# Patient Record
Sex: MALE | Race: WHITE | NOT HISPANIC OR LATINO | Employment: OTHER | URBAN - METROPOLITAN AREA
[De-identification: names, ages, dates, MRNs, and addresses within clinical notes are randomized per-mention and may not be internally consistent; named-entity substitution may affect disease eponyms.]

---

## 2017-05-01 ENCOUNTER — HISTORICAL (OUTPATIENT)
Dept: ADMINISTRATIVE | Facility: HOSPITAL | Age: 57
End: 2017-05-01

## 2017-05-11 ENCOUNTER — TELEPHONE (OUTPATIENT)
Dept: FAMILY MEDICINE | Facility: CLINIC | Age: 57
End: 2017-05-11

## 2017-05-11 RX ORDER — PREDNISONE 20 MG/1
20 TABLET ORAL DAILY
Qty: 5 TABLET | Refills: 0 | Status: SHIPPED | OUTPATIENT
Start: 2017-05-11 | End: 2017-05-16

## 2017-05-11 RX ORDER — AMOXICILLIN AND CLAVULANATE POTASSIUM 875; 125 MG/1; MG/1
1 TABLET, FILM COATED ORAL EVERY 12 HOURS
Qty: 14 TABLET | Refills: 0 | Status: SHIPPED | OUTPATIENT
Start: 2017-05-11 | End: 2017-08-30

## 2017-05-11 NOTE — TELEPHONE ENCOUNTER
----- Message from Bassam Lopez MD sent at 5/11/2017  2:58 PM CDT -----  Pt Rx sent for Augmentin and prednisone  ----- Message -----     From: Natalie Hay MA     Sent: 5/11/2017   8:43 AM       To: Bassam Lopez MD    Pt has sinus infection   Going out of town tonight  Wants antibotic and steriods called in

## 2017-05-11 NOTE — TELEPHONE ENCOUNTER
----- Message from Natalie Hay MA sent at 5/11/2017  8:43 AM CDT -----  Pt has sinus infection   Going out of town tonight  Wants antibotic and steriods called in

## 2017-08-30 ENCOUNTER — TELEPHONE (OUTPATIENT)
Dept: FAMILY MEDICINE | Facility: CLINIC | Age: 57
End: 2017-08-30

## 2017-08-30 DIAGNOSIS — J20.9 ACUTE BRONCHITIS, UNSPECIFIED ORGANISM: Primary | ICD-10-CM

## 2017-08-30 RX ORDER — TRIAMTERENE/HYDROCHLOROTHIAZID 37.5-25 MG
1 TABLET ORAL DAILY
COMMUNITY
Start: 2017-01-09 | End: 2017-08-30 | Stop reason: SDUPTHER

## 2017-08-30 RX ORDER — METOPROLOL TARTRATE 100 MG/1
1 TABLET ORAL 2 TIMES DAILY
COMMUNITY
Start: 2017-02-10 | End: 2018-02-21 | Stop reason: SDUPTHER

## 2017-08-30 RX ORDER — MULTIVITAMIN
1 TABLET ORAL DAILY
COMMUNITY
End: 2018-10-30

## 2017-08-30 RX ORDER — CEFUROXIME AXETIL 500 MG/1
500 TABLET ORAL EVERY 12 HOURS
Qty: 14 TABLET | Refills: 0 | Status: SHIPPED | OUTPATIENT
Start: 2017-08-30 | End: 2017-09-06

## 2017-08-30 RX ORDER — LANOLIN ALCOHOL/MO/W.PET/CERES
1 CREAM (GRAM) TOPICAL DAILY
COMMUNITY
Start: 2016-10-27 | End: 2019-12-21 | Stop reason: CLARIF

## 2017-08-30 RX ORDER — PREDNISONE 20 MG/1
20 TABLET ORAL DAILY
Qty: 5 TABLET | Refills: 0 | Status: SHIPPED | OUTPATIENT
Start: 2017-08-30 | End: 2017-09-04

## 2017-08-30 RX ORDER — AMLODIPINE BESYLATE AND ATORVASTATIN CALCIUM 5; 20 MG/1; MG/1
1 TABLET, FILM COATED ORAL DAILY
COMMUNITY
Start: 2017-02-10 | End: 2018-02-21 | Stop reason: SDUPTHER

## 2017-08-30 RX ORDER — OMEPRAZOLE 20 MG/1
1 TABLET, DELAYED RELEASE ORAL DAILY
COMMUNITY

## 2017-08-30 RX ORDER — POTASSIUM CHLORIDE 750 MG/1
1 TABLET, EXTENDED RELEASE ORAL 2 TIMES DAILY
COMMUNITY
Start: 2016-10-27 | End: 2017-10-02 | Stop reason: SDUPTHER
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
## 2017-09-29 ENCOUNTER — PATIENT MESSAGE (OUTPATIENT)
Dept: FAMILY MEDICINE | Facility: CLINIC | Age: 57
End: 2017-09-29

## 2017-10-02 RX ORDER — POTASSIUM CHLORIDE 750 MG/1
10 TABLET, EXTENDED RELEASE ORAL 2 TIMES DAILY
Qty: 180 TABLET | Refills: 2 | Status: SHIPPED | OUTPATIENT
Start: 2017-10-02 | End: 2019-07-15 | Stop reason: SDUPTHER

## 2017-11-03 LAB
ALBUMIN SERPL-MCNC: 4.3 G/DL (ref 3.1–4.7)
ALP SERPL-CCNC: 55 IU/L (ref 40–104)
ALT (SGPT): 32 IU/L (ref 3–33)
AST SERPL-CCNC: 24 IU/L (ref 10–40)
BILIRUB SERPL-MCNC: 0.8 MG/DL (ref 0.3–1)
BUN SERPL-MCNC: 15 MG/DL (ref 8–20)
CALCIUM SERPL-MCNC: 9.2 MG/DL (ref 7.7–10.4)
CHLORIDE: 105 MMOL/L (ref 98–110)
CO2 SERPL-SCNC: 31.5 MMOL/L (ref 22.8–31.6)
COMPLEXED PSA SERPL-MCNC: 1.4 NG/ML (ref 0–3)
CREATININE RANDOM URINE: 268 MG/DL
CREATININE: 0.93 MG/DL (ref 0.6–1.4)
GLUCOSE: 119 MG/DL (ref 70–99)
MICROALBUM.,U,RANDOM: 10.6 MCG/ML (ref 0–19.9)
MICROALBUMIN/CREATININE RATIO: 4 (ref 0–30)
POTASSIUM SERPL-SCNC: 3.7 MMOL/L (ref 3.5–5)
PROT SERPL-MCNC: 6.5 G/DL (ref 6–8.2)
SODIUM: 143 MMOL/L (ref 134–144)

## 2017-11-08 ENCOUNTER — OFFICE VISIT (OUTPATIENT)
Dept: FAMILY MEDICINE | Facility: CLINIC | Age: 57
End: 2017-11-08
Payer: COMMERCIAL

## 2017-11-08 VITALS
DIASTOLIC BLOOD PRESSURE: 84 MMHG | HEIGHT: 72 IN | BODY MASS INDEX: 32.64 KG/M2 | WEIGHT: 241 LBS | HEART RATE: 72 BPM | SYSTOLIC BLOOD PRESSURE: 127 MMHG

## 2017-11-08 DIAGNOSIS — K21.9 GERD WITHOUT ESOPHAGITIS: ICD-10-CM

## 2017-11-08 DIAGNOSIS — Z11.59 NEED FOR HEPATITIS C SCREENING TEST: ICD-10-CM

## 2017-11-08 DIAGNOSIS — I10 BENIGN ESSENTIAL HYPERTENSION: Primary | ICD-10-CM

## 2017-11-08 DIAGNOSIS — E78.2 MIXED DYSLIPIDEMIA: ICD-10-CM

## 2017-11-08 PROBLEM — E78.49 FAMILIAL COMBINED HYPERLIPIDEMIA: Status: ACTIVE | Noted: 2017-11-08

## 2017-11-08 PROBLEM — E87.6 HYPOKALEMIA: Status: ACTIVE | Noted: 2017-11-08

## 2017-11-08 PROBLEM — E66.9 OBESITY WITH BODY MASS INDEX 30 OR GREATER: Status: ACTIVE | Noted: 2017-11-08

## 2017-11-08 PROBLEM — Z86.79 HISTORY OF HYPERTENSION: Status: ACTIVE | Noted: 2017-11-08

## 2017-11-08 PROBLEM — J30.1 HAY FEVER: Status: ACTIVE | Noted: 2017-11-08

## 2017-11-08 PROBLEM — N20.0 CALCULUS OF KIDNEY: Status: ACTIVE | Noted: 2017-11-08

## 2017-11-08 PROBLEM — F17.200 CURRENT SMOKER: Status: ACTIVE | Noted: 2017-11-08

## 2017-11-08 PROCEDURE — 99213 OFFICE O/P EST LOW 20 MIN: CPT | Mod: ,,, | Performed by: INTERNAL MEDICINE

## 2017-11-08 NOTE — PATIENT INSTRUCTIONS
Taking Your Blood Pressure  Blood pressure is the force of blood against the artery wall as it moves from the heart through the blood vessels. You can take your own blood pressure reading using a digital monitor. Take your readings the same each time, using the same arm. Take readings as often as your healthcare provider instructs.  About blood pressure monitors  Blood pressure monitors are designed for certain ages and cases. You can find monitors for older adults, for pregnant women, and for children. Make sure the one you choose is the right one for your age and situation.  The American Heart Association recommends an automatic cuff monitor that fits on your upper arm (bicep). The cuff should fit your arm size. A cuff thats too large or too small will not give an accurate reading. Measure around your upper arm to find your size.  Monitors that attach to your finger or wrist are not as accurate as monitors for your upper arm.  Ask your healthcare provider for help in choosing a monitor. Bring your monitor to your next provider visit if you need help in using it the correct way.  The steps below are general instructions for using an automatic digital monitor.  Step 1. Relax    · Take your blood pressure at the same time every day, such as in the morning or evening, or at the time your healthcare provider recommends.  · Wait at least a half-hour after smoking, eating, or exercising. Don't drink coffee, tea, soda, or other caffeinated beverages before checking your blood pressure.  · Sit comfortably at a table with both feet on the floor. Do not cross your legs or feet. Place the monitor near you.  · Rest for a few minutes before you begin.  Step 2. Wrap the cuff    · Place your arm on the table, palm up. Your arm should be at the level of your heart. Wrap the cuff around your upper arm, just above your elbow. Its best done on bare skin, not over clothing. Most cuffs will indicate where the brachial artery (the  blood vessel in the middle of the arm at the inner side of the elbow) should line up with the cuff. Look in your monitor's instruction booklet for an illustration. You can also bring your cuff to your healthcare provider and have them show you how to correctly place the cuff.  Step 3. Inflate the cuff    · Push the button that starts the pump.  · The cuff will tighten, then loosen.  · The numbers will change. When they stop changing, your blood pressure reading will appear.  · Take 2 or 3 readings one minute apart.  Step 4. Write down the results of each reading    · Write down your blood pressure numbers for each reading. Note the date and time. Keep your results in one place, such as a notebook. Even if your monitor has a built-in memory, keep a hard copy of the readings.  · Remove the cuff from your arm. Turn off the machine.  · Bring your blood pressure records with your healthcare providers at each visit.  · If you start a new blood pressure medicine, note the day you started the new medicine. Also note the day if you change the dose of your medicine. This information goes on your blood pressure recording sheet. This will help your healthcare provider monitor how well the medicine changes are working.  · Ask your healthcare provider what numbers should prompt you to call him or her. Also ask what numbers should prompt you to get help right away.  Date Last Reviewed: 11/1/2016  © 4334-2360 [employer]. [address] [place] [address], [place] [address]. All rights reserved. This information is not intended as a substitute for professional medical care. Always follow your healthcare professional's instructions.

## 2017-11-08 NOTE — PROGRESS NOTES
Subjective:       Patient ID: Howard Segura is a 57 y.o. male.    Chief Complaint: Hypertension (lab review); Hyperlipidemia; and Gastroesophageal Reflux    Hypertension   This is a chronic problem. The current episode started more than 1 year ago. The problem is controlled. Associated symptoms include peripheral edema. Pertinent negatives include no anxiety, chest pain, malaise/fatigue, palpitations or shortness of breath. Risk factors for coronary artery disease include sedentary lifestyle, male gender, obesity and dyslipidemia. Past treatments include calcium channel blockers, beta blockers and diuretics (Also potassium supplements for low potassium). Compliance problems include exercise.  There is no history of CVA, heart failure or left ventricular hypertrophy.   Hyperlipidemia   This is a chronic (Recent triglycerides are elevated. LDL is good.) problem. The current episode started more than 1 year ago. The problem is controlled. Recent lipid tests were reviewed and are variable. Exacerbating diseases include obesity. He has no history of hypothyroidism. Factors aggravating his hyperlipidemia include thiazides. Pertinent negatives include no chest pain, myalgias or shortness of breath. Current antihyperlipidemic treatment includes statins. Compliance problems include adherence to exercise.  Risk factors for coronary artery disease include hypertension, male sex, dyslipidemia and obesity.   Gastroesophageal Reflux   He complains of heartburn. He reports no abdominal pain, no chest pain or no coughing. This is a chronic problem. The current episode started more than 1 year ago. The problem occurs frequently. The problem has been waxing and waning. Pertinent negatives include no fatigue.       Past Medical History:   Diagnosis Date    GERD (gastroesophageal reflux disease)     Hyperlipidemia     Hypertension     Kidney stone      Social History     Social History    Marital status:      Spouse name:  N/A    Number of children: N/A    Years of education: N/A     Occupational History    Not on file.     Social History Main Topics    Smoking status: Never Smoker    Smokeless tobacco: Never Used    Alcohol use No    Drug use: No    Sexual activity: Yes     Partners: Female     Other Topics Concern    Not on file     Social History Narrative    No narrative on file     Past Surgical History:   Procedure Laterality Date    KNEE SURGERY      ROTATOR CUFF REPAIR      SINUS SURGERY       Family History   Problem Relation Age of Onset    Hepatitis Mother     Heart disease Father        Review of Systems   Constitutional: Negative for activity change, appetite change, fatigue, malaise/fatigue and unexpected weight change.   HENT: Negative for congestion, sneezing and trouble swallowing.    Eyes: Negative for pain and visual disturbance.   Respiratory: Negative for cough, chest tightness and shortness of breath.    Cardiovascular: Negative for chest pain, palpitations and leg swelling.        Hypertension and dyslipidemia   Gastrointestinal: Positive for heartburn. Negative for abdominal distention, abdominal pain, blood in stool, constipation and diarrhea.   Endocrine: Negative for cold intolerance, heat intolerance, polydipsia, polyphagia and polyuria.        Dyslipidemia with triglyceride elevation.   Genitourinary: Negative for dysuria and hematuria.   Musculoskeletal: Negative for arthralgias, back pain, gait problem and myalgias.   Skin: Negative for pallor, rash and wound.   Allergic/Immunologic: Negative for environmental allergies, food allergies and immunocompromised state.   Neurological: Negative for dizziness, seizures, speech difficulty, light-headedness and numbness.   Hematological: Negative for adenopathy. Does not bruise/bleed easily.   Psychiatric/Behavioral: Negative for agitation, behavioral problems and confusion. The patient is not nervous/anxious.        Objective:       Vitals:     11/08/17 1121   BP: 127/84   Pulse: 72   Weight: 109.3 kg (241 lb)   Height: 6' (1.829 m)     Physical Exam   Constitutional: He is oriented to person, place, and time. He appears well-developed.   HENT:   Head: Normocephalic and atraumatic.   Nose: Nose normal.   Mouth/Throat: Oropharynx is clear and moist. No oropharyngeal exudate.   Eyes: Conjunctivae and EOM are normal.   Neck: Normal range of motion. Neck supple. No JVD present. No tracheal deviation present. No thyromegaly present.   Cardiovascular: Normal rate, regular rhythm and normal heart sounds.  Exam reveals no gallop and no friction rub.    No murmur heard.  Pulmonary/Chest: Effort normal and breath sounds normal. No respiratory distress. He has no wheezes. He has no rales.   Abdominal: Soft. Bowel sounds are normal. He exhibits no distension. There is no tenderness.   Musculoskeletal: Normal range of motion. He exhibits edema (trace).   Neurological: He is alert and oriented to person, place, and time. He has normal reflexes.   Skin: Skin is warm and dry.   Psychiatric: He has a normal mood and affect.   Nursing note and vitals reviewed.    panel   Order: [id number] - Reflex for Order [id number]   Status:  Final result   Visible to patient:  Yes (Patient Portal) Next appt:  None   Narrative                                  VALUE             REFERENCE RANGE  UNITS                                         ----------------------------------------------------------------------------------  Cholesterol                   140                         mg/dL       Triglycerides                 287 H                       mg/dL       HDL Cholesterol                28                        23-75  mg/dL       LDL Cholesterol                55                        0-100  mg/dL       VLDL Cholesterol               57 H                      12-27  mg/dL       Cholesterol Ratio            5.00                                            CHOLESTEROL RATIO  INTERPRETATION                           MEN-- WOMEN                                                                                                                        AVERAGE RISK--5.00--4.40      2X AVERAGE RISK-  9.51--7.00      3X AVERAGE RISK-  23.00-    11.00         Specimen Collected: 11/03/17 06:37 Last Resulted: 11/03/17 08:53 Order Details View Encounter Lab and Collection Details Routing               Comprehensive metabolic panel   Order: [id number]   Status:  Final result   Visible to patient:  Yes (Patient Portal) Next appt:  None    Ref Range & Units 5d ago   Glucose 70 - 99 mg/dL 119     BUN, Bld 8 - 20 mg/dL 15    Creatinine 0.60 - 1.40 mg/dL 0.93    Calcium 7.7 - 10.4 mg/dL 9.2    Sodium 134 - 144 mmol/L 143    Potassium 3.5 - 5.0 mmol/L 3.7    Chloride 98 - 110 mmol/L 105    CO2 22.8 - 31.6 mmol/L 31.5    Albumin 3.1 - 4.7 g/dL 4.3    Total Bilirubin 0.3 - 1.0 mg/dL 0.8    Alkaline Phosphatase 40 - 104 IU/L 55    Total Protein 6.0 - 8.2 g/dL 6.5    ALT (SGPT) 3 - 33 IU/L 32    AST 10 - 40 IU/L 24    [facility]  [facility]             Assessment:       1. Benign essential hypertension    2. Mixed dyslipidemia    3. Need for hepatitis C screening test    4. GERD without esophagitis         Plan:           Benign essential hypertension  -     Basic metabolic panel; Future; Expected date: 11/08/2017    Mixed dyslipidemia    Need for hepatitis C screening test  -     Hepatitis C antibody; Future; Expected date: 11/08/2017    GERD without esophagitis    Patient has been advised to watch diet and exercise. Avoid fried and fatty food. Compliance to medications and follow up urged.      Discussion about reflux and prevention including dietary measures have been discussed.    Patient is doing okay on medications.    Follow-up in 6 months for annual physical.    Current Outpatient Prescriptions on File Prior to Visit   Medication Sig Dispense Refill    amlodipine-atorvastatin (CADUET) 5-20 mg per  tablet Take 1 tablet by mouth once daily.      magnesium oxide (MAG-OX) 400 mg tablet Take 1 tablet by mouth once daily.      metoprolol tartrate (LOPRESSOR) 100 MG tablet Take 1 tablet by mouth 2 (two) times daily.      multivitamin (THERAGRAN) per tablet Take 1 tablet by mouth once daily.      omeprazole (PRILOSEC OTC) 20 MG tablet Take 1 tablet by mouth once daily.      potassium chloride SA (KLOR-CON M10) 10 MEQ tablet Take 1 tablet (10 mEq total) by mouth 2 (two) times daily. 180 tablet 2    triamterene-hydrochlorothiazide 37.5-25 mg (MAXZIDE-25) 37.5-25 mg per tablet        No current facility-administered medications on file prior to visit.

## 2018-01-19 ENCOUNTER — PATIENT MESSAGE (OUTPATIENT)
Dept: FAMILY MEDICINE | Facility: CLINIC | Age: 58
End: 2018-01-19

## 2018-01-19 DIAGNOSIS — J01.00 ACUTE NON-RECURRENT MAXILLARY SINUSITIS: Primary | ICD-10-CM

## 2018-01-19 RX ORDER — PREDNISONE 20 MG/1
20 TABLET ORAL DAILY
Qty: 5 TABLET | Refills: 0 | Status: SHIPPED | OUTPATIENT
Start: 2018-01-19 | End: 2018-01-24

## 2018-02-19 RX ORDER — TRIAMTERENE/HYDROCHLOROTHIAZID 37.5-25 MG
1 TABLET ORAL DAILY
Qty: 90 TABLET | Refills: 1 | Status: SHIPPED | OUTPATIENT
Start: 2018-02-19 | End: 2018-08-08 | Stop reason: SDUPTHER

## 2018-02-21 RX ORDER — AMLODIPINE BESYLATE AND ATORVASTATIN CALCIUM 5; 20 MG/1; MG/1
1 TABLET, FILM COATED ORAL DAILY
Qty: 90 TABLET | Refills: 3 | Status: SHIPPED | OUTPATIENT
Start: 2018-02-21 | End: 2019-03-07 | Stop reason: SDUPTHER

## 2018-02-21 RX ORDER — METOPROLOL TARTRATE 100 MG/1
100 TABLET ORAL 2 TIMES DAILY
Qty: 180 TABLET | Refills: 3 | Status: SHIPPED | OUTPATIENT
Start: 2018-02-21 | End: 2019-03-07 | Stop reason: SDUPTHER

## 2018-05-02 LAB
BUN SERPL-MCNC: 14 MG/DL (ref 8–20)
CALCIUM SERPL-MCNC: 9.1 MG/DL (ref 7.7–10.4)
CHLORIDE: 102 MMOL/L (ref 98–110)
CO2 SERPL-SCNC: 30.6 MMOL/L (ref 22.8–31.6)
CREATININE: 0.94 MG/DL (ref 0.6–1.4)
GLUCOSE: 123 MG/DL (ref 70–99)
POTASSIUM SERPL-SCNC: 3.3 MMOL/L (ref 3.5–5)
SODIUM: 142 MMOL/L (ref 134–144)

## 2018-05-03 LAB — HCV AB SERPL QL IA: <0.1 S/CO RATIO (ref 0–0.9)

## 2018-05-08 ENCOUNTER — OFFICE VISIT (OUTPATIENT)
Dept: FAMILY MEDICINE | Facility: CLINIC | Age: 58
End: 2018-05-08
Payer: COMMERCIAL

## 2018-05-08 VITALS
HEART RATE: 86 BPM | WEIGHT: 237 LBS | SYSTOLIC BLOOD PRESSURE: 136 MMHG | BODY MASS INDEX: 32.1 KG/M2 | HEIGHT: 72 IN | DIASTOLIC BLOOD PRESSURE: 86 MMHG

## 2018-05-08 DIAGNOSIS — M54.2 NECK PAIN, BILATERAL: Primary | ICD-10-CM

## 2018-05-08 DIAGNOSIS — N52.9 ERECTILE DYSFUNCTION, UNSPECIFIED ERECTILE DYSFUNCTION TYPE: ICD-10-CM

## 2018-05-08 DIAGNOSIS — E78.2 MIXED DYSLIPIDEMIA: ICD-10-CM

## 2018-05-08 DIAGNOSIS — F17.200 TOBACCO DEPENDENCE: ICD-10-CM

## 2018-05-08 DIAGNOSIS — D17.0 LIPOMA OF NECK: ICD-10-CM

## 2018-05-08 DIAGNOSIS — I10 BENIGN ESSENTIAL HYPERTENSION: ICD-10-CM

## 2018-05-08 PROCEDURE — 99214 OFFICE O/P EST MOD 30 MIN: CPT | Mod: ,,, | Performed by: INTERNAL MEDICINE

## 2018-05-08 PROCEDURE — 3008F BODY MASS INDEX DOCD: CPT | Mod: ,,, | Performed by: INTERNAL MEDICINE

## 2018-05-08 PROCEDURE — 3075F SYST BP GE 130 - 139MM HG: CPT | Mod: ,,, | Performed by: INTERNAL MEDICINE

## 2018-05-08 PROCEDURE — 3079F DIAST BP 80-89 MM HG: CPT | Mod: ,,, | Performed by: INTERNAL MEDICINE

## 2018-05-08 RX ORDER — IBUPROFEN 200 MG
1 TABLET ORAL DAILY
Qty: 30 PATCH | Refills: 2 | Status: SHIPPED | OUTPATIENT
Start: 2018-05-08 | End: 2018-10-30

## 2018-05-08 NOTE — PATIENT INSTRUCTIONS
General Neck and Back Pain    Both neck and back pain are usually caused by injury to the muscles or ligaments of the spine. Sometimes the disks that separate each bone of the spine may cause pain by pressing on a nearby nerve. Back and neck pain may appear after a sudden twisting or bending force (such as in a car accident), or sometimes after a simple awkward movement. In either case, muscle spasm is often present and adds to the pain.  Acute neck and back pain usually gets better in 1 to 2 weeks. Pain related to disk disease, arthritis in the spinal joints or spinal stenosis (narrowing of the spinal canal) can become chronic and last for months or years.  Back and neck pain are common problems. Most people feel better in 1 or 2 weeks, and most of the rest in 1 to 2 months. Most people can remain active.  People experience and describe pain differently.  · Pain can be sharp, stabbing, shooting, aching, cramping, or burning  · Movement, standing, bending, lifting, sitting, or walking may worsen the pain  · Pain can be localized to one spot or area, or it can be more generalized  · Pain can spread or radiate upwards, downwards, to the front, or go down your arms  · Muscle spasm may occur.  Most of the time mechanical problems with the muscles or spine cause the pain. it is usually caused by an injury, whether known or not, to the muscles or ligaments. While illnesses can cause back pain, it is usually not caused by a serious illness. Pain is usually related to physical activity, whether sports, exercise, work, or normal activity. Sometimes it can occur without an identifiable cause. This can happen simply by stretching or moving wrong, without noting pain at the time. Other causes include:  · Overexertion, lifting, pushing, pulling incorrectly or too aggressively.  · Sudden twisting, bending or stretching from an accident (car or fall), or accidental movement.  · Poor posture  · Poor conditioning, lack of regular  exercise  · Spinal disc disease or arthritis  · Stress  · Pregnancy, or illness like appendicitis, bladder or kidney infection, pelvic infections   Home care  · For neck pain: Use a comfortable pillow that supports the head and keeps the spine in a neutral position. The position of the head should not be tilted forward or backward.  · When in bed, try to find a position of comfort. A firm mattress is best. Try lying flat on your back with pillows under your knees. You can also try lying on your side with your knees bent up towards your chest and a pillow between your knees.  · At first, do not try to stretch out the sore spots. If there is a strain, it is not like the good soreness you get after exercising without an injury. In this case, stretching may make it worse.  · Avoid prolonged sitting, long car rides or travel. This puts more stress on the lower back than standing or walking.  · During the first 24 to 72 hours after an injury, apply an ice pack to the painful area for 20 minutes and then remove it for 20 minutes over a period of 60 to 90 minutes or several times a day.   · You can alternate ice and heat therapies. Talk with your healthcare provider about the best treatment for your back or neck pain. As a safety precaution, do not use a heating pad at bedtime. Sleeping with a heating pad can lead to skin burns or tissue damage.  · Therapeutic massage can help relax the back and neck muscles without stretching them.  · Be aware of safe lifting methods and do not lift anything over 15 pounds until all the pain is gone.  Medications  Talk to your healthcare provider before using medicine, especially if you have other medical problems or are taking other medicines.  · You may use over-the-counter medicine to control pain, unless another pain medicine was prescribed. If you have chronic conditions like diabetes, liver or kidney disease, stomach ulcers,  gastrointestinal bleeding, or are taking blood thinner  medicines.  · Be careful if you are given pain medicines, narcotics, or medicine for muscle spasm. They can cause drowsiness, and can affect your coordination, reflexes, and judgment. Do not drive or operate heavy machinery.  Follow-up care  Follow up with your healthcare provider, or as advised. Physical therapy or further tests may be needed.  If X-rays were taken, you will be notified of any new findings that may affect your care.  Call 911  Seek emergency medical care if any of the following occur:  · Trouble breathing  · Confusion  · Very drowsy or trouble awakening  · Fainting or loss of consciousness  · Rapid or very slow heart rate  · Loss of bowel or bladder control  When to seek medical advice  Call your healthcare provider right away if any of these occur:  · Pain becomes worse or spreads into your arms or legs  · Weakness, numbness or pain in one or both arms or legs  · Numbness in the groin area  · Difficulty walking  · Fever of 100.4ºF (38ºC) or higher, or as directed by your healthcare provider  Date Last Reviewed: 7/1/2016 © 2000-2017 [employer]. [address] [place], [place] [id number]. All rights reserved. This information is not intended as a substitute for professional medical care. Always follow your healthcare professional's instructions.        Understanding Erectile Dysfunction    Erectile dysfunction (ED) is a problem getting an erection firm enough or keeping it long enough for intercourse. The problem can happen to any man at any age. But health problems that can lead to ED become more common as a man ages. Up to half of men over age 40 experience ED at some point.  Causes of ED  ED can have many causes. Most are physical. Some are emotional issues. Often, a combination of causes is involved. Causes of ED may include:  · Medical conditions such as diabetes or depression  · Smoking tobacco or marijuana  · Drinking too much alcohol  · Side effects of medications  · Injury to  nerves or blood vessels  · Emotional issues such as stress or relationship problems  ED can be treated  Prescription medications for ED are available. They help many men who try them. Depending upon the cause of the ED, though, medications may not be enough. In these cases, other treatment options are available. These include erectile aids and surgery. Your health care provider can tell you more about the treatment that is right for you. And new treatments for ED are being studied. No matter what the treatment you decide on, stay in touch with your doctor. If your symptoms persist, he or she may be able to adjust your current treatment or try something new.  Date Last Reviewed: 1/1/2017 © 2000-2017 [employer]. [address] [place] [id number]. All rights reserved. This information is not intended as a substitute for professional medical care. Always follow your healthcare professional's instructions.        Lipoma, No Treatment  A lipoma is a local overgrowth of fatty tissue. It appears as a soft raised area, usually less than 2 inches across. It is a benign condition (not cancer).   Home care  General information regarding lipoma includes:  1. No special care is needed for a lipoma.  2. You can consider removal for cosmetic reasons.  3. Sometimes lipomas are uncomfortable because they put pressure on surrounding tissues. This is also a reason to have a lipoma removed.  Follow-up care  Follow up with your healthcare provider, or as advised if you want to have the lipoma removed at a later time.  When to seek medical advice  Call your healthcare provider right away if any of the following occur:  · Redness, pain, tenderness, or drainage from the lipoma  · Lipoma begins to enlarge or change shape  · Changes in the color of the skin over the lipoma  Date Last Reviewed: 6/1/2016 © 2000-2017 [employer]. [address] [place] [id number]. All rights reserved. This  information is not intended as a substitute for professional medical care. Always follow your healthcare professional's instructions.

## 2018-05-08 NOTE — PROGRESS NOTES
Subjective:       Patient ID: Howard Segura is a 57 y.o. male.    Chief Complaint: Neck Pain (1 month ); Mass (on back ); Erectile Dysfunction (months ); Nicotine Dependence; Hypertension; and Hyperlipidemia    Mr. 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 He states that his job is not that stressful.    He has underlying hypertension, dyslipidemia, nicotine dependence. He wants to quit smoking.    He is also been noticing symptoms of erectile dysfunction and lack of desire.    She also feels a mass and lump at the back of his neck. He states that he has history of lipomas.      Neck Pain    This is a new problem. The current episode started 1 to 4 weeks ago. The problem occurs intermittently. The problem has been waxing and waning. The pain is associated with an unknown factor. The quality of the pain is described as stabbing. The pain is at a severity of 3/10. The pain is mild. The symptoms are aggravated by twisting. Associated symptoms include tingling. Pertinent negatives include no chest pain, fever, headaches, leg pain, numbness, paresis, photophobia, syncope, trouble swallowing, visual change, weakness or weight loss. He has tried nothing for the symptoms.   Mass   This is a new problem. The current episode started 1 to 4 weeks ago. The problem has been unchanged. Associated symptoms include neck pain. Pertinent negatives include no abdominal pain, arthralgias, chest pain, congestion, coughing, fatigue, fever, headaches, myalgias, numbness, rash, swollen glands, urinary symptoms, visual change or weakness. Nothing aggravates the symptoms. He has tried nothing for the symptoms.   Erectile Dysfunction   This is a new problem. The current episode started more than 1 month ago. The problem is unchanged. The nature of his difficulty is achieving erection, maintaining erection and penetration. Non-physiologic factors contributing to erectile dysfunction are  a decreased libido. He reports no anxiety or performance anxiety. Irritative symptoms do not include urgency. Obstructive symptoms do not include a weak stream. Pertinent negatives include no dysuria, genital pain or hematuria. Past treatments include nothing. Risk factors include tobacco use and hypertension (Simple obesity).   Nicotine Dependence   Presents for initial visit. Symptoms are negative for fatigue. Preferred tobacco types include cigarettes. Preferred cigarette types include filtered. Preferred strength is regular. His urge triggers include company of smokers and stress. Past treatments include nothing. Howard is thinking about quitting. There is no history of alcohol abuse and drug use.   Hypertension   This is a chronic problem. The current episode started more than 1 year ago. The problem is controlled. Associated symptoms include anxiety and neck pain. Pertinent negatives include no chest pain, headaches, palpitations or shortness of breath. Risk factors for coronary artery disease include sedentary lifestyle, male gender, obesity and dyslipidemia. Past treatments include beta blockers and calcium channel blockers. The current treatment provides moderate improvement. Compliance problems include psychosocial issues.  There is no history of pheochromocytoma or renovascular disease.   Hyperlipidemia   This is a chronic problem. The current episode started more than 1 year ago. The problem is controlled. Exacerbating diseases include obesity. Pertinent negatives include no chest pain, leg pain, myalgias or shortness of breath. Current antihyperlipidemic treatment includes statins. Risk factors for coronary artery disease include a sedentary lifestyle, male sex, hypertension and dyslipidemia.       Past Medical History:   Diagnosis Date    GERD (gastroesophageal reflux disease)     Hyperlipidemia     Hypertension     Kidney stone      Social History     Social History    Marital status:       Spouse name: N/A    Number of children: 0    Years of education: N/A     Occupational History    [employer]      Social History Main Topics    Smoking status: Current Every Day Smoker     Packs/day: 0.50     Types: Cigarettes     Start date: 1/1/1980    Smokeless tobacco: Never Used      Comment: Counselled    Alcohol use No    Drug use: No    Sexual activity: Yes     Partners: Female     Other Topics Concern    Not on file     Social History Narrative    No narrative on file     Past Surgical History:   Procedure Laterality Date    KNEE SURGERY      ROTATOR CUFF REPAIR      SINUS SURGERY       Family History   Problem Relation Age of Onset    Hepatitis Mother     Heart disease Father        Review of Systems   Constitutional: Negative for activity change, appetite change, fatigue, fever, unexpected weight change (lost 4-6 lbs) and weight loss.   HENT: Negative for congestion, sneezing and trouble swallowing.    Eyes: Negative for photophobia, pain and visual disturbance.   Respiratory: Negative for cough, chest tightness and shortness of breath.    Cardiovascular: Negative for chest pain, palpitations, leg swelling and syncope.        Hypertension and dyslipidemia   Gastrointestinal: Negative for abdominal distention, abdominal pain, blood in stool, constipation and diarrhea.   Endocrine: Negative for cold intolerance, heat intolerance, polydipsia, polyphagia and polyuria.        Dyslipidemia with triglyceride elevation.   Genitourinary: Positive for decreased libido. Negative for dysuria, hematuria and urgency.        Erectile dysfunction   Musculoskeletal: Positive for back pain and neck pain. Negative for arthralgias, gait problem and myalgias.        Neck pain radiates to both arms   Skin: Negative for pallor, rash and wound.   Allergic/Immunologic: Negative for environmental allergies, food allergies and immunocompromised state.   Neurological: Positive for tingling.  Negative for dizziness, seizures, speech difficulty, weakness, light-headedness, numbness and headaches.   Hematological: Negative for adenopathy. Does not bruise/bleed easily.   Psychiatric/Behavioral: Negative for agitation, behavioral problems and confusion. The patient is not nervous/anxious.        Objective:       Vitals:    05/08/18 1114 05/08/18 1216   BP: (!) 141/98 136/86   Pulse: 86    Weight: 107.5 kg (237 lb)    Height: 6' (1.829 m)      Physical Exam   Constitutional: He is oriented to person, place, and time. He appears well-developed.   HENT:   Head: Normocephalic and atraumatic.   Nose: Nose normal.   Mouth/Throat: Oropharynx is clear and moist. No oropharyngeal exudate.   Eyes: Conjunctivae and EOM are normal.   Neck: Normal range of motion. Neck supple. No JVD present. No tracheal deviation present. No thyromegaly present.   Cardiovascular: Normal rate, regular rhythm and normal heart sounds.  Exam reveals no gallop and no friction rub.    No murmur heard.  Pulmonary/Chest: Effort normal and breath sounds normal. No respiratory distress. He has no wheezes. He has no rales.   Abdominal: Soft. Bowel sounds are normal. He exhibits no distension. There is no tenderness.   Musculoskeletal: Normal range of motion. He exhibits edema (trace).        Back:    Area of concern seems to be a small swelling which is not tender and slips under the hand. No venous prominence is. Very irregular margin.   Neurological: He is alert and oriented to person, place, and time. He displays no atrophy and no tremor. No sensory deficit. He exhibits normal muscle tone.   Reflex Scores:       Tricep reflexes are 2+ on the right side and 2+ on the left side.       Bicep reflexes are 2+ on the right side and 2+ on the left side.       Brachioradialis reflexes are 2+ on the right side and 2+ on the left side.       Patellar reflexes are 3+ on the right side and 3+ on the left side.       Achilles reflexes are 2+ on the right  side and 2+ on the left side.  Skin: Skin is warm and dry.   Psychiatric: He has a normal mood and affect.   Nursing note and vitals reviewed.      Assessment:       1. Neck pain, bilateral    2. Erectile dysfunction, unspecified erectile dysfunction type    3. Lipoma of neck    4. Tobacco dependence    5. Benign essential hypertension    6. Mixed dyslipidemia         Plan:           Neck pain, bilateral  -     Ambulatory Referral to Physical/Occupational Therapy    Erectile dysfunction, unspecified erectile dysfunction type    Lipoma of neck    Tobacco dependence  -     nicotine (NICODERM CQ) 21 mg/24 hr; Place 1 patch onto the skin once daily.  Dispense: 30 patch; Refill: 2    Benign essential hypertension    Mixed dyslipidemia    Patient's neck pain may be degenerative arthritis and I'll make a referral for physical therapy. His reflexes appear equal. No motor or significant sensory deficits.    Pressure issues as stretch exercises also have been explained.    I've complemented his efforts to quit smoking and offered him using Nicoderm or Chantix. He would like to try nicoderm. I've advised him to check with his insurance about coverage issues. At any rate this might be a good investment for a good health in future.    His blood pressures are slightly elevated in office but seem to be better at home. He cites Dr. Anxiety.    Labs are good.    I've reassured him about the swelling in the back which might be a lipoma. We'll keep this under observation.      I've advised him that quitting smoking, some weight management and probably beta blockers might contribute to his erectile dysfunction and lack of libido.    At this point I'll defer any definitive treatment for this issue.till he has successfully quitting smoking and has lost some weight. Of course he has to go through some physical therapy for his neck pain at this point.    I'll see him back in 3 months to continue with the issues of tobacco dependency, neck  pain and erectile dysfunction. He'll continue to monitor his blood pressures at home.      Follow-up in 3 months.

## 2018-08-08 RX ORDER — TRIAMTERENE/HYDROCHLOROTHIAZID 37.5-25 MG
1 TABLET ORAL DAILY
Qty: 90 TABLET | Refills: 1 | Status: SHIPPED | OUTPATIENT
Start: 2018-08-08 | End: 2019-03-04 | Stop reason: SDUPTHER

## 2018-10-08 ENCOUNTER — PATIENT MESSAGE (OUTPATIENT)
Dept: FAMILY MEDICINE | Facility: CLINIC | Age: 58
End: 2018-10-08

## 2018-10-08 DIAGNOSIS — I10 BENIGN ESSENTIAL HYPERTENSION: Primary | ICD-10-CM

## 2018-10-08 DIAGNOSIS — E78.49 FAMILIAL COMBINED HYPERLIPIDEMIA: ICD-10-CM

## 2018-10-08 DIAGNOSIS — Z12.5 SCREENING FOR PROSTATE CANCER: ICD-10-CM

## 2018-10-23 ENCOUNTER — PATIENT MESSAGE (OUTPATIENT)
Dept: FAMILY MEDICINE | Facility: CLINIC | Age: 58
End: 2018-10-23

## 2018-10-23 LAB
ALBUMIN SERPL-MCNC: 4.2 G/DL (ref 3.1–4.7)
ALP SERPL-CCNC: 56 IU/L (ref 40–104)
ALT (SGPT): 46 IU/L (ref 3–33)
AST SERPL-CCNC: 29 IU/L (ref 10–40)
BILIRUB SERPL-MCNC: 0.7 MG/DL (ref 0.3–1)
BUN SERPL-MCNC: 18 MG/DL (ref 8–20)
CALCIUM SERPL-MCNC: 8.9 MG/DL (ref 7.7–10.4)
CHLORIDE: 102 MMOL/L (ref 98–110)
CO2 SERPL-SCNC: 31.3 MMOL/L (ref 22.8–31.6)
COMPLEXED PSA SERPL-MCNC: 1.6 NG/ML (ref 0–3)
CREATININE RANDOM URINE: 359 MG/DL
CREATININE: 1.04 MG/DL (ref 0.6–1.4)
GLUCOSE: 136 MG/DL (ref 70–99)
MICROALBUM.,U,RANDOM: 24.4 MCG/ML (ref 0–19.9)
MICROALBUMIN/CREATININE RATIO: 7 (ref 0–30)
POTASSIUM SERPL-SCNC: 3.2 MMOL/L (ref 3.5–5)
PROT SERPL-MCNC: 6.4 G/DL (ref 6–8.2)
SODIUM: 141 MMOL/L (ref 134–144)

## 2018-10-30 ENCOUNTER — OFFICE VISIT (OUTPATIENT)
Dept: FAMILY MEDICINE | Facility: CLINIC | Age: 58
End: 2018-10-30
Payer: COMMERCIAL

## 2018-10-30 VITALS
HEIGHT: 72 IN | BODY MASS INDEX: 33.18 KG/M2 | WEIGHT: 245 LBS | DIASTOLIC BLOOD PRESSURE: 84 MMHG | HEART RATE: 77 BPM | SYSTOLIC BLOOD PRESSURE: 139 MMHG

## 2018-10-30 DIAGNOSIS — K21.9 GERD WITHOUT ESOPHAGITIS: ICD-10-CM

## 2018-10-30 DIAGNOSIS — I10 BENIGN ESSENTIAL HYPERTENSION: Primary | ICD-10-CM

## 2018-10-30 DIAGNOSIS — E78.49 FAMILIAL COMBINED HYPERLIPIDEMIA: ICD-10-CM

## 2018-10-30 PROCEDURE — 3075F SYST BP GE 130 - 139MM HG: CPT | Mod: ,,, | Performed by: INTERNAL MEDICINE

## 2018-10-30 PROCEDURE — 3008F BODY MASS INDEX DOCD: CPT | Mod: ,,, | Performed by: INTERNAL MEDICINE

## 2018-10-30 PROCEDURE — 99214 OFFICE O/P EST MOD 30 MIN: CPT | Mod: ,,, | Performed by: INTERNAL MEDICINE

## 2018-10-30 PROCEDURE — 3079F DIAST BP 80-89 MM HG: CPT | Mod: ,,, | Performed by: INTERNAL MEDICINE

## 2018-10-30 NOTE — PATIENT INSTRUCTIONS
"  Facts About Dietary Fat     Olive oil is a good source of unsaturated fat.     Eating less saturated and trans fat is one of the best things you can do for your heart. Start by finding out which fats are better to use. Then always try to use as little "bad" fat as you can.  Why eat less fat?  · Cutting down on the fat you eat can lower your blood cholesterol levels. This may help prevent clogged arteries from buildup of plaque.  · A low-fat diet can help you lose excess weight. Doing so can lower your blood pressure and reduce your chances of getting diabetes.  · A low-fat diet reduces your risk for stroke and for some cancers.  Unsaturated fat is most healthy  · When you must add fat, use unsaturated fat.  · Unsaturated fats come from plants. They include olive, canola, peanut, corn, avocado, safflower, and sunflower oils.  · Liquid (squeezable) margarine is also mostly unsaturated fat.  · In moderate amounts, unsaturated fat can even be good for your heart.  Saturated fat is less healthy  · Avoid eating saturated fat because it raises your blood cholesterol levels.  · Most saturated fat comes from animals. Foods such as butter, lard, cheese, cream, whole milk, and fatty cuts of meat are high in saturated fat.  · Some oils, such as palm and coconut oils, are also saturated fats.  Trans fat is least healthy  · Also avoid trans fat whenever possible. Even if it's not listed on the food label, look for it in the ingredients in the form of hydrogenated or partially hydrogenated oils.  · This is found in snack foods, shortening, french fries, and stick margarines.  Add flavor without fat  · Sprinkle herbs on fish, chicken, and meat, and in soups.  · Try herbs, lemon juice, or flavored vinegar on vegetables.  · Add chopped onions, garlic, and peppers to flavor beans and rice.   Date Last Reviewed: 5/11/2015  © 6856-7167 [employer]. [address] [place] [address], [place] [address]. All rights reserved. This " information is not intended as a substitute for professional medical care. Always follow your healthcare professional's instructions.

## 2018-10-30 NOTE — PROGRESS NOTES
Subjective:       Patient ID: Howard Segura is a 58 y.o. male.    Chief Complaint: Hyperlipidemia (lab review ); Hypertension; and Gastroesophageal Reflux    Mr. Howard Morrison is a pleasant 58-year-old  gentleman who comes for follow-up. He has underlying hypertension, dyslipidemia and gastroesophageal reflux.    The best thing that has happened this visit is that he has finally successfully quit smoking. He just decided 1 night as to what he is doing with smoking and the next day he just simply and plainly quit smoking.    Thus far he has been successful with no relapse.    However on the downside, he has started enjoying his bourbon with cream at night and has gained some weight and also his triglycerides have shot up to about 600s.    On the positive side, his reflux symptoms have dissipated completely. He does have some sinus issues.    Overall, he feels better and more positive. He looks forward to jail in approximately 2 years once he qualifies for all the benefits.          Hyperlipidemia   This is a recurrent problem. The current episode started more than 1 year ago. The problem is uncontrolled. Recent lipid tests were reviewed and are high. Exacerbating diseases include obesity. Pertinent negatives include no chest pain. Current antihyperlipidemic treatment includes fibric acid derivatives. Risk factors for coronary artery disease include a sedentary lifestyle, hypertension, male sex and dyslipidemia.   Hypertension   This is a chronic problem. The current episode started more than 1 year ago. Pertinent negatives include no chest pain, headaches, neck pain or palpitations. Risk factors for coronary artery disease include male gender, obesity and dyslipidemia. Past treatments include beta blockers, diuretics and calcium channel blockers.   Gastroesophageal Reflux   He complains of heartburn. He reports no abdominal pain, no chest pain, no choking, no coughing or no wheezing. This is a recurrent  problem. The problem has been rapidly improving (after qutting smoking). Pertinent negatives include no fatigue or melena. Risk factors include obesity and caffeine use (Cut down on coffee and quit smoking). He has tried a PPI for the symptoms. The treatment provided significant (after quitting smoking) relief.       Past Medical History:   Diagnosis Date    GERD (gastroesophageal reflux disease)     Hyperlipidemia     Hypertension     Kidney stone      Social History     Socioeconomic History    Marital status:      Spouse name: Not on file    Number of children: 0    Years of education: Not on file    Highest education level: Not on file   Social Needs    Financial resource strain: Not on file    Food insecurity - worry: Not on file    Food insecurity - inability: Not on file    Transportation needs - medical: Not on file    Transportation needs - non-medical: Not on file   Occupational History    Occupation: [employer]   Tobacco Use    Smoking status: Current Every Day Smoker     Packs/day: 0.50     Types: Cigarettes     Start date: 1/1/1980    Smokeless tobacco: Never Used    Tobacco comment: Counselled   Substance and Sexual Activity    Alcohol use: No    Drug use: No    Sexual activity: Yes     Partners: Female   Other Topics Concern    Not on file   Social History Narrative    Not on file     Past Surgical History:   Procedure Laterality Date    KNEE SURGERY      ROTATOR CUFF REPAIR      SINUS SURGERY       Family History   Problem Relation Age of Onset    Hepatitis Mother     Heart disease Father        Review of Systems   Constitutional: Negative for activity change, chills, diaphoresis, fatigue and unexpected weight change (wt gain after quitting smoking).   HENT: Negative for hearing loss, rhinorrhea and trouble swallowing.    Eyes: Negative for discharge and visual disturbance.   Respiratory: Negative for cough, choking, chest tightness and  wheezing.    Cardiovascular: Negative for chest pain and palpitations.        HTN/dyslipidemia   Gastrointestinal: Positive for heartburn. Negative for abdominal distention, abdominal pain, blood in stool, constipation, diarrhea, melena and vomiting.        GERD better   Endocrine: Negative for polydipsia and polyuria.   Genitourinary: Negative for difficulty urinating, hematuria and urgency.   Musculoskeletal: Negative for arthralgias, joint swelling and neck pain.   Neurological: Negative for weakness and headaches.   Psychiatric/Behavioral: Negative for confusion and dysphoric mood.         Objective:      Blood pressure 139/84, pulse 77, height 6' (1.829 m), weight 111.1 kg (245 lb). Body mass index is 33.23 kg/m².  Physical Exam   Constitutional: He is oriented to person, place, and time. He appears well-developed.   HENT:   Head: Normocephalic and atraumatic.   Nose: Nose normal.   Mouth/Throat: Oropharynx is clear and moist. No oropharyngeal exudate.   Eyes: Conjunctivae and EOM are normal.   Neck: Normal range of motion. Neck supple. No JVD present. No tracheal deviation present. No thyromegaly present.   Cardiovascular: Normal rate, regular rhythm and normal heart sounds. Exam reveals no gallop and no friction rub.   No murmur heard.  Pulmonary/Chest: Effort normal and breath sounds normal. No respiratory distress. He has no wheezes. He has no rales.   Abdominal: Soft. Bowel sounds are normal. He exhibits no distension. There is no tenderness.   Musculoskeletal: Normal range of motion. He exhibits edema (trace).   Neurological: He is alert and oriented to person, place, and time.   Skin: Skin is warm and dry.   Psychiatric: He has a normal mood and affect.   Nursing note and vitals reviewed.        Assessment:       1. Benign essential hypertension    2. Familial combined hyperlipidemia    3. GERD without esophagitis           Orders Only on 10/23/2018   Component Date Value Ref Range Status     Microalbum.,U,Random 10/23/2018 24.4* 0.0 - 19.9 mcg/ml Final    Creatinine, Random Ur 10/23/2018 359.00  mg/dl Final    Microalb Creat Ratio 10/23/2018 7  0 - 30 Final   Orders Only on 10/23/2018   Component Date Value Ref Range Status    Glucose 10/23/2018 136* 70 - 99 mg/dL Final    BUN, Bld 10/23/2018 18  8 - 20 mg/dL Final    Creatinine 10/23/2018 1.04  0.60 - 1.40 mg/dL Final    Calcium 10/23/2018 8.9  7.7 - 10.4 mg/dL Final    Sodium 10/23/2018 141  134 - 144 mmol/L Final    Potassium 10/23/2018 3.2* 3.5 - 5.0 mmol/L Final    Chloride 10/23/2018 102  98 - 110 mmol/L Final    CO2 10/23/2018 31.3  22.8 - 31.6 mmol/L Final    Albumin 10/23/2018 4.2  3.1 - 4.7 g/dL Final    Total Bilirubin 10/23/2018 0.7  0.3 - 1.0 mg/dL Final    Alkaline Phosphatase 10/23/2018 56  40 - 104 IU/L Final    Total Protein 10/23/2018 6.4  6.0 - 8.2 g/dL Final    ALT (SGPT) 10/23/2018 46* 3 - 33 IU/L Final    AST 10/23/2018 29  10 - 40 IU/L Final    PSA, SCREEN 10/23/2018 1.6  0.0 - 3.0 ng/mL Final         Plan:           Benign essential hypertension    Familial combined hyperlipidemia    GERD without esophagitis    Patient has been advised to watch diet and exercise. Avoid fried and fatty food. Compliance to medications and follow up urged.    Advised Mr. Segura for Anti reflux measures like small feequent meals, avoid spicy and greasy food. Head end up at night.    He has been complemented on quitting smoking. I've encouraged him continued abstinence.  Patient's reflux symptoms have overall improved after quitting smoking. His breathing also has improved significantly. His triglycerides are elevated and at this point I will recommend him to lose weight by 15-20 pounds over the next 4-6 months. This would definitely bring down his triglycerides naturally.    He can also try over-the-counter omega-3 fatty acids or fish oil capsules 1000 mg twice a day.        Current Outpatient Medications:      amlodipine-atorvastatin (CADUET) 5-20 mg per tablet, Take 1 tablet by mouth once daily., Disp: 90 tablet, Rfl: 3    magnesium oxide (MAG-OX) 400 mg tablet, Take 1 tablet by mouth once daily., Disp: , Rfl:     metoprolol tartrate (LOPRESSOR) 100 MG tablet, Take 1 tablet (100 mg total) by mouth 2 (two) times daily., Disp: 180 tablet, Rfl: 3    omeprazole (PRILOSEC OTC) 20 MG tablet, Take 1 tablet by mouth once daily., Disp: , Rfl:     potassium chloride SA (KLOR-CON M10) 10 MEQ tablet, Take 1 tablet (10 mEq total) by mouth 2 (two) times daily., Disp: 180 tablet, Rfl: 2    triamterene-hydrochlorothiazide 37.5-25 mg (MAXZIDE-25) 37.5-25 mg per tablet, Take 1 tablet by mouth once daily., Disp: 90 tablet, Rfl: 1

## 2018-12-19 ENCOUNTER — PATIENT MESSAGE (OUTPATIENT)
Dept: FAMILY MEDICINE | Facility: CLINIC | Age: 58
End: 2018-12-19

## 2018-12-19 DIAGNOSIS — R21 RASH: Primary | ICD-10-CM

## 2018-12-19 RX ORDER — CLOTRIMAZOLE AND BETAMETHASONE DIPROPIONATE 10; .64 MG/G; MG/G
CREAM TOPICAL 2 TIMES DAILY
Qty: 45 G | Refills: 0 | Status: SHIPPED | OUTPATIENT
Start: 2018-12-19 | End: 2019-06-11 | Stop reason: SDUPTHER

## 2019-02-02 ENCOUNTER — PATIENT MESSAGE (OUTPATIENT)
Dept: FAMILY MEDICINE | Facility: CLINIC | Age: 59
End: 2019-02-02

## 2019-02-04 ENCOUNTER — OFFICE VISIT (OUTPATIENT)
Dept: FAMILY MEDICINE | Facility: CLINIC | Age: 59
End: 2019-02-04
Payer: COMMERCIAL

## 2019-02-04 VITALS
HEIGHT: 72 IN | WEIGHT: 244 LBS | SYSTOLIC BLOOD PRESSURE: 138 MMHG | TEMPERATURE: 98 F | BODY MASS INDEX: 33.05 KG/M2 | DIASTOLIC BLOOD PRESSURE: 88 MMHG | HEART RATE: 89 BPM

## 2019-02-04 DIAGNOSIS — J01.00 ACUTE NON-RECURRENT MAXILLARY SINUSITIS: Primary | ICD-10-CM

## 2019-02-04 PROCEDURE — 3008F PR BODY MASS INDEX (BMI) DOCUMENTED: ICD-10-PCS | Mod: ,,, | Performed by: INTERNAL MEDICINE

## 2019-02-04 PROCEDURE — 99212 PR OFFICE/OUTPT VISIT, EST, LEVL II, 10-19 MIN: ICD-10-PCS | Mod: ,,, | Performed by: INTERNAL MEDICINE

## 2019-02-04 PROCEDURE — 3079F DIAST BP 80-89 MM HG: CPT | Mod: ,,, | Performed by: INTERNAL MEDICINE

## 2019-02-04 PROCEDURE — 99212 OFFICE O/P EST SF 10 MIN: CPT | Mod: ,,, | Performed by: INTERNAL MEDICINE

## 2019-02-04 PROCEDURE — 3008F BODY MASS INDEX DOCD: CPT | Mod: ,,, | Performed by: INTERNAL MEDICINE

## 2019-02-04 PROCEDURE — 3079F PR MOST RECENT DIASTOLIC BLOOD PRESSURE 80-89 MM HG: ICD-10-PCS | Mod: ,,, | Performed by: INTERNAL MEDICINE

## 2019-02-04 PROCEDURE — 3075F PR MOST RECENT SYSTOLIC BLOOD PRESS GE 130-139MM HG: ICD-10-PCS | Mod: ,,, | Performed by: INTERNAL MEDICINE

## 2019-02-04 PROCEDURE — 3075F SYST BP GE 130 - 139MM HG: CPT | Mod: ,,, | Performed by: INTERNAL MEDICINE

## 2019-02-04 RX ORDER — DOXYCYCLINE 100 MG/1
100 CAPSULE ORAL 2 TIMES DAILY
Qty: 14 CAPSULE | Refills: 0 | Status: SHIPPED | OUTPATIENT
Start: 2019-02-04 | End: 2019-03-20

## 2019-02-04 RX ORDER — PROMETHAZINE HYDROCHLORIDE AND CODEINE PHOSPHATE 6.25; 1 MG/5ML; MG/5ML
5 SOLUTION ORAL EVERY 4 HOURS PRN
Qty: 120 ML | Refills: 0 | Status: SHIPPED | OUTPATIENT
Start: 2019-02-04 | End: 2019-02-14

## 2019-02-04 RX ORDER — PREDNISONE 20 MG/1
20 TABLET ORAL DAILY
Qty: 5 TABLET | Refills: 0 | Status: SHIPPED | OUTPATIENT
Start: 2019-02-04 | End: 2019-02-09

## 2019-02-04 NOTE — PATIENT INSTRUCTIONS
Sinusitis (Antibiotic Treatment)    The sinuses are air-filled spaces within the bones of the face. They connect to the inside of the nose. Sinusitis is an inflammation of the tissue lining the sinus cavity. Sinus inflammation can occur during a cold. It can also be due to allergies to pollens and other particles in the air. Sinusitis can cause symptoms of sinus congestion and fullness. A sinus infection causes fever, headache and facial pain. There is often green or yellow drainage from the nose or into the back of the throat (post-nasal drip). You have been given antibiotics to treat this condition.  Home care:  · Take the full course of antibiotics as instructed. Do not stop taking them, even if you feel better.  · Drink plenty of water, hot tea, and other liquids. This may help thin mucus. It also may promote sinus drainage.  · Heat may help soothe painful areas of the face. Use a towel soaked in hot water. Or,  the shower and direct the hot spray onto your face. Using a vaporizer along with a menthol rub at night may also help.   · An expectorant containing guaifenesin may help thin the mucus and promote drainage from the sinuses.  · Over-the-counter decongestants may be used unless a similar medicine was prescribed. Nasal sprays work the fastest. Use one that contains phenylephrine or oxymetazoline. First blow the nose gently. Then use the spray. Do not use these medicines more often than directed on the label or symptoms may get worse. You may also use tablets containing pseudoephedrine. Avoid products that combine ingredients, because side effects may be increased. Read labels. You can also ask the pharmacist for help. (NOTE: Persons with high blood pressure should not use decongestants. They can raise blood pressure.)  · Over-the-counter antihistamines may help if allergies contributed to your sinusitis.    · Do not use nasal rinses or irrigation during an acute sinus infection, unless told to by  your health care provider. Rinsing may spread the infection to other sinuses.  · Use acetaminophen or ibuprofen to control pain, unless another pain medicine was prescribed. (If you have chronic liver or kidney disease or ever had a stomach ulcer, talk with your doctor before using these medicines. Aspirin should never be used in anyone under 18 years of age who is ill with a fever. It may cause severe liver damage.)  · Don't smoke. This can worsen symptoms.  Follow-up care  Follow up with your healthcare provider or our staff if you are not improving within the next week.  When to seek medical advice  Call your healthcare provider if any of these occur:  · Facial pain or headache becoming more severe  · Stiff neck  · Unusual drowsiness or confusion  · Swelling of the forehead or eyelids  · Vision problems, including blurred or double vision  · Fever of 100.4ºF (38ºC) or higher, or as directed by your healthcare provider  · Seizure  · Breathing problems  · Symptoms not resolving within 10 days  Date Last Reviewed: 4/13/2015  © 6082-9377 [employer]. [address] [place] [address], [place] [address]. All rights reserved. This information is not intended as a substitute for professional medical care. Always follow your healthcare professional's instructions.

## 2019-02-04 NOTE — PROGRESS NOTES
Subjective:       Patient ID: Howard Segura is a 58 y.o. male.    Chief Complaint: URI (upper resp )    URI    This is a new problem. The current episode started in the past 7 days. The problem has been waxing and waning. There has been no fever. Associated symptoms include congestion. Pertinent negatives include no abdominal pain, chest pain, headaches, joint pain, joint swelling or sinus pain. The treatment provided mild relief.       Past Medical History:   Diagnosis Date    GERD (gastroesophageal reflux disease)     Hyperlipidemia     Hypertension     Kidney stone      Social History     Socioeconomic History    Marital status:      Spouse name: Not on file    Number of children: 0    Years of education: Not on file    Highest education level: Not on file   Social Needs    Financial resource strain: Not on file    Food insecurity - worry: Not on file    Food insecurity - inability: Not on file    Transportation needs - medical: Not on file    Transportation needs - non-medical: Not on file   Occupational History    Occupation: [employer]   Tobacco Use    Smoking status: Former Smoker     Packs/day: 0.50     Types: Cigarettes     Start date: 1980     Last attempt to quit: 2018     Years since quittin.5    Smokeless tobacco: Never Used    Tobacco comment: Counselled   Substance and Sexual Activity    Alcohol use: No    Drug use: No    Sexual activity: Yes     Partners: Female   Other Topics Concern    Not on file   Social History Narrative    Not on file     Past Surgical History:   Procedure Laterality Date    KNEE SURGERY      ROTATOR CUFF REPAIR      SINUS SURGERY       Family History   Problem Relation Age of Onset    Hepatitis Mother     Heart disease Father        Review of Systems   Constitutional: Positive for activity change. Negative for appetite change, chills, diaphoresis, fatigue and fever.   HENT: Positive for congestion. Negative  for dental problem, hearing loss, mouth sores and sinus pain.    Eyes: Negative for discharge and itching.   Respiratory: Negative for apnea and chest tightness.    Cardiovascular: Negative for chest pain.   Gastrointestinal: Negative for abdominal distention, abdominal pain and anal bleeding.   Endocrine: Negative for cold intolerance.   Genitourinary: Negative for difficulty urinating.   Musculoskeletal: Negative for joint pain.   Neurological: Negative for headaches.         Objective:      Blood pressure 138/88, pulse 89, temperature 98.4 °F (36.9 °C), height 6' (1.829 m), weight 110.7 kg (244 lb). Body mass index is 33.09 kg/m².  Physical Exam   Constitutional: He appears well-developed.   HENT:   Head: Normocephalic and atraumatic.   Nose: Mucosal edema present.   Mouth/Throat: Oropharynx is clear and moist. No oropharyngeal exudate, posterior oropharyngeal edema or posterior oropharyngeal erythema.   Eyes: Conjunctivae and EOM are normal.   Neck: Normal range of motion. Neck supple. No JVD present. No tracheal deviation present. No thyromegaly present.   Cardiovascular: Normal rate, regular rhythm and normal heart sounds. Exam reveals no gallop and no friction rub.   No murmur heard.  Pulmonary/Chest: Effort normal and breath sounds normal. No respiratory distress. He has no wheezes. He has no rales.   Abdominal: Soft. Bowel sounds are normal. He exhibits no distension. There is no tenderness.   Musculoskeletal: Normal range of motion. He exhibits edema (trace).   Psychiatric: He has a normal mood and affect.   Nursing note and vitals reviewed.        Assessment:       1. Acute non-recurrent maxillary sinusitis           No visits with results within 3 Month(s) from this visit.   Latest known visit with results is:   Orders Only on 10/23/2018   Component Date Value Ref Range Status    Microalbum.,U,Random 10/23/2018 24.4* 0.0 - 19.9 mcg/ml Final    Creatinine, Random Ur 10/23/2018 359.00  mg/dl Final     Microalb Creat Ratio 10/23/2018 7  0 - 30 Final         Plan:           Acute non-recurrent maxillary sinusitis  -     promethazine-codeine 6.25-10 mg/5 ml (PHENERGAN WITH CODEINE) 6.25-10 mg/5 mL syrup; Take 5 mLs by mouth every 4 (four) hours as needed for Cough. Do not drive if drowsy  Dispense: 120 mL; Refill: 0  -     doxycycline (MONODOX) 100 MG capsule; Take 1 capsule (100 mg total) by mouth 2 (two) times daily.  Dispense: 14 capsule; Refill: 0  -     predniSONE (DELTASONE) 20 MG tablet; Take 1 tablet (20 mg total) by mouth once daily. for 5 days  Dispense: 5 tablet; Refill: 0        Increase your water intake to 64-80 oz daily    Nasal Saline spray (Over the counter Laurel spray or Ayr)  2 sprays each nostril 2-3 times a day for nasal congestion    Tylenol 500 mg 2 tablets or Ibuprofen 200 mg 2 tablets every 4-6 hours as needed for fever, headaches, sore throat, ear pain, bodyaches, and/or nasal/sinus inflammation    Warm salt water gargles with 1/2 cup water and 1 tablespoon salt every 4 hours    Warm tea with honey and lemon    Follow up with PCP in 1 week if symptoms do not resolve            Current Outpatient Medications:     amlodipine-atorvastatin (CADUET) 5-20 mg per tablet, Take 1 tablet by mouth once daily., Disp: 90 tablet, Rfl: 3    clotrimazole-betamethasone 1-0.05% (LOTRISONE) cream, Apply topically 2 (two) times daily., Disp: 45 g, Rfl: 0    magnesium oxide (MAG-OX) 400 mg tablet, Take 1 tablet by mouth once daily., Disp: , Rfl:     metoprolol tartrate (LOPRESSOR) 100 MG tablet, Take 1 tablet (100 mg total) by mouth 2 (two) times daily., Disp: 180 tablet, Rfl: 3    omeprazole (PRILOSEC OTC) 20 MG tablet, Take 1 tablet by mouth once daily., Disp: , Rfl:     potassium chloride SA (KLOR-CON M10) 10 MEQ tablet, Take 1 tablet (10 mEq total) by mouth 2 (two) times daily., Disp: 180 tablet, Rfl: 2    triamterene-hydrochlorothiazide 37.5-25 mg (MAXZIDE-25) 37.5-25 mg per tablet, Take 1 tablet by  mouth once daily., Disp: 90 tablet, Rfl: 1    doxycycline (MONODOX) 100 MG capsule, Take 1 capsule (100 mg total) by mouth 2 (two) times daily., Disp: 14 capsule, Rfl: 0    predniSONE (DELTASONE) 20 MG tablet, Take 1 tablet (20 mg total) by mouth once daily. for 5 days, Disp: 5 tablet, Rfl: 0    promethazine-codeine 6.25-10 mg/5 ml (PHENERGAN WITH CODEINE) 6.25-10 mg/5 mL syrup, Take 5 mLs by mouth every 4 (four) hours as needed for Cough. Do not drive if drowsy, Disp: 120 mL, Rfl: 0

## 2019-02-11 ENCOUNTER — TELEPHONE (OUTPATIENT)
Dept: UROLOGY | Facility: CLINIC | Age: 59
End: 2019-02-11

## 2019-02-11 NOTE — TELEPHONE ENCOUNTER
I spoke with the pt and he states he has a 2 week hx of nauseating feeling while sitting. He thinks he may have a prostate infection. I scheduled him  For tomorrow with the understanding that he will have a wait. He verbalized understanding.

## 2019-02-11 NOTE — TELEPHONE ENCOUNTER
----- Message from Pina Amos sent at 2/11/2019  9:16 AM CST -----  Type:  Sooner Apoointment Request    Caller is requesting a sooner appointment.  Caller declined first available appointment listed below.  Caller will not accept being placed on the waitlist and is requesting a message be sent to doctor.    Name of Caller: self When is the first available appointment?  03/2019  Symptoms:  Prostate issues Best Call Back Number:  [phone]  Additional Information:

## 2019-02-12 ENCOUNTER — OFFICE VISIT (OUTPATIENT)
Dept: UROLOGY | Facility: CLINIC | Age: 59
End: 2019-02-12
Payer: COMMERCIAL

## 2019-02-12 VITALS
TEMPERATURE: 98 F | SYSTOLIC BLOOD PRESSURE: 137 MMHG | DIASTOLIC BLOOD PRESSURE: 89 MMHG | BODY MASS INDEX: 33.14 KG/M2 | HEIGHT: 72 IN | RESPIRATION RATE: 18 BRPM | HEART RATE: 87 BPM | WEIGHT: 244.69 LBS

## 2019-02-12 DIAGNOSIS — N40.0 BENIGN PROSTATIC HYPERPLASIA WITHOUT LOWER URINARY TRACT SYMPTOMS: Primary | ICD-10-CM

## 2019-02-12 DIAGNOSIS — R39.9 LOWER URINARY TRACT SYMPTOMS (LUTS): ICD-10-CM

## 2019-02-12 DIAGNOSIS — N20.0 RENAL CALCULI: ICD-10-CM

## 2019-02-12 PROCEDURE — 3079F DIAST BP 80-89 MM HG: CPT | Mod: CPTII,S$GLB,, | Performed by: UROLOGY

## 2019-02-12 PROCEDURE — 3079F PR MOST RECENT DIASTOLIC BLOOD PRESSURE 80-89 MM HG: ICD-10-PCS | Mod: CPTII,S$GLB,, | Performed by: UROLOGY

## 2019-02-12 PROCEDURE — 3008F PR BODY MASS INDEX (BMI) DOCUMENTED: ICD-10-PCS | Mod: CPTII,S$GLB,, | Performed by: UROLOGY

## 2019-02-12 PROCEDURE — 3075F PR MOST RECENT SYSTOLIC BLOOD PRESS GE 130-139MM HG: ICD-10-PCS | Mod: CPTII,S$GLB,, | Performed by: UROLOGY

## 2019-02-12 PROCEDURE — 99204 OFFICE O/P NEW MOD 45 MIN: CPT | Mod: 25,S$GLB,, | Performed by: UROLOGY

## 2019-02-12 PROCEDURE — 81000 URINALYSIS NONAUTO W/SCOPE: CPT | Mod: S$GLB,,, | Performed by: UROLOGY

## 2019-02-12 PROCEDURE — 99999 PR PBB SHADOW E&M-EST. PATIENT-LVL IV: ICD-10-PCS | Mod: PBBFAC,,, | Performed by: UROLOGY

## 2019-02-12 PROCEDURE — 81000 CHG URINALYSIS, NONAUTO, W/SCOPE: ICD-10-PCS | Mod: S$GLB,,, | Performed by: UROLOGY

## 2019-02-12 PROCEDURE — 99999 PR PBB SHADOW E&M-EST. PATIENT-LVL IV: CPT | Mod: PBBFAC,,, | Performed by: UROLOGY

## 2019-02-12 PROCEDURE — 3008F BODY MASS INDEX DOCD: CPT | Mod: CPTII,S$GLB,, | Performed by: UROLOGY

## 2019-02-12 PROCEDURE — 99204 PR OFFICE/OUTPT VISIT, NEW, LEVL IV, 45-59 MIN: ICD-10-PCS | Mod: 25,S$GLB,, | Performed by: UROLOGY

## 2019-02-12 PROCEDURE — 3075F SYST BP GE 130 - 139MM HG: CPT | Mod: CPTII,S$GLB,, | Performed by: UROLOGY

## 2019-02-12 RX ORDER — SULFAMETHOXAZOLE AND TRIMETHOPRIM 800; 160 MG/1; MG/1
1 TABLET ORAL 2 TIMES DAILY
Qty: 30 TABLET | Refills: 0 | Status: SHIPPED | OUTPATIENT
Start: 2019-02-12 | End: 2019-02-22

## 2019-02-13 NOTE — PROGRESS NOTES
OFFICE NOTE, HISTORY AND PHYSICAL, NEW PATIENT     AGE:  58    DRUG ALLERGIES:  NKDA.    CHIEF COMPLAINT:  Lower urinary tract symptoms and the patient is suspecting he   may have prostatitis.    HISTORY OF PRESENT ILLNESS:  This 58-year-old male returns for routine recheck.    He was treated for sinus problem several weeks ago and was also given Nyquil   and he subsequently developed lower urinary tract symptoms for which he was   placed on doxycycline, which did result in some improvement in his symptoms, but   they still have not completely resolved.  Other  history includes a history   of erectile dysfunction for which he has not received any treatment, but he states   he does wake up in the morning, with essentially normal erections.  Other    history significance of the history of urinary calculi, for which we had last   seen him in .  He has had no further stone episodes since then, but he also   has not undergone any further evaluation or imaging studies to determine the   status of the stone.    PAST MEDICAL HISTORY:  Hypertension, hyperlipidemia, hypokalemia, GERD, and   PACS.    PAST SURGICAL HISTORY:  Knee surgery, rotator cuff surgery, and sinus surgery.    PRESENT MEDICATIONS:  Include Lotrisone, Maxzide, Prilosec, Klor-Con, Lopressor,   and Caduet.    FAMILY HISTORY:  Father  from [place].  Mother living and well.  No siblings.    SOCIAL HISTORY:  He works as a , , no children.  He quit   smoking since his last visit in .  Alcohol none.    REVIEW OF SYMPTOMS:  GENERAL:  No weight change.  Good appetite.  HEAD AND NECK:  No headaches.  Wears reading glasses.  CARDIORESPIRATORY:  No chest pain, shortness of breath.  No cough.  GASTROINTESTINAL:  No constipation or diarrhea.  MUSCULOSKELETAL:  No joint or back problems.    PHYSICAL EXAMINATION:  VITAL SIGNS:  Reveals /89, pulse 87, temperature 98.4, respirations 18,   weight 111 kilos.  GENERAL:  Well-developed,  well-nourished 58-year-old male, alert, oriented,   cooperative, in no acute distress.  HEAD AND NECK:  Throat clear.  No adenopathies.  CHEST:  Clear.  HEART:  Reveals an irregular rhythm consistent with his history of PACS.  ABDOMEN:  Soft, benign and nontender.  No masses.  No organomegaly.  EXTERNAL GENITALIA:  Normal phallus with adequate meatus.  Testes descended and   feel normal.  No scrotal masses.  RECTAL:  Reveals somewhat 25-30 g smooth, although somewhat boggy prostate   suggestive of some possible prostatitis.    UA negative with pH 6.0.    FINAL IMPRESSION:  Lower tract symptoms, possible prostatitis, and history of   urinary calculi.  Possible erectile dysfunction.    RECOMMENDATIONS:  Trial of Bactrim DS p.o. b.i.d.  We will also obtain a KUB and   renal ultrasound to observe the status of any calculi and also of his kidneys   and we will contact him with the findings.  We could subsequently further address his erectile problems.      VENKAT  dd: 02/12/2019 18:07:39 (CST)  td: 02/13/2019 04:09:50 (CST)  Doc ID   #[id number]  Job ID #[id number]    CC:

## 2019-02-19 ENCOUNTER — TELEPHONE (OUTPATIENT)
Dept: UROLOGY | Facility: CLINIC | Age: 59
End: 2019-02-19

## 2019-02-19 NOTE — TELEPHONE ENCOUNTER
Spoke with patient, US and KUB at [facility] results given with recommendation, patient will call back when ready to schedule f/u, patient verbally understood.

## 2019-02-19 NOTE — TELEPHONE ENCOUNTER
----- Message from Miriam Woodward MD sent at 2/19/2019  9:34 AM CST -----  Renal ultrasound and KUB on 02/18/2019 revealed multiple tiny left renal calculi otherwise no other significant findings and no ureteral calculi.    Rec; routine recheck in 4-6 weeks

## 2019-03-04 RX ORDER — TRIAMTERENE/HYDROCHLOROTHIAZID 37.5-25 MG
TABLET ORAL
Qty: 90 TABLET | Refills: 3 | Status: SHIPPED | OUTPATIENT
Start: 2019-03-04 | End: 2019-03-07 | Stop reason: SDUPTHER

## 2019-03-07 ENCOUNTER — PATIENT MESSAGE (OUTPATIENT)
Dept: FAMILY MEDICINE | Facility: CLINIC | Age: 59
End: 2019-03-07

## 2019-03-07 DIAGNOSIS — J30.1 NON-SEASONAL ALLERGIC RHINITIS DUE TO POLLEN: Primary | ICD-10-CM

## 2019-03-07 RX ORDER — AMLODIPINE BESYLATE AND ATORVASTATIN CALCIUM 5; 20 MG/1; MG/1
1 TABLET, FILM COATED ORAL DAILY
Qty: 90 TABLET | Refills: 3 | Status: SHIPPED | OUTPATIENT
Start: 2019-03-07 | End: 2020-04-20

## 2019-03-07 RX ORDER — FLUTICASONE PROPIONATE 50 MCG
2 SPRAY, SUSPENSION (ML) NASAL DAILY
Qty: 3 BOTTLE | Refills: 2 | Status: SHIPPED | OUTPATIENT
Start: 2019-03-07 | End: 2020-06-03

## 2019-03-07 RX ORDER — TRIAMTERENE/HYDROCHLOROTHIAZID 37.5-25 MG
1 TABLET ORAL DAILY
Qty: 90 TABLET | Refills: 3 | Status: SHIPPED | OUTPATIENT
Start: 2019-03-07 | End: 2020-03-10

## 2019-03-07 RX ORDER — METOPROLOL TARTRATE 100 MG/1
100 TABLET ORAL 2 TIMES DAILY
Qty: 180 TABLET | Refills: 3 | Status: SHIPPED | OUTPATIENT
Start: 2019-03-07 | End: 2020-04-22

## 2019-03-18 ENCOUNTER — PATIENT MESSAGE (OUTPATIENT)
Dept: FAMILY MEDICINE | Facility: CLINIC | Age: 59
End: 2019-03-18

## 2019-03-20 ENCOUNTER — OFFICE VISIT (OUTPATIENT)
Dept: FAMILY MEDICINE | Facility: CLINIC | Age: 59
End: 2019-03-20
Payer: COMMERCIAL

## 2019-03-20 VITALS
WEIGHT: 244 LBS | HEART RATE: 77 BPM | TEMPERATURE: 98 F | HEIGHT: 72 IN | BODY MASS INDEX: 33.05 KG/M2 | DIASTOLIC BLOOD PRESSURE: 88 MMHG | SYSTOLIC BLOOD PRESSURE: 130 MMHG

## 2019-03-20 DIAGNOSIS — J34.2 DEVIATED NASAL SEPTUM: ICD-10-CM

## 2019-03-20 DIAGNOSIS — J32.0 CHRONIC MAXILLARY SINUSITIS: ICD-10-CM

## 2019-03-20 DIAGNOSIS — J31.0 RHINITIS MEDICAMENTOSA: ICD-10-CM

## 2019-03-20 DIAGNOSIS — T48.5X5A RHINITIS MEDICAMENTOSA: ICD-10-CM

## 2019-03-20 DIAGNOSIS — J30.0 ACUTE VASOMOTOR RHINITIS: Primary | ICD-10-CM

## 2019-03-20 PROCEDURE — 99213 OFFICE O/P EST LOW 20 MIN: CPT | Mod: 25,,, | Performed by: INTERNAL MEDICINE

## 2019-03-20 PROCEDURE — 3008F PR BODY MASS INDEX (BMI) DOCUMENTED: ICD-10-PCS | Mod: ,,, | Performed by: INTERNAL MEDICINE

## 2019-03-20 PROCEDURE — 99213 PR OFFICE/OUTPT VISIT, EST, LEVL III, 20-29 MIN: ICD-10-PCS | Mod: 25,,, | Performed by: INTERNAL MEDICINE

## 2019-03-20 PROCEDURE — 96372 THER/PROPH/DIAG INJ SC/IM: CPT | Mod: ,,, | Performed by: INTERNAL MEDICINE

## 2019-03-20 PROCEDURE — 3075F PR MOST RECENT SYSTOLIC BLOOD PRESS GE 130-139MM HG: ICD-10-PCS | Mod: ,,, | Performed by: INTERNAL MEDICINE

## 2019-03-20 PROCEDURE — 3008F BODY MASS INDEX DOCD: CPT | Mod: ,,, | Performed by: INTERNAL MEDICINE

## 2019-03-20 PROCEDURE — 3079F DIAST BP 80-89 MM HG: CPT | Mod: ,,, | Performed by: INTERNAL MEDICINE

## 2019-03-20 PROCEDURE — 96372 PR INJECTION,THERAP/PROPH/DIAG2ST, IM OR SUBCUT: ICD-10-PCS | Mod: ,,, | Performed by: INTERNAL MEDICINE

## 2019-03-20 PROCEDURE — 3075F SYST BP GE 130 - 139MM HG: CPT | Mod: ,,, | Performed by: INTERNAL MEDICINE

## 2019-03-20 PROCEDURE — 3079F PR MOST RECENT DIASTOLIC BLOOD PRESSURE 80-89 MM HG: ICD-10-PCS | Mod: ,,, | Performed by: INTERNAL MEDICINE

## 2019-03-20 RX ORDER — BETAMETHASONE SODIUM PHOSPHATE AND BETAMETHASONE ACETATE 3; 3 MG/ML; MG/ML
6 INJECTION, SUSPENSION INTRA-ARTICULAR; INTRALESIONAL; INTRAMUSCULAR; SOFT TISSUE
Status: DISCONTINUED | OUTPATIENT
Start: 2019-03-20 | End: 2019-03-20

## 2019-03-20 RX ORDER — AZELASTINE 1 MG/ML
1 SPRAY, METERED NASAL 2 TIMES DAILY
Qty: 30 ML | Refills: 2 | Status: SHIPPED | OUTPATIENT
Start: 2019-03-20 | End: 2019-12-21 | Stop reason: CLARIF

## 2019-03-20 RX ORDER — TRIAMCINOLONE ACETONIDE 40 MG/ML
40 INJECTION, SUSPENSION INTRA-ARTICULAR; INTRAMUSCULAR
Status: COMPLETED | OUTPATIENT
Start: 2019-03-20 | End: 2019-03-20

## 2019-03-20 RX ADMIN — TRIAMCINOLONE ACETONIDE 40 MG: 40 INJECTION, SUSPENSION INTRA-ARTICULAR; INTRAMUSCULAR at 04:03

## 2019-03-20 NOTE — PROGRESS NOTES
Subjective:       Patient ID: Howard Segura is a 58 y.o. male.    Chief Complaint: Sinus Problem    Mr. Howard Segura is a 58-year-old  male who has been experiencing sinus pain and discomfort for several months to years. In fact he had couple sinus surgery several years ago by Dr. Johnson. This is possibly a combination of allergic rhinitis versus structural sinus problem.    He has been trying Afrin nasal spray for several days and symptoms seem to be getting worse.      Sinus Problem   This is a chronic problem. The current episode started more than 1 month ago. The problem has been waxing and waning since onset. There has been no fever. His pain is at a severity of 3/10. Associated symptoms include congestion. Pertinent negatives include no chills, diaphoresis or headaches. Past treatments include saline sprays and spray decongestants (flonase/). The treatment provided no relief.       Past Medical History:   Diagnosis Date    GERD (gastroesophageal reflux disease)     Hyperlipidemia     Hypertension     Kidney stone      Social History     Socioeconomic History    Marital status:      Spouse name: Not on file    Number of children: 0    Years of education: Not on file    Highest education level: Not on file   Social Needs    Financial resource strain: Not on file    Food insecurity - worry: Not on file    Food insecurity - inability: Not on file    Transportation needs - medical: Not on file    Transportation needs - non-medical: Not on file   Occupational History    Occupation: [employer]   Tobacco Use    Smoking status: Former Smoker     Packs/day: 0.50     Types: Cigarettes     Start date: 1980     Last attempt to quit: 2018     Years since quittin.6    Smokeless tobacco: Never Used    Tobacco comment: Counselled   Substance and Sexual Activity    Alcohol use: No    Drug use: No    Sexual activity: Yes     Partners: Female   Other Topics  Concern    Not on file   Social History Narrative    Not on file     Past Surgical History:   Procedure Laterality Date    KNEE SURGERY      ROTATOR CUFF REPAIR      SINUS SURGERY       Family History   Problem Relation Age of Onset    Hepatitis Mother     Heart disease Father        Review of Systems   Constitutional: Positive for activity change. Negative for appetite change, chills, diaphoresis, fatigue and fever.   HENT: Positive for congestion. Negative for dental problem, hearing loss, mouth sores and sinus pain.    Eyes: Negative for discharge and itching.   Respiratory: Negative for apnea and chest tightness.    Cardiovascular: Negative for chest pain.   Gastrointestinal: Negative for abdominal distention, abdominal pain and anal bleeding.   Endocrine: Negative for cold intolerance.   Genitourinary: Negative for difficulty urinating.   Neurological: Negative for headaches.         Objective:      Blood pressure 130/88, pulse 77, temperature 98 °F (36.7 °C), height 6' (1.829 m), weight 110.7 kg (244 lb). Body mass index is 33.09 kg/m².  Physical Exam   Constitutional: He appears well-developed.   HENT:   Head: Normocephalic and atraumatic.   Nose: Mucosal edema and septal deviation (towards right) present. Right sinus exhibits maxillary sinus tenderness. Left sinus exhibits maxillary sinus tenderness.   Mouth/Throat: Oropharynx is clear and moist. No oropharyngeal exudate, posterior oropharyngeal edema or posterior oropharyngeal erythema.   Eyes: Conjunctivae and EOM are normal.   Neck: Normal range of motion. Neck supple. No JVD present. No tracheal deviation present. No thyromegaly present.   Cardiovascular: Normal rate, regular rhythm and normal heart sounds. Exam reveals no gallop and no friction rub.   No murmur heard.  Pulmonary/Chest: Effort normal and breath sounds normal. No respiratory distress. He has no wheezes. He has no rales.   Abdominal: Soft. He exhibits no distension.    Musculoskeletal: Normal range of motion. He exhibits edema (trace).   Psychiatric: He has a normal mood and affect.   Nursing note and vitals reviewed.        Assessment:       1. Acute vasomotor rhinitis    2. Chronic maxillary sinusitis    3. Deviated nasal septum    4. Rhinitis medicamentosa           Pt seems to have persistent symptoms of sinus congestion worsened by recent pollen and continued use of Afrin nasal spray. He probably has some component of rhinitis medicamentosa.      Plan:           Acute vasomotor rhinitis  -     Discontinue: betamethasone acetate-betamethasone sodium phosphate injection 6 mg  -     azelastine (ASTELIN) 137 mcg (0.1 %) nasal spray; 1 spray (137 mcg total) by Nasal route 2 (two) times daily.  Dispense: 30 mL; Refill: 2  -     triamcinolone acetonide injection 40 mg    Chronic maxillary sinusitis    Deviated nasal septum    Rhinitis medicamentosa      I've advised patient to completely stop Afrin nasal spray at this point. Add Astelin nasal spray besides Flonase. Doing nasal irrigation. Injection triamcinolone given. If persistent symptoms we'll consider imaging either with x-ray or CT scan and ENT referral. Keep regular appointment.      Current Outpatient Medications:     amlodipine-atorvastatin (CADUET) 5-20 mg per tablet, Take 1 tablet by mouth once daily., Disp: 90 tablet, Rfl: 3    clotrimazole-betamethasone 1-0.05% (LOTRISONE) cream, Apply topically 2 (two) times daily., Disp: 45 g, Rfl: 0    fluticasone (FLONASE) 50 mcg/actuation nasal spray, 2 sprays (100 mcg total) by Each Nare route once daily., Disp: 3 Bottle, Rfl: 2    magnesium oxide (MAG-OX) 400 mg tablet, Take 1 tablet by mouth once daily., Disp: , Rfl:     metoprolol tartrate (LOPRESSOR) 100 MG tablet, Take 1 tablet (100 mg total) by mouth 2 (two) times daily., Disp: 180 tablet, Rfl: 3    omeprazole (PRILOSEC OTC) 20 MG tablet, Take 1 tablet by mouth once daily., Disp: , Rfl:     potassium chloride SA  (KLOR-CON M10) 10 MEQ tablet, Take 1 tablet (10 mEq total) by mouth 2 (two) times daily., Disp: 180 tablet, Rfl: 2    triamterene-hydrochlorothiazide 37.5-25 mg (MAXZIDE-25) 37.5-25 mg per tablet, Take 1 tablet by mouth once daily., Disp: 90 tablet, Rfl: 3    azelastine (ASTELIN) 137 mcg (0.1 %) nasal spray, 1 spray (137 mcg total) by Nasal route 2 (two) times daily., Disp: 30 mL, Rfl: 2  No current facility-administered medications for this visit.

## 2019-03-20 NOTE — PATIENT INSTRUCTIONS
"  Understanding Nasal Anatomy: Inside View  A lot happens under the surface of the nose. The bone and cartilage under the skin give the nose most of its size and shape. Other structures inside and behind the nose help you breathe. Learning the anatomy of the nose can help you better understand how the nose works.           Bone supports the upper third (bridge) of the nose. The upper cartilage supports the side of the nose. The lower cartilage adds support, width, and height. It helps shape the nostrils and the tip of the nose. Skin also helps shape the nose.     The nasal cavity is a hollow space behind the nose that air flows through. The septum is a thin "wall" made of cartilage and bone. It divides the inside of the nose into two chambers. The mucous membrane is thin tissue that lines the nose, sinuses, and throat. It warms and moistens the air you breathe in. It also makes the sticky mucus that helps clean the air of dust and other small particles. The turbinates on each side of the nose are curved, bony ridges lined with mucous membrane. They warm and moisten the air you breathe in. The sinuses are hollow, air-filled chambers in the bone around your nose. Mucus from the sinuses drains into the nasal cavity.  Date Last Reviewed: 11/1/2016  © 0981-2164 [employer]. [address] [place] [address], [place] [address]. All rights reserved. This information is not intended as a substitute for professional medical care. Always follow your healthcare professional's instructions.        "

## 2019-06-11 DIAGNOSIS — R21 RASH: ICD-10-CM

## 2019-06-11 RX ORDER — CLOTRIMAZOLE AND BETAMETHASONE DIPROPIONATE 10; .64 MG/G; MG/G
CREAM TOPICAL
Qty: 45 G | Refills: 0 | Status: SHIPPED | OUTPATIENT
Start: 2019-06-11 | End: 2020-04-13

## 2019-07-15 RX ORDER — POTASSIUM CHLORIDE 750 MG/1
TABLET, EXTENDED RELEASE ORAL
Qty: 180 TABLET | Refills: 1 | Status: SHIPPED | OUTPATIENT
Start: 2019-07-15 | End: 2020-05-15

## 2019-10-16 ENCOUNTER — PATIENT MESSAGE (OUTPATIENT)
Dept: FAMILY MEDICINE | Facility: CLINIC | Age: 59
End: 2019-10-16

## 2019-10-16 DIAGNOSIS — Z12.5 SCREENING FOR PROSTATE CANCER: ICD-10-CM

## 2019-10-16 DIAGNOSIS — E78.49 FAMILIAL COMBINED HYPERLIPIDEMIA: ICD-10-CM

## 2019-10-16 DIAGNOSIS — I10 BENIGN ESSENTIAL HYPERTENSION: Primary | ICD-10-CM

## 2019-10-24 ENCOUNTER — PATIENT MESSAGE (OUTPATIENT)
Dept: FAMILY MEDICINE | Facility: CLINIC | Age: 59
End: 2019-10-24

## 2019-10-24 NOTE — TELEPHONE ENCOUNTER
Lab requests sent to the hospital for CMP, lipid panel, urine microalbumin and PSA screening.  Patient notified.

## 2019-10-30 ENCOUNTER — PATIENT MESSAGE (OUTPATIENT)
Dept: FAMILY MEDICINE | Facility: CLINIC | Age: 59
End: 2019-10-30

## 2019-10-30 ENCOUNTER — LAB VISIT (OUTPATIENT)
Dept: LAB | Facility: HOSPITAL | Age: 59
End: 2019-10-30
Attending: INTERNAL MEDICINE
Payer: COMMERCIAL

## 2019-10-30 DIAGNOSIS — I10 BENIGN ESSENTIAL HYPERTENSION: ICD-10-CM

## 2019-10-30 DIAGNOSIS — Z12.5 SCREENING FOR PROSTATE CANCER: ICD-10-CM

## 2019-10-30 DIAGNOSIS — E78.49 FAMILIAL COMBINED HYPERLIPIDEMIA: ICD-10-CM

## 2019-10-30 LAB
ALBUMIN SERPL BCP-MCNC: 4.7 G/DL (ref 3.5–5.2)
ALBUMIN/CREAT UR: 15.3 UG/MG (ref 0–30)
ALP SERPL-CCNC: 55 U/L (ref 55–135)
ALT SERPL W/O P-5'-P-CCNC: 39 U/L (ref 10–44)
ANION GAP SERPL CALC-SCNC: 9 MMOL/L (ref 8–16)
AST SERPL-CCNC: 29 U/L (ref 10–40)
BILIRUB SERPL-MCNC: 1.2 MG/DL (ref 0.1–1)
BUN SERPL-MCNC: 14 MG/DL (ref 6–20)
CALCIUM SERPL-MCNC: 9 MG/DL (ref 8.7–10.5)
CHLORIDE SERPL-SCNC: 101 MMOL/L (ref 95–110)
CHOLEST SERPL-MCNC: 134 MG/DL (ref 120–199)
CHOLEST/HDLC SERPL: 4.2 {RATIO} (ref 2–5)
CO2 SERPL-SCNC: 30 MMOL/L (ref 23–29)
COMPLEXED PSA SERPL-MCNC: 1.6 NG/ML (ref 0–4)
CREAT SERPL-MCNC: 0.9 MG/DL (ref 0.5–1.4)
CREAT UR-MCNC: 70 MG/DL (ref 23–375)
EST. GFR  (AFRICAN AMERICAN): >60 ML/MIN/1.73 M^2
EST. GFR  (NON AFRICAN AMERICAN): >60 ML/MIN/1.73 M^2
GLUCOSE SERPL-MCNC: 117 MG/DL (ref 70–110)
HDLC SERPL-MCNC: 32 MG/DL (ref 40–75)
HDLC SERPL: 23.9 % (ref 20–50)
LDLC SERPL CALC-MCNC: 57 MG/DL (ref 63–159)
MICROALBUMIN UR DL<=1MG/L-MCNC: 10.7 UG/ML
NONHDLC SERPL-MCNC: 102 MG/DL
POTASSIUM SERPL-SCNC: 3.7 MMOL/L (ref 3.5–5.1)
PROT SERPL-MCNC: 6.9 G/DL (ref 6–8.4)
SODIUM SERPL-SCNC: 140 MMOL/L (ref 136–145)
TRIGL SERPL-MCNC: 225 MG/DL (ref 30–150)

## 2019-10-30 PROCEDURE — 80061 LIPID PANEL: CPT

## 2019-10-30 PROCEDURE — 80053 COMPREHEN METABOLIC PANEL: CPT

## 2019-10-30 PROCEDURE — 84153 ASSAY OF PSA TOTAL: CPT

## 2019-10-30 PROCEDURE — 82043 UR ALBUMIN QUANTITATIVE: CPT

## 2019-10-30 PROCEDURE — 36415 COLL VENOUS BLD VENIPUNCTURE: CPT

## 2019-10-30 NOTE — PROGRESS NOTES
Please review your labs including lipid panel prior to follow-up.  Triglycerides are little elevated at 225.  Total cholesterol is good.  LDL is 57 which is good.  Dr. Jessica BARBER

## 2019-12-21 ENCOUNTER — HOSPITAL ENCOUNTER (EMERGENCY)
Facility: HOSPITAL | Age: 59
Discharge: ANOTHER HOSPITAL | End: 2019-12-22
Attending: EMERGENCY MEDICINE
Payer: COMMERCIAL
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
## 2019-12-21 DIAGNOSIS — Z01.818 PREOP EXAMINATION: ICD-10-CM

## 2019-12-21 DIAGNOSIS — I49.9 IRREGULAR HEARTBEAT: ICD-10-CM

## 2019-12-21 DIAGNOSIS — I48.91 NEW ONSET A-FIB: ICD-10-CM

## 2019-12-21 DIAGNOSIS — S72.001A CLOSED RIGHT HIP FRACTURE, INITIAL ENCOUNTER: Primary | ICD-10-CM

## 2019-12-21 PROCEDURE — 63600175 PHARM REV CODE 636 W HCPCS: Performed by: EMERGENCY MEDICINE

## 2019-12-21 PROCEDURE — 71045 X-RAY EXAM CHEST 1 VIEW: CPT | Mod: TC,FY

## 2019-12-21 PROCEDURE — 73552 XR FEMUR 2 VIEW RIGHT: ICD-10-PCS | Mod: 26,RT,, | Performed by: RADIOLOGY

## 2019-12-21 PROCEDURE — 73552 X-RAY EXAM OF FEMUR 2/>: CPT | Mod: 26,RT,, | Performed by: RADIOLOGY

## 2019-12-21 PROCEDURE — 96375 TX/PRO/DX INJ NEW DRUG ADDON: CPT

## 2019-12-21 PROCEDURE — 96361 HYDRATE IV INFUSION ADD-ON: CPT

## 2019-12-21 PROCEDURE — 71045 X-RAY EXAM CHEST 1 VIEW: CPT | Mod: 26,,, | Performed by: RADIOLOGY

## 2019-12-21 PROCEDURE — 96376 TX/PRO/DX INJ SAME DRUG ADON: CPT

## 2019-12-21 PROCEDURE — 93005 ELECTROCARDIOGRAM TRACING: CPT

## 2019-12-21 PROCEDURE — 99285 EMERGENCY DEPT VISIT HI MDM: CPT | Mod: 25

## 2019-12-21 PROCEDURE — 96374 THER/PROPH/DIAG INJ IV PUSH: CPT

## 2019-12-21 PROCEDURE — 71045 XR CHEST 1 VIEW: ICD-10-PCS | Mod: 26,,, | Performed by: RADIOLOGY

## 2019-12-21 PROCEDURE — 73552 X-RAY EXAM OF FEMUR 2/>: CPT | Mod: TC,FY,RT

## 2019-12-21 RX ORDER — ONDANSETRON 2 MG/ML
4 INJECTION INTRAMUSCULAR; INTRAVENOUS
Status: COMPLETED | OUTPATIENT
Start: 2019-12-21 | End: 2019-12-21

## 2019-12-21 RX ORDER — HYDROMORPHONE HYDROCHLORIDE 2 MG/ML
1 INJECTION, SOLUTION INTRAMUSCULAR; INTRAVENOUS; SUBCUTANEOUS
Status: COMPLETED | OUTPATIENT
Start: 2019-12-21 | End: 2019-12-21

## 2019-12-21 RX ORDER — SODIUM CHLORIDE 9 MG/ML
1000 INJECTION, SOLUTION INTRAVENOUS
Status: COMPLETED | OUTPATIENT
Start: 2019-12-21 | End: 2019-12-22

## 2019-12-21 RX ADMIN — ONDANSETRON 4 MG: 2 INJECTION INTRAMUSCULAR; INTRAVENOUS at 10:12

## 2019-12-21 RX ADMIN — HYDROMORPHONE HYDROCHLORIDE 1 MG: 2 INJECTION, SOLUTION INTRAMUSCULAR; INTRAVENOUS; SUBCUTANEOUS at 10:12

## 2019-12-22 ENCOUNTER — ANESTHESIA EVENT (OUTPATIENT)
Dept: SURGERY | Facility: HOSPITAL | Age: 59
DRG: 482 | End: 2019-12-22
Payer: COMMERCIAL

## 2019-12-22 ENCOUNTER — HOSPITAL ENCOUNTER (INPATIENT)
Facility: HOSPITAL | Age: 59
LOS: 3 days | Discharge: HOME | DRG: 482 | End: 2019-12-25
Attending: INTERNAL MEDICINE | Admitting: INTERNAL MEDICINE
Payer: COMMERCIAL

## 2019-12-22 ENCOUNTER — ANESTHESIA (OUTPATIENT)
Dept: SURGERY | Facility: HOSPITAL | Age: 59
DRG: 482 | End: 2019-12-22
Payer: COMMERCIAL

## 2019-12-22 VITALS
HEART RATE: 102 BPM | OXYGEN SATURATION: 95 % | WEIGHT: 240 LBS | SYSTOLIC BLOOD PRESSURE: 132 MMHG | DIASTOLIC BLOOD PRESSURE: 97 MMHG | BODY MASS INDEX: 32.51 KG/M2 | HEIGHT: 72 IN | TEMPERATURE: 98 F | RESPIRATION RATE: 14 BRPM

## 2019-12-22 DIAGNOSIS — M25.561 PAIN AND SWELLING OF KNEE, RIGHT: ICD-10-CM

## 2019-12-22 DIAGNOSIS — S72.001A FRACTURE OF FEMORAL NECK, RIGHT: ICD-10-CM

## 2019-12-22 DIAGNOSIS — I10 BENIGN ESSENTIAL HYPERTENSION: ICD-10-CM

## 2019-12-22 DIAGNOSIS — M25.461 PAIN AND SWELLING OF KNEE, RIGHT: ICD-10-CM

## 2019-12-22 DIAGNOSIS — S72.001A CLOSED FRACTURE OF NECK OF RIGHT FEMUR, INITIAL ENCOUNTER: Primary | ICD-10-CM

## 2019-12-22 DIAGNOSIS — E78.2 MIXED DYSLIPIDEMIA: ICD-10-CM

## 2019-12-22 DIAGNOSIS — I48.91 ATRIAL FIBRILLATION: ICD-10-CM

## 2019-12-22 DIAGNOSIS — R07.9 CHEST PAIN: ICD-10-CM

## 2019-12-22 DIAGNOSIS — K21.9 GERD WITHOUT ESOPHAGITIS: ICD-10-CM

## 2019-12-22 DIAGNOSIS — E66.9 OBESITY WITH BODY MASS INDEX 30 OR GREATER: ICD-10-CM

## 2019-12-22 DIAGNOSIS — S72.001A CLOSED FRACTURE OF NECK OF RIGHT FEMUR: ICD-10-CM

## 2019-12-22 PROBLEM — Z87.891 FORMER SMOKER: Status: ACTIVE | Noted: 2018-05-08

## 2019-12-22 PROBLEM — S72.91XA CLOSED FRACTURE OF RIGHT FEMUR: Status: ACTIVE | Noted: 2019-12-22

## 2019-12-22 PROBLEM — I48.0 PAROXYSMAL ATRIAL FIBRILLATION: Status: ACTIVE | Noted: 2019-12-22

## 2019-12-22 LAB
ABO + RH BLD: NORMAL
ALBUMIN SERPL BCP-MCNC: 4.2 G/DL (ref 3.5–5.2)
ALP SERPL-CCNC: 52 U/L (ref 55–135)
ALT SERPL W/O P-5'-P-CCNC: 41 U/L (ref 10–44)
ANION GAP SERPL CALC-SCNC: 6 MMOL/L (ref 8–16)
ANION GAP SERPL CALC-SCNC: 9 MMOL/L (ref 8–16)
APTT BLDCRRT: 22.8 SEC (ref 21–32)
AST SERPL-CCNC: 35 U/L (ref 10–40)
BASOPHILS # BLD AUTO: 0.03 K/UL (ref 0–0.2)
BASOPHILS # BLD AUTO: 0.06 K/UL (ref 0–0.2)
BASOPHILS NFR BLD: 0.3 % (ref 0–1.9)
BASOPHILS NFR BLD: 0.5 % (ref 0–1.9)
BILIRUB SERPL-MCNC: 0.9 MG/DL (ref 0.1–1)
BILIRUB UR QL STRIP: NEGATIVE
BLD GP AB SCN CELLS X3 SERPL QL: NORMAL
BUN SERPL-MCNC: 18 MG/DL (ref 6–20)
BUN SERPL-MCNC: 21 MG/DL (ref 6–20)
CALCIUM SERPL-MCNC: 8.3 MG/DL (ref 8.7–10.5)
CALCIUM SERPL-MCNC: 8.4 MG/DL (ref 8.7–10.5)
CHLORIDE SERPL-SCNC: 103 MMOL/L (ref 95–110)
CHLORIDE SERPL-SCNC: 107 MMOL/L (ref 95–110)
CLARITY UR: CLEAR
CO2 SERPL-SCNC: 27 MMOL/L (ref 23–29)
CO2 SERPL-SCNC: 29 MMOL/L (ref 23–29)
COLOR UR: YELLOW
CREAT SERPL-MCNC: 0.9 MG/DL (ref 0.5–1.4)
CREAT SERPL-MCNC: 1.1 MG/DL (ref 0.5–1.4)
DIFFERENTIAL METHOD: ABNORMAL
DIFFERENTIAL METHOD: ABNORMAL
EOSINOPHIL # BLD AUTO: 0.1 K/UL (ref 0–0.5)
EOSINOPHIL # BLD AUTO: 0.2 K/UL (ref 0–0.5)
EOSINOPHIL NFR BLD: 0.9 % (ref 0–8)
EOSINOPHIL NFR BLD: 2 % (ref 0–8)
ERYTHROCYTE [DISTWIDTH] IN BLOOD BY AUTOMATED COUNT: 12.7 % (ref 11.5–14.5)
ERYTHROCYTE [DISTWIDTH] IN BLOOD BY AUTOMATED COUNT: 13 % (ref 11.5–14.5)
EST. GFR  (AFRICAN AMERICAN): >60 ML/MIN/1.73 M^2
EST. GFR  (AFRICAN AMERICAN): >60 ML/MIN/1.73 M^2
EST. GFR  (NON AFRICAN AMERICAN): >60 ML/MIN/1.73 M^2
EST. GFR  (NON AFRICAN AMERICAN): >60 ML/MIN/1.73 M^2
ESTIMATED AVG GLUCOSE: 111 MG/DL (ref 68–131)
GLUCOSE SERPL-MCNC: 120 MG/DL (ref 70–110)
GLUCOSE SERPL-MCNC: 132 MG/DL (ref 70–110)
GLUCOSE UR QL STRIP: NEGATIVE
HBA1C MFR BLD HPLC: 5.5 % (ref 4–5.6)
HCT VFR BLD AUTO: 35.7 % (ref 40–54)
HCT VFR BLD AUTO: 38.6 % (ref 40–54)
HGB BLD-MCNC: 12.5 G/DL (ref 14–18)
HGB BLD-MCNC: 13.8 G/DL (ref 14–18)
HGB UR QL STRIP: NEGATIVE
IMM GRANULOCYTES # BLD AUTO: 0.04 K/UL (ref 0–0.04)
IMM GRANULOCYTES # BLD AUTO: 0.07 K/UL (ref 0–0.04)
IMM GRANULOCYTES NFR BLD AUTO: 0.6 % (ref 0–0.5)
INR PPP: 1.1 (ref 0.8–1.2)
KETONES UR QL STRIP: NEGATIVE
LEUKOCYTE ESTERASE UR QL STRIP: NEGATIVE
LYMPHOCYTES # BLD AUTO: 1.4 K/UL (ref 1–4.8)
LYMPHOCYTES # BLD AUTO: 1.7 K/UL (ref 1–4.8)
LYMPHOCYTES NFR BLD: 14.8 % (ref 18–48)
LYMPHOCYTES NFR BLD: 15 % (ref 18–48)
MAGNESIUM SERPL-MCNC: 1.5 MG/DL (ref 1.6–2.6)
MCH RBC QN AUTO: 30.8 PG (ref 27–31)
MCH RBC QN AUTO: 31.1 PG (ref 27–31)
MCHC RBC AUTO-ENTMCNC: 35 G/DL (ref 32–36)
MCHC RBC AUTO-ENTMCNC: 35.8 G/DL (ref 32–36)
MCV RBC AUTO: 87 FL (ref 82–98)
MCV RBC AUTO: 88 FL (ref 82–98)
MONOCYTES # BLD AUTO: 0.8 K/UL (ref 0.3–1)
MONOCYTES # BLD AUTO: 1.1 K/UL (ref 0.3–1)
MONOCYTES NFR BLD: 8.8 % (ref 4–15)
MONOCYTES NFR BLD: 9.2 % (ref 4–15)
NEUTROPHILS # BLD AUTO: 7 K/UL (ref 1.8–7.7)
NEUTROPHILS # BLD AUTO: 8.5 K/UL (ref 1.8–7.7)
NEUTROPHILS NFR BLD: 72.9 % (ref 38–73)
NEUTROPHILS NFR BLD: 74.6 % (ref 38–73)
NITRITE UR QL STRIP: NEGATIVE
NRBC BLD-RTO: 0 /100 WBC
NRBC BLD-RTO: 0 /100 WBC
PH UR STRIP: 6 [PH] (ref 5–8)
PHOSPHATE SERPL-MCNC: 3.6 MG/DL (ref 2.7–4.5)
PLATELET # BLD AUTO: 146 K/UL (ref 150–350)
PLATELET # BLD AUTO: 152 K/UL (ref 150–350)
PMV BLD AUTO: 9.6 FL (ref 9.2–12.9)
PMV BLD AUTO: 9.8 FL (ref 9.2–12.9)
POTASSIUM SERPL-SCNC: 3.5 MMOL/L (ref 3.5–5.1)
POTASSIUM SERPL-SCNC: 3.8 MMOL/L (ref 3.5–5.1)
PROT SERPL-MCNC: 6.3 G/DL (ref 6–8.4)
PROT UR QL STRIP: NEGATIVE
PROTHROMBIN TIME: 11.3 SEC (ref 9–12.5)
RBC # BLD AUTO: 4.06 M/UL (ref 4.6–6.2)
RBC # BLD AUTO: 4.44 M/UL (ref 4.6–6.2)
SODIUM SERPL-SCNC: 139 MMOL/L (ref 136–145)
SODIUM SERPL-SCNC: 142 MMOL/L (ref 136–145)
SP GR UR STRIP: 1.02 (ref 1–1.03)
TSH SERPL DL<=0.005 MIU/L-ACNC: 1.48 UIU/ML (ref 0.4–4)
URN SPEC COLLECT METH UR: NORMAL
UROBILINOGEN UR STRIP-ACNC: NEGATIVE EU/DL
WBC # BLD AUTO: 11.7 K/UL (ref 3.9–12.7)
WBC # BLD AUTO: 9.37 K/UL (ref 3.9–12.7)

## 2019-12-22 PROCEDURE — C1713 ANCHOR/SCREW BN/BN,TIS/BN: HCPCS | Performed by: ORTHOPAEDIC SURGERY

## 2019-12-22 PROCEDURE — 84100 ASSAY OF PHOSPHORUS: CPT

## 2019-12-22 PROCEDURE — 25000003 PHARM REV CODE 250: Performed by: ORTHOPAEDIC SURGERY

## 2019-12-22 PROCEDURE — 99900035 HC TECH TIME PER 15 MIN (STAT)

## 2019-12-22 PROCEDURE — C1729 CATH, DRAINAGE: HCPCS | Performed by: ORTHOPAEDIC SURGERY

## 2019-12-22 PROCEDURE — 85025 COMPLETE CBC W/AUTO DIFF WBC: CPT | Mod: 91

## 2019-12-22 PROCEDURE — 25000003 PHARM REV CODE 250: Performed by: PHYSICIAN ASSISTANT

## 2019-12-22 PROCEDURE — 27200651 HC AIRWAY, LMA: Performed by: ANESTHESIOLOGY

## 2019-12-22 PROCEDURE — 63600175 PHARM REV CODE 636 W HCPCS: Performed by: PHYSICIAN ASSISTANT

## 2019-12-22 PROCEDURE — 63600175 PHARM REV CODE 636 W HCPCS: Performed by: ANESTHESIOLOGY

## 2019-12-22 PROCEDURE — 25000003 PHARM REV CODE 250: Performed by: ANESTHESIOLOGY

## 2019-12-22 PROCEDURE — D9220A PRA ANESTHESIA: ICD-10-PCS | Mod: ANES,,, | Performed by: ANESTHESIOLOGY

## 2019-12-22 PROCEDURE — 63600175 PHARM REV CODE 636 W HCPCS: Performed by: NURSE ANESTHETIST, CERTIFIED REGISTERED

## 2019-12-22 PROCEDURE — 27201423 OPTIME MED/SURG SUP & DEVICES STERILE SUPPLY: Performed by: ORTHOPAEDIC SURGERY

## 2019-12-22 PROCEDURE — 85730 THROMBOPLASTIN TIME PARTIAL: CPT

## 2019-12-22 PROCEDURE — 63600175 PHARM REV CODE 636 W HCPCS: Performed by: ORTHOPAEDIC SURGERY

## 2019-12-22 PROCEDURE — D9220A PRA ANESTHESIA: Mod: CRNA,,, | Performed by: NURSE ANESTHETIST, CERTIFIED REGISTERED

## 2019-12-22 PROCEDURE — 80048 BASIC METABOLIC PNL TOTAL CA: CPT

## 2019-12-22 PROCEDURE — 36000711: Performed by: ORTHOPAEDIC SURGERY

## 2019-12-22 PROCEDURE — 25000003 PHARM REV CODE 250: Performed by: SPECIALIST

## 2019-12-22 PROCEDURE — 25000003 PHARM REV CODE 250: Performed by: NURSE ANESTHETIST, CERTIFIED REGISTERED

## 2019-12-22 PROCEDURE — 94761 N-INVAS EAR/PLS OXIMETRY MLT: CPT

## 2019-12-22 PROCEDURE — 86850 RBC ANTIBODY SCREEN: CPT

## 2019-12-22 PROCEDURE — S5010 5% DEXTROSE AND 0.45% SALINE: HCPCS | Performed by: ORTHOPAEDIC SURGERY

## 2019-12-22 PROCEDURE — 71000039 HC RECOVERY, EACH ADD'L HOUR: Performed by: ORTHOPAEDIC SURGERY

## 2019-12-22 PROCEDURE — 25000003 PHARM REV CODE 250

## 2019-12-22 PROCEDURE — 36415 COLL VENOUS BLD VENIPUNCTURE: CPT

## 2019-12-22 PROCEDURE — 36000710: Performed by: ORTHOPAEDIC SURGERY

## 2019-12-22 PROCEDURE — 83735 ASSAY OF MAGNESIUM: CPT

## 2019-12-22 PROCEDURE — D9220A PRA ANESTHESIA: Mod: ANES,,, | Performed by: ANESTHESIOLOGY

## 2019-12-22 PROCEDURE — D9220A PRA ANESTHESIA: ICD-10-PCS | Mod: CRNA,,, | Performed by: NURSE ANESTHETIST, CERTIFIED REGISTERED

## 2019-12-22 PROCEDURE — 81003 URINALYSIS AUTO W/O SCOPE: CPT

## 2019-12-22 PROCEDURE — 27000221 HC OXYGEN, UP TO 24 HOURS

## 2019-12-22 PROCEDURE — 12000002 HC ACUTE/MED SURGE SEMI-PRIVATE ROOM

## 2019-12-22 PROCEDURE — 85025 COMPLETE CBC W/AUTO DIFF WBC: CPT

## 2019-12-22 PROCEDURE — 85610 PROTHROMBIN TIME: CPT

## 2019-12-22 PROCEDURE — 80053 COMPREHEN METABOLIC PANEL: CPT

## 2019-12-22 PROCEDURE — 37000008 HC ANESTHESIA 1ST 15 MINUTES: Performed by: ORTHOPAEDIC SURGERY

## 2019-12-22 PROCEDURE — 71000033 HC RECOVERY, INTIAL HOUR: Performed by: ORTHOPAEDIC SURGERY

## 2019-12-22 PROCEDURE — 83036 HEMOGLOBIN GLYCOSYLATED A1C: CPT

## 2019-12-22 PROCEDURE — 63600175 PHARM REV CODE 636 W HCPCS: Performed by: EMERGENCY MEDICINE

## 2019-12-22 PROCEDURE — 37000009 HC ANESTHESIA EA ADD 15 MINS: Performed by: ORTHOPAEDIC SURGERY

## 2019-12-22 PROCEDURE — 25000242 PHARM REV CODE 250 ALT 637 W/ HCPCS: Performed by: PHYSICIAN ASSISTANT

## 2019-12-22 PROCEDURE — 84443 ASSAY THYROID STIM HORMONE: CPT

## 2019-12-22 DEVICE — SCREW CORTEX 4.5 32M: Type: IMPLANTABLE DEVICE | Site: HIP | Status: FUNCTIONAL

## 2019-12-22 DEVICE — PLATE 135 MM 4 HOLE: Type: IMPLANTABLE DEVICE | Site: HIP | Status: FUNCTIONAL

## 2019-12-22 DEVICE — SCREW CORTEX 4.5 40M: Type: IMPLANTABLE DEVICE | Site: HIP | Status: FUNCTIONAL

## 2019-12-22 DEVICE — IMPLANTABLE DEVICE: Type: IMPLANTABLE DEVICE | Site: HIP | Status: FUNCTIONAL

## 2019-12-22 DEVICE — SCREW CORTEX 4.5 34M: Type: IMPLANTABLE DEVICE | Site: HIP | Status: FUNCTIONAL

## 2019-12-22 DEVICE — SCREW 36MM LONG ST: Type: IMPLANTABLE DEVICE | Site: HIP | Status: FUNCTIONAL

## 2019-12-22 RX ORDER — IBUPROFEN 200 MG
24 TABLET ORAL
Status: DISCONTINUED | OUTPATIENT
Start: 2019-12-22 | End: 2019-12-25 | Stop reason: HOSPADM

## 2019-12-22 RX ORDER — OXYCODONE HYDROCHLORIDE 5 MG/1
5 TABLET ORAL ONCE AS NEEDED
Status: DISCONTINUED | OUTPATIENT
Start: 2019-12-22 | End: 2019-12-22

## 2019-12-22 RX ORDER — ACETAMINOPHEN 325 MG/1
650 TABLET ORAL EVERY 4 HOURS PRN
Status: DISCONTINUED | OUTPATIENT
Start: 2019-12-22 | End: 2019-12-25 | Stop reason: HOSPADM

## 2019-12-22 RX ORDER — FENTANYL CITRATE 50 UG/ML
INJECTION, SOLUTION INTRAMUSCULAR; INTRAVENOUS
Status: DISCONTINUED | OUTPATIENT
Start: 2019-12-22 | End: 2019-12-22

## 2019-12-22 RX ORDER — METOPROLOL TARTRATE 50 MG/1
100 TABLET ORAL ONCE
Status: COMPLETED | OUTPATIENT
Start: 2019-12-22 | End: 2019-12-22

## 2019-12-22 RX ORDER — CYCLOBENZAPRINE HCL 10 MG
10 TABLET ORAL 3 TIMES DAILY PRN
Status: DISCONTINUED | OUTPATIENT
Start: 2019-12-22 | End: 2019-12-25 | Stop reason: HOSPADM

## 2019-12-22 RX ORDER — CEFAZOLIN SODIUM 2 G/50ML
2 SOLUTION INTRAVENOUS
Status: COMPLETED | OUTPATIENT
Start: 2019-12-23 | End: 2019-12-23

## 2019-12-22 RX ORDER — TALC
6 POWDER (GRAM) TOPICAL NIGHTLY PRN
Status: DISCONTINUED | OUTPATIENT
Start: 2019-12-22 | End: 2019-12-25 | Stop reason: HOSPADM

## 2019-12-22 RX ORDER — ONDANSETRON 2 MG/ML
4 INJECTION INTRAMUSCULAR; INTRAVENOUS EVERY 12 HOURS PRN
Status: DISCONTINUED | OUTPATIENT
Start: 2019-12-22 | End: 2019-12-25 | Stop reason: HOSPADM

## 2019-12-22 RX ORDER — ONDANSETRON 2 MG/ML
8 INJECTION INTRAMUSCULAR; INTRAVENOUS EVERY 8 HOURS PRN
Status: DISCONTINUED | OUTPATIENT
Start: 2019-12-22 | End: 2019-12-25 | Stop reason: HOSPADM

## 2019-12-22 RX ORDER — LANOLIN ALCOHOL/MO/W.PET/CERES
800 CREAM (GRAM) TOPICAL
Status: DISCONTINUED | OUTPATIENT
Start: 2019-12-22 | End: 2019-12-25 | Stop reason: HOSPADM

## 2019-12-22 RX ORDER — METOPROLOL TARTRATE 1 MG/ML
5 INJECTION, SOLUTION INTRAVENOUS ONCE
Status: COMPLETED | OUTPATIENT
Start: 2019-12-22 | End: 2019-12-22

## 2019-12-22 RX ORDER — AMLODIPINE BESYLATE 5 MG/1
5 TABLET ORAL DAILY
Status: DISCONTINUED | OUTPATIENT
Start: 2019-12-22 | End: 2019-12-25 | Stop reason: HOSPADM

## 2019-12-22 RX ORDER — BISACODYL 10 MG
10 SUPPOSITORY, RECTAL RECTAL DAILY PRN
Status: DISCONTINUED | OUTPATIENT
Start: 2019-12-22 | End: 2019-12-25 | Stop reason: HOSPADM

## 2019-12-22 RX ORDER — SODIUM CHLORIDE, SODIUM LACTATE, POTASSIUM CHLORIDE, CALCIUM CHLORIDE 600; 310; 30; 20 MG/100ML; MG/100ML; MG/100ML; MG/100ML
125 INJECTION, SOLUTION INTRAVENOUS CONTINUOUS
Status: DISCONTINUED | OUTPATIENT
Start: 2019-12-22 | End: 2019-12-22

## 2019-12-22 RX ORDER — LOPERAMIDE HYDROCHLORIDE 2 MG/1
2 CAPSULE ORAL CONTINUOUS PRN
Status: DISCONTINUED | OUTPATIENT
Start: 2019-12-22 | End: 2019-12-25 | Stop reason: HOSPADM

## 2019-12-22 RX ORDER — HYDROCODONE BITARTRATE AND ACETAMINOPHEN 5; 325 MG/1; MG/1
1 TABLET ORAL EVERY 6 HOURS PRN
Status: DISCONTINUED | OUTPATIENT
Start: 2019-12-22 | End: 2019-12-22 | Stop reason: SDUPTHER

## 2019-12-22 RX ORDER — ZOLPIDEM TARTRATE 5 MG/1
5 TABLET ORAL NIGHTLY PRN
Status: DISCONTINUED | OUTPATIENT
Start: 2019-12-22 | End: 2019-12-25 | Stop reason: HOSPADM

## 2019-12-22 RX ORDER — IBUPROFEN 200 MG
16 TABLET ORAL
Status: DISCONTINUED | OUTPATIENT
Start: 2019-12-22 | End: 2019-12-25 | Stop reason: HOSPADM

## 2019-12-22 RX ORDER — HYDROMORPHONE HYDROCHLORIDE 2 MG/ML
1 INJECTION, SOLUTION INTRAMUSCULAR; INTRAVENOUS; SUBCUTANEOUS
Status: DISCONTINUED | OUTPATIENT
Start: 2019-12-22 | End: 2019-12-22

## 2019-12-22 RX ORDER — LABETALOL HYDROCHLORIDE 5 MG/ML
10 INJECTION, SOLUTION INTRAVENOUS ONCE
Status: DISCONTINUED | OUTPATIENT
Start: 2019-12-22 | End: 2019-12-22

## 2019-12-22 RX ORDER — POTASSIUM CHLORIDE 20 MEQ/15ML
60 SOLUTION ORAL
Status: DISCONTINUED | OUTPATIENT
Start: 2019-12-22 | End: 2019-12-25 | Stop reason: HOSPADM

## 2019-12-22 RX ORDER — SODIUM CHLORIDE 9 MG/ML
INJECTION, SOLUTION INTRAVENOUS CONTINUOUS
Status: DISCONTINUED | OUTPATIENT
Start: 2019-12-22 | End: 2019-12-23

## 2019-12-22 RX ORDER — FAMOTIDINE 20 MG/1
20 TABLET, FILM COATED ORAL 2 TIMES DAILY
Status: DISCONTINUED | OUTPATIENT
Start: 2019-12-22 | End: 2019-12-23

## 2019-12-22 RX ORDER — ONDANSETRON 2 MG/ML
4 INJECTION INTRAMUSCULAR; INTRAVENOUS ONCE AS NEEDED
Status: DISCONTINUED | OUTPATIENT
Start: 2019-12-22 | End: 2019-12-22

## 2019-12-22 RX ORDER — METOPROLOL TARTRATE 50 MG/1
100 TABLET ORAL 2 TIMES DAILY
Status: DISCONTINUED | OUTPATIENT
Start: 2019-12-23 | End: 2019-12-25 | Stop reason: HOSPADM

## 2019-12-22 RX ORDER — MORPHINE SULFATE 4 MG/ML
4 INJECTION, SOLUTION INTRAMUSCULAR; INTRAVENOUS EVERY 4 HOURS PRN
Status: DISCONTINUED | OUTPATIENT
Start: 2019-12-22 | End: 2019-12-22

## 2019-12-22 RX ORDER — POTASSIUM CHLORIDE 20 MEQ/1
20 TABLET, EXTENDED RELEASE ORAL ONCE
Status: DISCONTINUED | OUTPATIENT
Start: 2019-12-22 | End: 2019-12-25 | Stop reason: HOSPADM

## 2019-12-22 RX ORDER — LIDOCAINE HCL/PF 100 MG/5ML
SYRINGE (ML) INTRAVENOUS
Status: DISCONTINUED | OUTPATIENT
Start: 2019-12-22 | End: 2019-12-22

## 2019-12-22 RX ORDER — HYDROCODONE BITARTRATE AND ACETAMINOPHEN 5; 325 MG/1; MG/1
1 TABLET ORAL EVERY 4 HOURS PRN
Status: DISCONTINUED | OUTPATIENT
Start: 2019-12-22 | End: 2019-12-25 | Stop reason: HOSPADM

## 2019-12-22 RX ORDER — SODIUM CHLORIDE 0.9 % (FLUSH) 0.9 %
10 SYRINGE (ML) INJECTION
Status: DISCONTINUED | OUTPATIENT
Start: 2019-12-22 | End: 2019-12-25 | Stop reason: HOSPADM

## 2019-12-22 RX ORDER — MUPIROCIN 20 MG/G
1 OINTMENT TOPICAL 2 TIMES DAILY
Status: DISCONTINUED | OUTPATIENT
Start: 2019-12-22 | End: 2019-12-25 | Stop reason: HOSPADM

## 2019-12-22 RX ORDER — MIDAZOLAM HYDROCHLORIDE 1 MG/ML
INJECTION, SOLUTION INTRAMUSCULAR; INTRAVENOUS
Status: DISCONTINUED | OUTPATIENT
Start: 2019-12-22 | End: 2019-12-22

## 2019-12-22 RX ORDER — AMOXICILLIN 250 MG
1 CAPSULE ORAL 2 TIMES DAILY
Status: DISCONTINUED | OUTPATIENT
Start: 2019-12-22 | End: 2019-12-25 | Stop reason: HOSPADM

## 2019-12-22 RX ORDER — PROPOFOL 10 MG/ML
VIAL (ML) INTRAVENOUS
Status: DISCONTINUED | OUTPATIENT
Start: 2019-12-22 | End: 2019-12-22

## 2019-12-22 RX ORDER — ONDANSETRON 2 MG/ML
INJECTION INTRAMUSCULAR; INTRAVENOUS
Status: DISCONTINUED | OUTPATIENT
Start: 2019-12-22 | End: 2019-12-22

## 2019-12-22 RX ORDER — CEFAZOLIN SODIUM 1 G/3ML
INJECTION, POWDER, FOR SOLUTION INTRAMUSCULAR; INTRAVENOUS
Status: DISCONTINUED | OUTPATIENT
Start: 2019-12-22 | End: 2019-12-22

## 2019-12-22 RX ORDER — FLUTICASONE PROPIONATE 50 MCG
2 SPRAY, SUSPENSION (ML) NASAL DAILY
Status: DISCONTINUED | OUTPATIENT
Start: 2019-12-22 | End: 2019-12-25 | Stop reason: HOSPADM

## 2019-12-22 RX ORDER — CELECOXIB 100 MG/1
200 CAPSULE ORAL 2 TIMES DAILY
Status: DISCONTINUED | OUTPATIENT
Start: 2019-12-22 | End: 2019-12-25 | Stop reason: HOSPADM

## 2019-12-22 RX ORDER — ENOXAPARIN SODIUM 100 MG/ML
40 INJECTION SUBCUTANEOUS EVERY 24 HOURS
Status: DISCONTINUED | OUTPATIENT
Start: 2019-12-23 | End: 2019-12-23

## 2019-12-22 RX ORDER — DEXTROSE MONOHYDRATE AND SODIUM CHLORIDE 5; .45 G/100ML; G/100ML
INJECTION, SOLUTION INTRAVENOUS CONTINUOUS
Status: DISCONTINUED | OUTPATIENT
Start: 2019-12-22 | End: 2019-12-23

## 2019-12-22 RX ORDER — MORPHINE SULFATE 10 MG/ML
4 INJECTION INTRAMUSCULAR; INTRAVENOUS; SUBCUTANEOUS EVERY 4 HOURS PRN
Status: DISCONTINUED | OUTPATIENT
Start: 2019-12-22 | End: 2019-12-22 | Stop reason: SDUPTHER

## 2019-12-22 RX ORDER — OXYCODONE HYDROCHLORIDE 10 MG/1
10 TABLET ORAL EVERY 4 HOURS PRN
Status: DISCONTINUED | OUTPATIENT
Start: 2019-12-22 | End: 2019-12-25 | Stop reason: HOSPADM

## 2019-12-22 RX ORDER — HYDROMORPHONE HYDROCHLORIDE 2 MG/ML
0.5 INJECTION, SOLUTION INTRAMUSCULAR; INTRAVENOUS; SUBCUTANEOUS EVERY 6 HOURS PRN
Status: DISCONTINUED | OUTPATIENT
Start: 2019-12-22 | End: 2019-12-22 | Stop reason: SDUPTHER

## 2019-12-22 RX ORDER — HYDROMORPHONE HYDROCHLORIDE 2 MG/ML
1 INJECTION, SOLUTION INTRAMUSCULAR; INTRAVENOUS; SUBCUTANEOUS EVERY 4 HOURS PRN
Status: DISCONTINUED | OUTPATIENT
Start: 2019-12-22 | End: 2019-12-25 | Stop reason: HOSPADM

## 2019-12-22 RX ORDER — DEXAMETHASONE SODIUM PHOSPHATE 4 MG/ML
INJECTION, SOLUTION INTRA-ARTICULAR; INTRALESIONAL; INTRAMUSCULAR; INTRAVENOUS; SOFT TISSUE
Status: DISCONTINUED | OUTPATIENT
Start: 2019-12-22 | End: 2019-12-22

## 2019-12-22 RX ORDER — POTASSIUM CHLORIDE 20 MEQ/15ML
40 SOLUTION ORAL
Status: DISCONTINUED | OUTPATIENT
Start: 2019-12-22 | End: 2019-12-25 | Stop reason: HOSPADM

## 2019-12-22 RX ORDER — HYDROMORPHONE HYDROCHLORIDE 2 MG/ML
1 INJECTION, SOLUTION INTRAMUSCULAR; INTRAVENOUS; SUBCUTANEOUS
Status: COMPLETED | OUTPATIENT
Start: 2019-12-22 | End: 2019-12-22

## 2019-12-22 RX ORDER — ACETAMINOPHEN 10 MG/ML
INJECTION, SOLUTION INTRAVENOUS
Status: DISCONTINUED | OUTPATIENT
Start: 2019-12-22 | End: 2019-12-22

## 2019-12-22 RX ORDER — OXYMETAZOLINE HCL 0.05 %
SPRAY, NON-AEROSOL (ML) NASAL
Status: COMPLETED
Start: 2019-12-22 | End: 2019-12-22

## 2019-12-22 RX ORDER — LABETALOL HYDROCHLORIDE 5 MG/ML
10 INJECTION, SOLUTION INTRAVENOUS ONCE
Status: COMPLETED | OUTPATIENT
Start: 2019-12-22 | End: 2019-12-22

## 2019-12-22 RX ORDER — ATORVASTATIN CALCIUM 20 MG/1
20 TABLET, FILM COATED ORAL NIGHTLY
Status: DISCONTINUED | OUTPATIENT
Start: 2019-12-22 | End: 2019-12-25 | Stop reason: HOSPADM

## 2019-12-22 RX ORDER — OXYMETAZOLINE HCL 0.05 %
1 SPRAY, NON-AEROSOL (ML) NASAL
Status: COMPLETED | OUTPATIENT
Start: 2019-12-22 | End: 2019-12-22

## 2019-12-22 RX ORDER — HYDROMORPHONE HYDROCHLORIDE 2 MG/ML
0.2 INJECTION, SOLUTION INTRAMUSCULAR; INTRAVENOUS; SUBCUTANEOUS EVERY 5 MIN PRN
Status: COMPLETED | OUTPATIENT
Start: 2019-12-22 | End: 2019-12-22

## 2019-12-22 RX ORDER — GLUCAGON 1 MG
1 KIT INJECTION
Status: DISCONTINUED | OUTPATIENT
Start: 2019-12-22 | End: 2019-12-25 | Stop reason: HOSPADM

## 2019-12-22 RX ORDER — HYDROMORPHONE HYDROCHLORIDE 2 MG/ML
2 INJECTION, SOLUTION INTRAMUSCULAR; INTRAVENOUS; SUBCUTANEOUS
Status: DISCONTINUED | OUTPATIENT
Start: 2019-12-22 | End: 2019-12-22 | Stop reason: SDUPTHER

## 2019-12-22 RX ORDER — PHENYLEPHRINE HYDROCHLORIDE 10 MG/ML
INJECTION INTRAVENOUS
Status: DISCONTINUED | OUTPATIENT
Start: 2019-12-22 | End: 2019-12-22

## 2019-12-22 RX ORDER — VANCOMYCIN HCL IN 5 % DEXTROSE 1G/250ML
1000 PLASTIC BAG, INJECTION (ML) INTRAVENOUS
Status: DISCONTINUED | OUTPATIENT
Start: 2019-12-22 | End: 2019-12-22

## 2019-12-22 RX ORDER — HYDROMORPHONE HYDROCHLORIDE 2 MG/ML
1 INJECTION, SOLUTION INTRAMUSCULAR; INTRAVENOUS; SUBCUTANEOUS
Status: DISCONTINUED | OUTPATIENT
Start: 2019-12-22 | End: 2019-12-25 | Stop reason: HOSPADM

## 2019-12-22 RX ORDER — PANTOPRAZOLE SODIUM 40 MG/1
40 TABLET, DELAYED RELEASE ORAL DAILY
Status: DISCONTINUED | OUTPATIENT
Start: 2019-12-22 | End: 2019-12-22 | Stop reason: SDUPTHER

## 2019-12-22 RX ORDER — HYDROMORPHONE HYDROCHLORIDE 2 MG/ML
2 INJECTION, SOLUTION INTRAMUSCULAR; INTRAVENOUS; SUBCUTANEOUS
Status: DISCONTINUED | OUTPATIENT
Start: 2019-12-22 | End: 2019-12-22

## 2019-12-22 RX ADMIN — PHENYLEPHRINE HYDROCHLORIDE 200 MCG: 10 INJECTION INTRAVENOUS at 05:12

## 2019-12-22 RX ADMIN — HYDROMORPHONE HYDROCHLORIDE 0.2 MG: 2 INJECTION, SOLUTION INTRAMUSCULAR; INTRAVENOUS; SUBCUTANEOUS at 06:12

## 2019-12-22 RX ADMIN — ACETAMINOPHEN 1000 MG: 10 INJECTION, SOLUTION INTRAVENOUS at 05:12

## 2019-12-22 RX ADMIN — SENNOSIDES AND DOCUSATE SODIUM 1 TABLET: 8.6; 5 TABLET ORAL at 04:12

## 2019-12-22 RX ADMIN — METOPROLOL TARTRATE 5 MG: 5 INJECTION, SOLUTION INTRAVENOUS at 10:12

## 2019-12-22 RX ADMIN — CEFAZOLIN 2 G: 1 INJECTION, POWDER, FOR SOLUTION INTRAVENOUS at 04:12

## 2019-12-22 RX ADMIN — MIDAZOLAM 2 MG: 1 INJECTION INTRAMUSCULAR; INTRAVENOUS at 04:12

## 2019-12-22 RX ADMIN — HYDROMORPHONE HYDROCHLORIDE 2 MG: 2 INJECTION, SOLUTION INTRAMUSCULAR; INTRAVENOUS; SUBCUTANEOUS at 11:12

## 2019-12-22 RX ADMIN — PROPOFOL 200 MG: 10 INJECTION, EMULSION INTRAVENOUS at 04:12

## 2019-12-22 RX ADMIN — FENTANYL CITRATE 50 MCG: 50 INJECTION, SOLUTION INTRAMUSCULAR; INTRAVENOUS at 04:12

## 2019-12-22 RX ADMIN — AMLODIPINE BESYLATE 5 MG: 5 TABLET ORAL at 03:12

## 2019-12-22 RX ADMIN — ATORVASTATIN CALCIUM 20 MG: 20 TABLET, FILM COATED ORAL at 10:12

## 2019-12-22 RX ADMIN — FLUTICASONE PROPIONATE 100 MCG: 50 SPRAY, METERED NASAL at 08:12

## 2019-12-22 RX ADMIN — DEXAMETHASONE SODIUM PHOSPHATE 8 MG: 4 INJECTION, SOLUTION INTRAMUSCULAR; INTRAVENOUS at 04:12

## 2019-12-22 RX ADMIN — LABETALOL HYDROCHLORIDE 10 MG: 5 INJECTION, SOLUTION INTRAVENOUS at 06:12

## 2019-12-22 RX ADMIN — FENTANYL CITRATE 50 MCG: 50 INJECTION, SOLUTION INTRAMUSCULAR; INTRAVENOUS at 05:12

## 2019-12-22 RX ADMIN — HYDROMORPHONE HYDROCHLORIDE 0.5 MG: 2 INJECTION, SOLUTION INTRAMUSCULAR; INTRAVENOUS; SUBCUTANEOUS at 06:12

## 2019-12-22 RX ADMIN — SODIUM CHLORIDE, SODIUM GLUCONATE, SODIUM ACETATE, POTASSIUM CHLORIDE, MAGNESIUM CHLORIDE, SODIUM PHOSPHATE, DIBASIC, AND POTASSIUM PHOSPHATE: .53; .5; .37; .037; .03; .012; .00082 INJECTION, SOLUTION INTRAVENOUS at 04:12

## 2019-12-22 RX ADMIN — ONDANSETRON 4 MG: 2 INJECTION, SOLUTION INTRAMUSCULAR; INTRAVENOUS at 04:12

## 2019-12-22 RX ADMIN — SENNOSIDES AND DOCUSATE SODIUM 1 TABLET: 8.6; 5 TABLET ORAL at 10:12

## 2019-12-22 RX ADMIN — HYDROMORPHONE HYDROCHLORIDE 1 MG: 2 INJECTION, SOLUTION INTRAMUSCULAR; INTRAVENOUS; SUBCUTANEOUS at 01:12

## 2019-12-22 RX ADMIN — PANTOPRAZOLE SODIUM 40 MG: 40 TABLET, DELAYED RELEASE ORAL at 08:12

## 2019-12-22 RX ADMIN — MUPIROCIN 1 G: 20 OINTMENT TOPICAL at 10:12

## 2019-12-22 RX ADMIN — Medication 1 SPRAY: at 12:12

## 2019-12-22 RX ADMIN — METOPROLOL TARTRATE 100 MG: 50 TABLET ORAL at 08:12

## 2019-12-22 RX ADMIN — ATORVASTATIN CALCIUM 20 MG: 20 TABLET, FILM COATED ORAL at 04:12

## 2019-12-22 RX ADMIN — SODIUM CHLORIDE 75 ML/HR: 0.9 INJECTION, SOLUTION INTRAVENOUS at 06:12

## 2019-12-22 RX ADMIN — OXYMETAZOLINE HCL 1 SPRAY: 0.05 SPRAY NASAL at 12:12

## 2019-12-22 RX ADMIN — CYCLOBENZAPRINE HYDROCHLORIDE 10 MG: 10 TABLET, FILM COATED ORAL at 04:12

## 2019-12-22 RX ADMIN — LIDOCAINE HYDROCHLORIDE 100 MG: 20 INJECTION, SOLUTION INTRAVENOUS at 04:12

## 2019-12-22 RX ADMIN — DEXTROSE AND SODIUM CHLORIDE: 5; .45 INJECTION, SOLUTION INTRAVENOUS at 07:12

## 2019-12-22 RX ADMIN — FAMOTIDINE 20 MG: 20 TABLET ORAL at 10:12

## 2019-12-22 RX ADMIN — SODIUM CHLORIDE 1000 ML: 0.9 INJECTION, SOLUTION INTRAVENOUS at 12:12

## 2019-12-22 RX ADMIN — CYCLOBENZAPRINE HYDROCHLORIDE 10 MG: 10 TABLET, FILM COATED ORAL at 10:12

## 2019-12-22 RX ADMIN — CELECOXIB 200 MG: 100 CAPSULE ORAL at 10:12

## 2019-12-22 NOTE — ASSESSMENT & PLAN NOTE
Reviewed EKG from [place] showing atrial fibrillation w/o RVR, rate in the 80s.  Obtain EKG and ECHO.  Consult cardiology.  [id number] Score: 1  Will defer anticoagulation to cardiology.

## 2019-12-22 NOTE — ED PROVIDER NOTES
Encounter Date: 2019       History     Chief Complaint   Patient presents with    Leg Injury     Well-developed 59-year-old male was standing at the top of stairs when he slipped and fell.  Landed on his buttocks and right leg.  Complaint of extreme pain in the right leg.  Some obvious deformity noted.  EMS called.  Transferred to the hospital.  10 mg morphine IV for pain control no loss of conscious.  No other complaints of any other injuries.  Arrives with his leg in a Amanuel hair traction splint.        Review of patient's allergies indicates:  No Known Allergies  Past Medical History:   Diagnosis Date    GERD (gastroesophageal reflux disease)     Hyperlipidemia     Hypertension     Kidney stone      Past Surgical History:   Procedure Laterality Date    KNEE SURGERY      ROTATOR CUFF REPAIR      SINUS SURGERY       Family History   Problem Relation Age of Onset    Hepatitis Mother     Heart disease Father      Social History     Tobacco Use    Smoking status: Former Smoker     Packs/day: 0.50     Types: Cigarettes     Start date: 1980     Last attempt to quit: 2018     Years since quittin.3    Smokeless tobacco: Never Used    Tobacco comment: Counselled   Substance Use Topics    Alcohol use: No    Drug use: No     Review of Systems   Constitutional: Negative.    HENT: Negative.    Respiratory: Negative.    Cardiovascular: Negative.    Gastrointestinal: Negative.    Musculoskeletal:        Obvious injury of the right femur PTA   All other systems reviewed and are negative.      Physical Exam     Initial Vitals [19 2231]   BP Pulse Resp Temp SpO2   (!) 130/103 81 15 98 °F (36.7 °C) 97 %      MAP       --         Physical Exam    Nursing note and vitals reviewed.  Constitutional: He appears well-developed and well-nourished.   HENT:   Head: Normocephalic and atraumatic.   Eyes: EOM are normal. Pupils are equal, round, and reactive to light.   Neck: Normal range of motion. Neck  supple.   Cardiovascular: Normal rate.   Irregularly irregular rhythm   Pulmonary/Chest: Breath sounds normal.   Abdominal: Soft. Bowel sounds are normal.   Musculoskeletal: Normal range of motion.   Neurological: He is alert and oriented to person, place, and time. He has normal strength. GCS score is 15. GCS eye subscore is 4. GCS verbal subscore is 5. GCS motor subscore is 6.   Skin: Skin is warm. Capillary refill takes less than 2 seconds.   Psychiatric: He has a normal mood and affect. His behavior is normal. Thought content normal.         ED Course   Procedures  Labs Reviewed - No data to display  EKG Readings: (Independently Interpreted)   Rate 86, atrial fibrillation.  Left axis deviation, right bundle branch block, abnormal EKG.       Imaging Results    None          Medical Decision Making:   Differential Diagnosis:   Lumbar fracture, compression fracture, head injury, cervical injury, intra-abdominal injury, skin injury, contusion, ecchymosis, hematoma, dislocation, sprain.    ED Management:  I spoke with Dr. Fuller, hospitalist.  I will admit this patient to his service and transfer the patient to [place] for surgical evaluation and treatment.  I also discussed with the hospitalist the patient has new onset AFib.  No medications for this at this time.  He is rate controlled.                                 Clinical Impression:       ICD-10-CM ICD-9-CM   1. Closed right hip fracture, initial encounter S72.001A 820.8   2. Preop examination Z01.818 V72.84   3. Fall W19.XXXA E888.9   4. Irregular heartbeat I49.9 427.9   5. New onset a-fib I48.91 427.31         Disposition:   Disposition: Transferred  Condition: Fair                     Santino Rivas MD  12/22/19 0030       Santino Rivas MD  12/22/19 0226

## 2019-12-22 NOTE — CONSULTS
[facility]  Cardiology  Consult Note    Patient Name: Howard Segura  MRN: [id number]  Admission Date: 12/22/2019  Hospital Length of Stay: 0 days  Code Status: Full Code   Attending Provider: Lorna Rodriguez MD   Consulting Provider: Maciej Segal MD  Primary Care Physician: Bassam Lopez MD  Principal Problem:Closed fracture of neck of right femur    Patient information was obtained from patient, spouse/SO and ER records.     Consults  Subjective:     Chief Complaint:  A fib      HPI:pt fractured hip  And noted with a fib-  He's never had a fib that hes aware but has hx pac's for which he's on metoprolol  He has hi bp  No  Dm  nonsmaoker ( quit 2 years ago)   Denies CVD or chest pain       Past Medical History:   Diagnosis Date    GERD (gastroesophageal reflux disease)     Hyperlipidemia     Hypertension     Kidney stone        Past Surgical History:   Procedure Laterality Date    KNEE SURGERY      ROTATOR CUFF REPAIR      SINUS SURGERY         Review of patient's allergies indicates:  No Known Allergies    Current Facility-Administered Medications on File Prior to Encounter   Medication    [COMPLETED] 0.9%  NaCl infusion    [COMPLETED] hydromorphone (PF) injection 1 mg    [COMPLETED] hydromorphone (PF) injection 1 mg    [COMPLETED] ondansetron injection 4 mg    [COMPLETED] oxymetazoline 0.05 % nasal spray 1 spray     Current Outpatient Medications on File Prior to Encounter   Medication Sig    amlodipine-atorvastatin (CADUET) 5-20 mg per tablet Take 1 tablet by mouth once daily.    clotrimazole-betamethasone 1-0.05% (LOTRISONE) cream APPLY TOPICALLY TWICE DAILY (Patient taking differently: as needed. )    fluticasone (FLONASE) 50 mcg/actuation nasal spray 2 sprays (100 mcg total) by Each Nare route once daily.    metoprolol tartrate (LOPRESSOR) 100 MG tablet Take 1 tablet (100 mg total) by mouth 2 (two) times daily.    omeprazole (PRILOSEC OTC) 20 MG tablet Take 1 tablet by  mouth once daily.    potassium chloride SA (K-DUR,KLOR-CON) 10 MEQ tablet TAKE 1 TABLET(10 MEQ) BY MOUTH TWICE DAILY    triamterene-hydrochlorothiazide 37.5-25 mg (MAXZIDE-25) 37.5-25 mg per tablet Take 1 tablet by mouth once daily.     Family History     Problem Relation (Age of Onset)    Heart disease Father    Hepatitis Mother        Tobacco Use    Smoking status: Former Smoker     Packs/day: 0.50     Types: Cigarettes     Start date: 1980     Last attempt to quit: 2018     Years since quittin.4    Smokeless tobacco: Never Used    Tobacco comment: Counselled   Substance and Sexual Activity    Alcohol use: Yes     Comment: social    Drug use: No    Sexual activity: Yes     Partners: Female     Review of Systems   HENT: Positive for congestion.         Chronic sinus congestion    Cardiovascular: Negative.    Respiratory: Negative.          occassionalyy wakes night sob    Endocrine: Negative.    Skin: Negative.    Musculoskeletal: Positive for arthritis.   Neurological: Negative.    Psychiatric/Behavioral: Negative.      Objective:     Vital Signs (Most Recent):  Temp: 98.6 °F (37 °C) (19 1141)  Pulse: 92 (19 1141)  Resp: 18 (19 1141)  BP: (!) 131/91 (19 1141)  SpO2: 95 % (19 1141) Vital Signs (24h Range):  Temp:  [98 °F (36.7 °C)-98.6 °F (37 °C)] 98.6 °F (37 °C)  Pulse:  [] 92  Resp:  [10-21] 18  SpO2:  [94 %-98 %] 95 %  BP: (116-142)/() 131/91     Weight: 113.5 kg (250 lb 2.6 oz)  Body mass index is 33.93 kg/m².    SpO2: 95 %  O2 Device (Oxygen Therapy): room air    No intake or output data in the 24 hours ending 19 1247    Lines/Drains/Airways     Peripheral Intravenous Line                 Peripheral IV - Single Lumen 19 2258 20 G Right Hand less than 1 day         Peripheral IV - Single Lumen 19 0000 18 G Left Antecubital less than 1 day                Physical Exam 120/80      Neck no  Bruits    Lungs clear cor irreg irreg  No  m  abd ok legs good pulses    R hip deformed     Significant Labs:   CMP   Recent Labs   Lab 12/22/19  0005 12/22/19  0539    142   K 3.5 3.8    107   CO2 27 29   * 132*   BUN 21* 18   CREATININE 1.1 0.9   CALCIUM 8.4* 8.3*   PROT 6.3  --    ALBUMIN 4.2  --    BILITOT 0.9  --    ALKPHOS 52*  --    AST 35  --    ALT 41  --    ANIONGAP 9 6*   ESTGFRAFRICA >60.0 >60   EGFRNONAA >60.0 >60   , CBC   Recent Labs   Lab 12/22/19  0005 12/22/19  0540   WBC 11.70 9.37   HGB 13.8* 12.5*   HCT 38.6* 35.7*    146*    and Troponin No results for input(s): TROPONINI in the last 48 hours.    Significant Imaging: EKG: a fib    rbbb    Assessment and Plan:   1) a fib - age ?  + hx pac's  Probable sleep apnea   chads vasc score ( need for anticoagulation )= 1 ( lo)   2) hi bp    plan - cleared for surg,  Continue BB + bp  meds     Long term-  xarelto 10 mg post op x 3-4 days then 20 qd and rate control and cardiovert later   Active Diagnoses:    Diagnosis Date Noted POA    PRINCIPAL PROBLEM:  Closed fracture of neck of right femur [S72.001A] 12/22/2019 Yes    Paroxysmal atrial fibrillation [I48.0] 12/22/2019 Yes    GERD without esophagitis [K21.9] 10/30/2018 Yes    Former smoker [Z87.891] 05/08/2018 Not Applicable    Mixed dyslipidemia [E78.2] 05/08/2018 Yes    Benign essential hypertension [I10] 11/08/2017 Yes    Obesity with body mass index 30 or greater [E66.9] 11/08/2017 Yes      Problems Resolved During this Admission:     I personally reviewed chart and imaging studies ,examined patient, and discussed with the patient her illness and treatment including diet and follow-up care. This consult was prolonged and required approximately 50% time for review and 50% time examining patient and writing notes and orders     VTE Risk Mitigation (From admission, onward)         Ordered     IP VTE HIGH RISK PATIENT  Once      12/22/19 0311     Place sequential compression device  Until discontinued      12/22/19  0311     Reason for no Mechanical VTE Prophylaxis  Once     Question:  Reasons:  Answer:  Physician Provided (leave comment)  Comment:  pending surgery in the am    12/22/19 0311                Thank you for your consult.     Maciej Segal MD  Cardiology   [facility]

## 2019-12-22 NOTE — PLAN OF CARE
Patient AAO, VSS. Pain well managed with current pain management plan.  PRN medications utilized. IVF infusing as ordered. Purposeful hourly/q2hr rounding done during shift to promote patient safety. Patient free from falls and injury during shift. Will continue to monitor.

## 2019-12-22 NOTE — PLAN OF CARE
Pt is  AAOX4. POC reviewed with pt. Pt verbalized understanding. VSS. Remains afebrile. IVF infusing per order. Pain controlled with PRN medications. Pt having ORIF to right hip today. Remains free of injury. Bed low, breaks locked, call light in reach. Will monitor.

## 2019-12-22 NOTE — H&P
[facility] Hospital Medicine  History & Physical    Patient Name: Howard Segura  MRN: [id number]  Admission Date: 12/22/2019  Attending Physician: Lorna Rodriguez MD   Primary Care Provider: Bassam Lopez MD         Patient information was obtained from patient, spouse/SO, past medical records and ER records.     Subjective:     Principal Problem:Closed fracture of neck of right femur    Chief Complaint: No chief complaint on file.       HPI: Mr. Segura is a 60 y/o male, PMH of GERD, HTN, HLD, prior renal stone, who slipped and fell from the top of a flight of steps from a standing position striking his back several times and landing on his buttocks and right leg. There was deformity of the leg after the fall and severe pain. EMS transported the patient to [place] ER, where imaging showed a closed right hip surgical neck fracture. EKG showed A. Fib. without RVR, which is a new finding in this patient. Has been on metoprolol x age 30 2/2 PACs. Not anticoagulated. Upon arrival to [facility] the patient reports right hip pain and spasms of the right leg. He describes the hip pain as constant and dull and rates it 3/10. The patient states rest improves the pain and movement exacerbates it. He reports the leg spasms have started since his leg was placed in a traction splint. He states they are intermittent but progressively becoming more painful. He rates the spasms 8/10. He reports no aggravating or alleviating factors. He denies hitting his head or any loss of consciousness. He denies any SOB, chest pain, numbness, tingling, headache, fever, chills, dizziness, or headache. His workup at [place] ED revealed mild anemia, otherwise no lab abnormalities. Case d/w Ortho, who will consult. Patient transferred to [facility] and will be admitted under hospital medicine.    Past Medical History:   Diagnosis Date    GERD (gastroesophageal reflux disease)     Hyperlipidemia      Hypertension     Kidney stone        Past Surgical History:   Procedure Laterality Date    KNEE SURGERY      ROTATOR CUFF REPAIR      SINUS SURGERY         Review of patient's allergies indicates:  No Known Allergies    Current Facility-Administered Medications on File Prior to Encounter   Medication    [COMPLETED] 0.9%  NaCl infusion    [COMPLETED] hydromorphone (PF) injection 1 mg    [COMPLETED] hydromorphone (PF) injection 1 mg    [COMPLETED] ondansetron injection 4 mg    [COMPLETED] oxymetazoline 0.05 % nasal spray 1 spray     Current Outpatient Medications on File Prior to Encounter   Medication Sig    amlodipine-atorvastatin (CADUET) 5-20 mg per tablet Take 1 tablet by mouth once daily.    clotrimazole-betamethasone 1-0.05% (LOTRISONE) cream APPLY TOPICALLY TWICE DAILY (Patient taking differently: as needed. )    fluticasone (FLONASE) 50 mcg/actuation nasal spray 2 sprays (100 mcg total) by Each Nare route once daily.    metoprolol tartrate (LOPRESSOR) 100 MG tablet Take 1 tablet (100 mg total) by mouth 2 (two) times daily.    omeprazole (PRILOSEC OTC) 20 MG tablet Take 1 tablet by mouth once daily.    potassium chloride SA (K-DUR,KLOR-CON) 10 MEQ tablet TAKE 1 TABLET(10 MEQ) BY MOUTH TWICE DAILY    triamterene-hydrochlorothiazide 37.5-25 mg (MAXZIDE-25) 37.5-25 mg per tablet Take 1 tablet by mouth once daily.     Family History     Problem Relation (Age of Onset)    Heart disease Father    Hepatitis Mother        Tobacco Use    Smoking status: Former Smoker     Packs/day: 0.50     Types: Cigarettes     Start date: 1980     Last attempt to quit: 2018     Years since quittin.4    Smokeless tobacco: Never Used    Tobacco comment: Counselled   Substance and Sexual Activity    Alcohol use: Yes     Comment: social    Drug use: No    Sexual activity: Yes     Partners: Female     Review of Systems   Constitutional: Positive for activity change. Negative for appetite change, chills  and fever.   Respiratory: Negative for cough, shortness of breath and wheezing.         Reports pain with deep breathing to the posterior right mid back    Cardiovascular: Negative for chest pain and palpitations.   Gastrointestinal: Negative for abdominal distention, abdominal pain, constipation, diarrhea, nausea and vomiting.   Genitourinary: Negative for difficulty urinating, dysuria and urgency.   Musculoskeletal: Positive for arthralgias, back pain and myalgias. Negative for joint swelling and neck pain.        C/o muscle spasms in right leg   Skin: Negative for rash and wound.   Neurological: Negative for dizziness, syncope, weakness, numbness and headaches.   Hematological: Does not bruise/bleed easily.   Psychiatric/Behavioral: Negative for agitation, confusion and decreased concentration.   All other systems reviewed and are negative.    Objective:     Vital Signs (Most Recent):    Vital Signs (24h Range):  Temp:  [98 °F (36.7 °C)] 98 °F (36.7 °C)  Pulse:  [] 102  Resp:  [10-21] 14  SpO2:  [94 %-97 %] 95 %  BP: (116-132)/() 132/97        There is no height or weight on file to calculate BMI.    Physical Exam   Constitutional: He is oriented to person, place, and time. Vital signs are normal. He appears well-developed and well-nourished.  Non-toxic appearance. He does not have a sickly appearance. He does not appear ill. He appears distressed.   grimacing   HENT:   Head: Normocephalic and atraumatic.   Right Ear: External ear normal.   Left Ear: External ear normal.   Eyes: Pupils are equal, round, and reactive to light. Conjunctivae and EOM are normal. No scleral icterus.   Neck: Normal range of motion. Neck supple. No JVD present. No tracheal deviation present.   Cardiovascular: Normal heart sounds and intact distal pulses. An irregularly irregular rhythm present. Tachycardia present. Exam reveals no gallop and no friction rub.   No murmur heard.  Pulmonary/Chest: Effort normal and breath  sounds normal. No stridor. No respiratory distress. He has no wheezes. He has no rales. He exhibits no tenderness.   Lungs CTA bilaterally, currently on room air; mild tenderness noted to right mid back on palpation   Abdominal: Soft. Bowel sounds are normal. He exhibits no distension and no mass. There is no tenderness. There is no guarding.   Musculoskeletal: Normal range of motion. He exhibits edema and tenderness. He exhibits no deformity.   Leg examined while in traction splint, mild swelling to right thigh and hip area, tenderness to palpation over right hip, able to wiggle toes   Neurological: He is alert and oriented to person, place, and time. No cranial nerve deficit or sensory deficit. He exhibits normal muscle tone. Coordination normal.   Skin: Skin is warm and dry. Capillary refill takes less than 2 seconds. DP and PT pulses intact bilaterally. He is not diaphoretic.   Psychiatric: He has a normal mood and affect. His behavior is normal. Judgment and thought content normal.   Nursing note and vitals reviewed.        CRANIAL NERVES     CN III, IV, VI   Pupils are equal, round, and reactive to light.  Extraocular motions are normal.        Significant Labs:   BMP:   Recent Labs   Lab 12/22/19  0005   *      K 3.5      CO2 27   BUN 21*   CREATININE 1.1   CALCIUM 8.4*     CBC:   Recent Labs   Lab 12/22/19  0005   WBC 11.70   HGB 13.8*   HCT 38.6*        All pertinent labs within the past 24 hours have been reviewed.    Significant Imaging: I have reviewed and interpreted all pertinent imaging results/findings within the past 24 hours.    Assessment/Plan:     * Closed fracture of neck of right femur  Xray reviewed from [facility], revealed acute fx to the right femur  Pain control PRN  Muscle relaxers prn for muscle spasms  Ortho consulted  Bedrest  Fall precautions  Patient is currently in Amanuel hair traction splint.  NPO for possible surgical intervention in the am  PT/OT eval after  surgery    Paroxysmal atrial fibrillation  Reviewed EKG from [place] showing atrial fibrillation w/o RVR, rate in the 80s.  Obtain EKG and ECHO.  Consult cardiology.  [id number] Score: 1  Will defer anticoagulation to cardiology.     GERD without esophagitis  Chronic. Continue PPI.    Mixed dyslipidemia  Chronic. Continue home statin.   Reviewed previous lipid panel:   Ref. Range 10/30/2019 07:02   Cholesterol Latest Ref Range: 120 - 199 mg/dL 134   HDL Latest Ref Range: 40 - 75 mg/dL 32 (L)   Hdl/Cholesterol Ratio Latest Ref Range: 20.0 - 50.0 % 23.9   LDL Cholesterol External Latest Ref Range: 63.0 - 159.0 mg/dL 57.0 (L)   Non-HDL Cholesterol Latest Units: mg/dL 102   Total Cholesterol/HDL Ratio Latest Ref Range: 2.0 - 5.0  4.2   Triglycerides Latest Ref Range: 30 - 150 mg/dL 225 (H)       Former smoker  Encouraged continued cessation.      Obesity with body mass index 30 or greater  Dietician consulted.        Benign essential hypertension  Chronic, controlled.  Latest blood pressure and vitals reviewed-   Temp:  [98 °F (36.7 °C)]   Pulse:  [81-85]   Resp:  [12-15]   BP: (130)/(103)   SpO2:  [97 %] .   Home meds for hypertension were reviewed and noted below. Will hold anti-hypertensives for now and monitor bp closely.  Hypertension Medications             amlodipine-atorvastatin (CADUET) 5-20 mg per tablet Take 1 tablet by mouth once daily.    metoprolol tartrate (LOPRESSOR) 100 MG tablet Take 1 tablet (100 mg total) by mouth 2 (two) times daily.    triamterene-hydrochlorothiazide 37.5-25 mg (MAXZIDE-25) 37.5-25 mg per tablet Take 1 tablet by mouth once daily.        Will utilize p.r.n. blood pressure medication only if patient's blood pressure greater than  180/110 and he develops symptoms such as worsening chest pain or shortness of breath.      VTE Risk Mitigation (From admission, onward)         Ordered     IP VTE HIGH RISK PATIENT  Once      12/22/19 0311     Place sequential compression device  Until  discontinued      12/22/19 0311     Reason for no Mechanical VTE Prophylaxis  Once     Question:  Reasons:  Answer:  Physician Provided (leave comment)  Comment:  pending surgery in the am    12/22/19 0311                   Jaye Ernst PA-C  Department of Hospital Medicine   [facility]

## 2019-12-22 NOTE — NURSING
Pt transported to surgery by nurse and tech. Tele removed from pt, clothing and jewelry removed from pt and given to spouse. Report given to OR nurse.

## 2019-12-22 NOTE — ASSESSMENT & PLAN NOTE
Chronic, controlled.  Latest blood pressure and vitals reviewed-   Temp:  [98 °F (36.7 °C)]   Pulse:  [81-85]   Resp:  [12-15]   BP: (130)/(103)   SpO2:  [97 %] .   Home meds for hypertension were reviewed and noted below. Will hold anti-hypertensives for now and monitor bp closely.  Hypertension Medications             amlodipine-atorvastatin (CADUET) 5-20 mg per tablet Take 1 tablet by mouth once daily.    metoprolol tartrate (LOPRESSOR) 100 MG tablet Take 1 tablet (100 mg total) by mouth 2 (two) times daily.    triamterene-hydrochlorothiazide 37.5-25 mg (MAXZIDE-25) 37.5-25 mg per tablet Take 1 tablet by mouth once daily.        Will utilize p.r.n. blood pressure medication only if patient's blood pressure greater than  180/110 and he develops symptoms such as worsening chest pain or shortness of breath.

## 2019-12-22 NOTE — ASSESSMENT & PLAN NOTE
Chronic. Continue home statin.   Reviewed previous lipid panel:   Ref. Range 10/30/2019 07:02   Cholesterol Latest Ref Range: 120 - 199 mg/dL 134   HDL Latest Ref Range: 40 - 75 mg/dL 32 (L)   Hdl/Cholesterol Ratio Latest Ref Range: 20.0 - 50.0 % 23.9   LDL Cholesterol External Latest Ref Range: 63.0 - 159.0 mg/dL 57.0 (L)   Non-HDL Cholesterol Latest Units: mg/dL 102   Total Cholesterol/HDL Ratio Latest Ref Range: 2.0 - 5.0  4.2   Triglycerides Latest Ref Range: 30 - 150 mg/dL 225 (H)

## 2019-12-22 NOTE — ANESTHESIA PREPROCEDURE EVALUATION
12/22/2019  Howard Segura is a 59 y.o., male.    Anesthesia Evaluation    I have reviewed the Patient Summary Reports.    I have reviewed the Nursing Notes.   I have reviewed the Medications.     Review of Systems  Anesthesia Hx:  No problems with previous Anesthesia    Social:  Former Smoker, Social Alcohol Use    Cardiovascular:   Hypertension Dysrhythmias atrial fibrillation hyperlipidemia    Renal/:   Chronic Renal Disease renal calculi    Hepatic/GI:   GERD    Musculoskeletal:   Right femoral neck fracture        Physical Exam  General:  Obesity    Airway/Jaw/Neck:  Airway Findings: Mouth Opening: Normal Tongue: Normal  General Airway Assessment: Adult  Mallampati: II        Eyes/Ears/Nose:  EYES/EARS/NOSE FINDINGS: Normal   Dental:  Dental Findings: In tact   Chest/Lungs:  Chest/Lungs Findings: Clear to auscultation, Normal Respiratory Rate     Heart/Vascular:  Heart Findings: Rate: Normal  Rhythm: Regular Rhythm        Mental Status:  Mental Status Findings:  Cooperative, Alert and Oriented         Anesthesia Plan  Type of Anesthesia, risks & benefits discussed:  Anesthesia Type:  general  Patient's Preference:   Intra-op Monitoring Plan: standard ASA monitors  Intra-op Monitoring Plan Comments:   Post Op Pain Control Plan: multimodal analgesia and IV/PO Opioids PRN  Post Op Pain Control Plan Comments:   Induction:   IV  Beta Blocker:  Patient is on a Beta-Blocker and has received one dose within the past 24 hours (No further documentation required).       Informed Consent: Patient understands risks and agrees with Anesthesia plan.  Questions answered. Anesthesia consent signed with patient.  ASA Score: 2     Day of Surgery Review of History & Physical: I have interviewed and examined the patient. I have reviewed the patient's H&P dated:  There are no significant changes.  H&P update referred to the  provider.         Ready For Surgery From Anesthesia Perspective.

## 2019-12-22 NOTE — ASSESSMENT & PLAN NOTE
Xray reviewed from [facility], revealed acute fx to the right femur  Pain control PRN  Muscle relaxers prn for muscle spasms  Ortho consulted  Bedrest  Fall precautions  Patient is currently in Amanuel hair traction splint.  NPO for possible surgical intervention in the am  PT/OT eval after surgery

## 2019-12-22 NOTE — NURSING
Spoke with Dr Andrews (ortho).  He would like to do ORIF Right Hip at 4pm if Cardiology clears patient.  Dr Segal called and he will be here to consult in approx 45 min.  [facility]ul[facility] [employer] called for stat echo.  Hira from [employer] has been called for [employer] equipment.  [employer] also called.

## 2019-12-22 NOTE — SUBJECTIVE & OBJECTIVE
Past Medical History:   Diagnosis Date    GERD (gastroesophageal reflux disease)     Hyperlipidemia     Hypertension     Kidney stone        Past Surgical History:   Procedure Laterality Date    KNEE SURGERY      ROTATOR CUFF REPAIR      SINUS SURGERY         Review of patient's allergies indicates:  No Known Allergies    Current Facility-Administered Medications on File Prior to Encounter   Medication    [COMPLETED] 0.9%  NaCl infusion    [COMPLETED] hydromorphone (PF) injection 1 mg    [COMPLETED] hydromorphone (PF) injection 1 mg    [COMPLETED] ondansetron injection 4 mg    [COMPLETED] oxymetazoline 0.05 % nasal spray 1 spray     Current Outpatient Medications on File Prior to Encounter   Medication Sig    amlodipine-atorvastatin (CADUET) 5-20 mg per tablet Take 1 tablet by mouth once daily.    clotrimazole-betamethasone 1-0.05% (LOTRISONE) cream APPLY TOPICALLY TWICE DAILY (Patient taking differently: as needed. )    fluticasone (FLONASE) 50 mcg/actuation nasal spray 2 sprays (100 mcg total) by Each Nare route once daily.    metoprolol tartrate (LOPRESSOR) 100 MG tablet Take 1 tablet (100 mg total) by mouth 2 (two) times daily.    omeprazole (PRILOSEC OTC) 20 MG tablet Take 1 tablet by mouth once daily.    potassium chloride SA (K-DUR,KLOR-CON) 10 MEQ tablet TAKE 1 TABLET(10 MEQ) BY MOUTH TWICE DAILY    triamterene-hydrochlorothiazide 37.5-25 mg (MAXZIDE-25) 37.5-25 mg per tablet Take 1 tablet by mouth once daily.     Family History     Problem Relation (Age of Onset)    Heart disease Father    Hepatitis Mother        Tobacco Use    Smoking status: Former Smoker     Packs/day: 0.50     Types: Cigarettes     Start date: 1980     Last attempt to quit: 2018     Years since quittin.4    Smokeless tobacco: Never Used    Tobacco comment: Counselled   Substance and Sexual Activity    Alcohol use: Yes     Comment: social    Drug use: No    Sexual activity: Yes     Partners: Female      Review of Systems   Constitutional: Positive for activity change. Negative for appetite change, chills and fever.   Respiratory: Negative for cough, shortness of breath and wheezing.         Reports pain with deep breathing to the posterior right mid back    Cardiovascular: Negative for chest pain and palpitations.   Gastrointestinal: Negative for abdominal distention, abdominal pain, constipation, diarrhea, nausea and vomiting.   Genitourinary: Negative for difficulty urinating, dysuria and urgency.   Musculoskeletal: Positive for arthralgias, back pain and myalgias. Negative for joint swelling and neck pain.        C/o muscle spasms in right leg   Skin: Negative for rash and wound.   Neurological: Negative for dizziness, syncope, weakness, numbness and headaches.   Hematological: Does not bruise/bleed easily.   Psychiatric/Behavioral: Negative for agitation, confusion and decreased concentration.   All other systems reviewed and are negative.    Objective:     Vital Signs (Most Recent):    Vital Signs (24h Range):  Temp:  [98 °F (36.7 °C)] 98 °F (36.7 °C)  Pulse:  [] 102  Resp:  [10-21] 14  SpO2:  [94 %-97 %] 95 %  BP: (116-132)/() 132/97        There is no height or weight on file to calculate BMI.    Physical Exam   Constitutional: He is oriented to person, place, and time. Vital signs are normal. He appears well-developed and well-nourished.  Non-toxic appearance. He does not have a sickly appearance. He does not appear ill. He appears distressed.   grimacing   HENT:   Head: Normocephalic and atraumatic.   Right Ear: External ear normal.   Left Ear: External ear normal.   Eyes: Pupils are equal, round, and reactive to light. Conjunctivae and EOM are normal. No scleral icterus.   Neck: Normal range of motion. Neck supple. No JVD present. No tracheal deviation present.   Cardiovascular: Normal heart sounds and intact distal pulses. An irregularly irregular rhythm present. Tachycardia present. Exam  reveals no gallop and no friction rub.   No murmur heard.  Pulmonary/Chest: Effort normal and breath sounds normal. No stridor. No respiratory distress. He has no wheezes. He has no rales. He exhibits no tenderness.   Lungs CTA bilaterally, currently on room air; mild tenderness noted to right mid back on palpation   Abdominal: Soft. Bowel sounds are normal. He exhibits no distension and no mass. There is no tenderness. There is no guarding.   Musculoskeletal: Normal range of motion. He exhibits edema and tenderness. He exhibits no deformity.   Leg examined while in traction splint, mild swelling to right thigh and hip area, tenderness to palpation over right hip, able to wiggle toes   Neurological: He is alert and oriented to person, place, and time. No cranial nerve deficit or sensory deficit. He exhibits normal muscle tone. Coordination normal.   Skin: Skin is warm and dry. Capillary refill takes less than 2 seconds. DP and PT pulses intact bilaterally. He is not diaphoretic.   Psychiatric: He has a normal mood and affect. His behavior is normal. Judgment and thought content normal.   Nursing note and vitals reviewed.        CRANIAL NERVES     CN III, IV, VI   Pupils are equal, round, and reactive to light.  Extraocular motions are normal.        Significant Labs:   BMP:   Recent Labs   Lab 12/22/19  0005   *      K 3.5      CO2 27   BUN 21*   CREATININE 1.1   CALCIUM 8.4*     CBC:   Recent Labs   Lab 12/22/19  0005   WBC 11.70   HGB 13.8*   HCT 38.6*        All pertinent labs within the past 24 hours have been reviewed.    Significant Imaging: I have reviewed and interpreted all pertinent imaging results/findings within the past 24 hours.

## 2019-12-22 NOTE — ANESTHESIA PROCEDURE NOTES
Intubation  Performed by: Renny Martin CRNA  Authorized by: Samir South MD     Intubation:     Induction:  Intravenous    Attempts:  1    Attempted By:  CRNA    Difficult Airway Encountered?: No      Complications:  None    Airway Device Size:  4.0    Style/Cuff Inflation:  Cuffed    Placement Verified By:  Capnometry    Complicating Factors:  None

## 2019-12-22 NOTE — BRIEF OP NOTE
[facility]  Brief Operative Note    SUMMARY     Surgery Date: 12/22/2019     Surgeon(s) and Role:     * Todd Andrews Jr., MD - Primary    Assisting Surgeon: None    Pre-op Diagnosis:  Fracture of femoral neck, right [S72.001A]    Post-op Diagnosis:  Post-Op Diagnosis Codes:       I t fx r femur  Procedure(s) (LRB):  ORIF, HIP (Right)    Anesthesia: General    Description of the findings of the procedure:  I t fx  orif dhs    Estimated Blood Loss: 300 mL         Specimens:   Specimen (12h ago, onward)    None

## 2019-12-22 NOTE — PT/OT/SLP PROGRESS
Physical Therapy      Patient Name:  Howard Segura   MRN:  [id number]    Patient not seen today (spoke with nurse (María)) secondary to will require ortho clearance and order clarification ollie re: wt bearing status.    Caleb Zaman, PT

## 2019-12-22 NOTE — CONSULTS
Pt fell wet steps yesterday,   Has fx r  Hip,   Transferred here,     Hx  Healthy w pac,      Px pain r hip,   tx splint,    ekg  atr fib vs  Frequent  Pac,     Needs orif hip today hopefully   Then  anticoag as needed,    Vs stable

## 2019-12-22 NOTE — PLAN OF CARE
(Physician in Lead of Transfers)   Outside Transfer Acceptance Note / Regional Referral Center      Transferring Physician: Santino Rivas MD    Accepting Physician: Tabitha Fuller MD    Date of Acceptance: 12/22/2019    Transferring Facility: [facility]     Reason for Transfer: hip fracture    Report from Transferring Physician/Hospital course:  Patient is a 59 y.o. male who has a past medical history of GERD (gastroesophageal reflux disease), Hyperlipidemia, Hypertension, and Kidney stone presented with right hip pain after fall. There was deformity of leg prompting Xray of hip which showed acute fracture. Discussed with orthopedics who have accepted transfer for consult. Patient is hemodynamically stable. EKG showed new onset A-fib but currently rate controlled. No previous EKG to compare.       Wt Readings from Last 3 Encounters:   12/21/19 108.9 kg (240 lb)   03/20/19 110.7 kg (244 lb)   02/12/19 111 kg (244 lb 11.4 oz)     Temp Readings from Last 3 Encounters:   12/21/19 98 °F (36.7 °C) (Oral)   03/20/19 98 °F (36.7 °C)   02/12/19 98.4 °F (36.9 °C) (Oral)     BP Readings from Last 3 Encounters:   12/21/19 (!) 130/103   03/20/19 130/88   02/12/19 137/89     Pulse Readings from Last 3 Encounters:   12/21/19 85   03/20/19 77   02/12/19 87       Labs & Radiographs: see EPIC    Significant Labs:   Lab Results   Component Value Date    WBC 11.70 12/22/2019    HGB 13.8 (L) 12/22/2019    HCT 38.6 (L) 12/22/2019    MCV 87 12/22/2019     12/22/2019       BMP  Lab Results   Component Value Date     10/30/2019    K 3.7 10/30/2019     10/30/2019    CO2 30 (H) 10/30/2019    BUN 14 10/30/2019    CREATININE 0.9 10/30/2019    CALCIUM 9.0 10/30/2019    ANIONGAP 9 10/30/2019    ESTGFRAFRICA >60.0 10/30/2019    EGFRNONAA >60.0 10/30/2019       Significant Imaging:     Right femur xray: offical read pending    EKG: Atrial fibrillation    To Do List:   1. Admit to   Consult orthopedics   Pain  control   Bowel regimen for opioid induced constipation  Cont with PT/OT for gait training and strengthening and restoration of ADL's  Fall precautions   Consult cardiology      Tabitha Fuller MD  Hospital Medicine Staff

## 2019-12-22 NOTE — HPI
Mr. Segura is a 60 y/o male, PMH of GERD, HTN, HLD, prior renal stone, who slipped and fell from the top of a flight of steps from a standing position striking his back several times and landing on his buttocks and right leg. There was deformity of the leg after the fall and severe pain. EMS transported the patient to [facility], where imaging showed a closed right hip surgical neck fracture. EKG showed A. Fib. without RVR, which is a new finding in this patient. Has been on metoprolol x age 30 2/2 PACs. Not anticoagulated. Upon arrival to [facility] the patient reports right hip pain and spasms of the right leg. He describes the hip pain as constant and dull and rates it 3/10. The patient states rest improves the pain and movement exacerbates it. He reports the leg spasms have started since his leg was placed in a traction splint. He states they are intermittent but progressively becoming more painful. He rates the spasms 8/10. He reports no aggravating or alleviating factors. He denies hitting his head or any loss of consciousness. He denies any SOB, chest pain, numbness, tingling, headache, fever, chills, dizziness, or headache. His workup at [facility] revealed mild anemia, otherwise no lab abnormalities. Case d/w Ortho, who will consult. Patient transferred to [facility] and will be admitted under hospital medicine.

## 2019-12-23 PROBLEM — E83.39 HYPOPHOSPHATEMIA: Status: ACTIVE | Noted: 2019-12-23

## 2019-12-23 LAB
ANION GAP SERPL CALC-SCNC: 10 MMOL/L (ref 8–16)
AORTIC ROOT ANNULUS: 3.98 CM
AORTIC VALVE CUSP SEPERATION: 2.36 CM
AV INDEX (PROSTH): 1.27
AV MEAN GRADIENT: 2 MMHG
AV PEAK GRADIENT: 3 MMHG
AV VALVE AREA: 5.76 CM2
AV VELOCITY RATIO: 1.01
BASOPHILS # BLD AUTO: 0 K/UL (ref 0–0.2)
BASOPHILS NFR BLD: 0 % (ref 0–1.9)
BSA FOR ECHO PROCEDURE: 2.37 M2
BUN SERPL-MCNC: 14 MG/DL (ref 6–20)
CALCIUM SERPL-MCNC: 8 MG/DL (ref 8.7–10.5)
CHLORIDE SERPL-SCNC: 104 MMOL/L (ref 95–110)
CO2 SERPL-SCNC: 23 MMOL/L (ref 23–29)
CREAT SERPL-MCNC: 0.9 MG/DL (ref 0.5–1.4)
CV ECHO LV RWT: 0.95 CM
DIFFERENTIAL METHOD: ABNORMAL
DOP CALC AO PEAK VEL: 0.89 M/S
DOP CALC AO VTI: 11.64 CM
DOP CALC LVOT AREA: 4.5 CM2
DOP CALC LVOT DIAMETER: 2.4 CM
DOP CALC LVOT PEAK VEL: 0.9 M/S
DOP CALC LVOT STROKE VOLUME: 67.06 CM3
DOP CALCLVOT PEAK VEL VTI: 14.83 CM
E WAVE DECELERATION TIME: 162.7 MSEC
ECHO LV POSTERIOR WALL: 1.58 CM (ref 0.6–1.1)
EOSINOPHIL # BLD AUTO: 0.1 K/UL (ref 0–0.5)
EOSINOPHIL NFR BLD: 1.6 % (ref 0–8)
ERYTHROCYTE [DISTWIDTH] IN BLOOD BY AUTOMATED COUNT: 13.2 % (ref 11.5–14.5)
EST. GFR  (AFRICAN AMERICAN): >60 ML/MIN/1.73 M^2
EST. GFR  (NON AFRICAN AMERICAN): >60 ML/MIN/1.73 M^2
FRACTIONAL SHORTENING: 32 % (ref 28–44)
GLUCOSE SERPL-MCNC: 179 MG/DL (ref 70–110)
HCT VFR BLD AUTO: 31.8 % (ref 40–54)
HGB BLD-MCNC: 11 G/DL (ref 14–18)
IMM GRANULOCYTES # BLD AUTO: 0.03 K/UL (ref 0–0.04)
INTERVENTRICULAR SEPTUM: 1.59 CM (ref 0.6–1.1)
IVRT: 0.1 MSEC
LEFT ATRIUM SIZE: 4.43 CM
LEFT INTERNAL DIMENSION IN SYSTOLE: 2.28 CM (ref 2.1–4)
LEFT VENTRICLE DIASTOLIC VOLUME INDEX: 19.71 ML/M2
LEFT VENTRICLE DIASTOLIC VOLUME: 45.24 ML
LEFT VENTRICLE MASS INDEX: 86 G/M2
LEFT VENTRICLE SYSTOLIC VOLUME INDEX: 7.7 ML/M2
LEFT VENTRICLE SYSTOLIC VOLUME: 17.71 ML
LEFT VENTRICULAR INTERNAL DIMENSION IN DIASTOLE: 3.33 CM (ref 3.5–6)
LEFT VENTRICULAR MASS: 198.37 G
LYMPHOCYTES # BLD AUTO: 0.5 K/UL (ref 1–4.8)
LYMPHOCYTES NFR BLD: 7.7 % (ref 18–48)
MAGNESIUM SERPL-MCNC: 1.7 MG/DL (ref 1.6–2.6)
MCH RBC QN AUTO: 30.4 PG (ref 27–31)
MCHC RBC AUTO-ENTMCNC: 34.6 G/DL (ref 32–36)
MCV RBC AUTO: 88 FL (ref 82–98)
MONOCYTES # BLD AUTO: 0.6 K/UL (ref 0.3–1)
MONOCYTES NFR BLD: 7.9 % (ref 4–15)
NEUTROPHILS # BLD AUTO: 5.8 K/UL (ref 1.8–7.7)
NEUTROPHILS NFR BLD: 82.4 % (ref 38–73)
NRBC BLD-RTO: 0 /100 WBC
PHOSPHATE SERPL-MCNC: 2 MG/DL (ref 2.7–4.5)
PISA TR MAX VEL: 2.63 M/S
PLATELET # BLD AUTO: 128 K/UL (ref 150–350)
PMV BLD AUTO: 9.7 FL (ref 9.2–12.9)
POTASSIUM SERPL-SCNC: 3.6 MMOL/L (ref 3.5–5.1)
PV PEAK VELOCITY: 0.74 CM/S
RA PRESSURE: 3 MMHG
RBC # BLD AUTO: 3.62 M/UL (ref 4.6–6.2)
RIGHT VENTRICULAR END-DIASTOLIC DIMENSION: 2.58 CM
SODIUM SERPL-SCNC: 137 MMOL/L (ref 136–145)
TDI LATERAL: 0.11 M/S
TDI SEPTAL: 0.08 M/S
TDI: 0.1 M/S
TR MAX PG: 28 MMHG
TRICUSPID ANNULAR PLANE SYSTOLIC EXCURSION: 2.24 CM
TV REST PULMONARY ARTERY PRESSURE: 31 MMHG
WBC # BLD AUTO: 6.98 K/UL (ref 3.9–12.7)

## 2019-12-23 PROCEDURE — 99900035 HC TECH TIME PER 15 MIN (STAT)

## 2019-12-23 PROCEDURE — 97165 OT EVAL LOW COMPLEX 30 MIN: CPT

## 2019-12-23 PROCEDURE — 25000003 PHARM REV CODE 250: Performed by: ORTHOPAEDIC SURGERY

## 2019-12-23 PROCEDURE — 85025 COMPLETE CBC W/AUTO DIFF WBC: CPT

## 2019-12-23 PROCEDURE — 97161 PT EVAL LOW COMPLEX 20 MIN: CPT

## 2019-12-23 PROCEDURE — 84100 ASSAY OF PHOSPHORUS: CPT

## 2019-12-23 PROCEDURE — 94799 UNLISTED PULMONARY SVC/PX: CPT

## 2019-12-23 PROCEDURE — 25000003 PHARM REV CODE 250: Performed by: INTERNAL MEDICINE

## 2019-12-23 PROCEDURE — 97530 THERAPEUTIC ACTIVITIES: CPT

## 2019-12-23 PROCEDURE — 12000002 HC ACUTE/MED SURGE SEMI-PRIVATE ROOM

## 2019-12-23 PROCEDURE — 63600175 PHARM REV CODE 636 W HCPCS: Performed by: INTERNAL MEDICINE

## 2019-12-23 PROCEDURE — 80048 BASIC METABOLIC PNL TOTAL CA: CPT

## 2019-12-23 PROCEDURE — 97116 GAIT TRAINING THERAPY: CPT

## 2019-12-23 PROCEDURE — 25000003 PHARM REV CODE 250: Performed by: SPECIALIST

## 2019-12-23 PROCEDURE — 97802 MEDICAL NUTRITION INDIV IN: CPT

## 2019-12-23 PROCEDURE — 63600175 PHARM REV CODE 636 W HCPCS: Performed by: ORTHOPAEDIC SURGERY

## 2019-12-23 PROCEDURE — 94761 N-INVAS EAR/PLS OXIMETRY MLT: CPT

## 2019-12-23 PROCEDURE — 83735 ASSAY OF MAGNESIUM: CPT

## 2019-12-23 RX ORDER — PANTOPRAZOLE SODIUM 40 MG/1
40 TABLET, DELAYED RELEASE ORAL DAILY
Status: DISCONTINUED | OUTPATIENT
Start: 2019-12-23 | End: 2019-12-25 | Stop reason: HOSPADM

## 2019-12-23 RX ADMIN — SENNOSIDES AND DOCUSATE SODIUM 1 TABLET: 8.6; 5 TABLET ORAL at 08:12

## 2019-12-23 RX ADMIN — METOPROLOL TARTRATE 100 MG: 50 TABLET ORAL at 08:12

## 2019-12-23 RX ADMIN — CEFAZOLIN SODIUM 2 G: 2 SOLUTION INTRAVENOUS at 08:12

## 2019-12-23 RX ADMIN — POTASSIUM PHOSPHATE, MONOBASIC AND POTASSIUM PHOSPHATE, DIBASIC 20 MMOL: 224; 236 INJECTION, SOLUTION, CONCENTRATE INTRAVENOUS at 09:12

## 2019-12-23 RX ADMIN — HYDROMORPHONE HYDROCHLORIDE 1 MG: 2 INJECTION, SOLUTION INTRAMUSCULAR; INTRAVENOUS; SUBCUTANEOUS at 12:12

## 2019-12-23 RX ADMIN — CELECOXIB 200 MG: 100 CAPSULE ORAL at 08:12

## 2019-12-23 RX ADMIN — FAMOTIDINE 20 MG: 20 TABLET ORAL at 08:12

## 2019-12-23 RX ADMIN — CYCLOBENZAPRINE HYDROCHLORIDE 10 MG: 10 TABLET, FILM COATED ORAL at 07:12

## 2019-12-23 RX ADMIN — MUPIROCIN 1 G: 20 OINTMENT TOPICAL at 08:12

## 2019-12-23 RX ADMIN — HYDROMORPHONE HYDROCHLORIDE 1 MG: 2 INJECTION, SOLUTION INTRAMUSCULAR; INTRAVENOUS; SUBCUTANEOUS at 04:12

## 2019-12-23 RX ADMIN — ENOXAPARIN SODIUM 40 MG: 100 INJECTION SUBCUTANEOUS at 08:12

## 2019-12-23 RX ADMIN — PANTOPRAZOLE SODIUM 40 MG: 40 TABLET, DELAYED RELEASE ORAL at 12:12

## 2019-12-23 RX ADMIN — CEFAZOLIN SODIUM 2 G: 2 SOLUTION INTRAVENOUS at 12:12

## 2019-12-23 RX ADMIN — HYDROMORPHONE HYDROCHLORIDE 1 MG: 2 INJECTION, SOLUTION INTRAMUSCULAR; INTRAVENOUS; SUBCUTANEOUS at 06:12

## 2019-12-23 RX ADMIN — AMLODIPINE BESYLATE 5 MG: 5 TABLET ORAL at 08:12

## 2019-12-23 RX ADMIN — HYDROCODONE BITARTRATE AND ACETAMINOPHEN 1 TABLET: 5; 325 TABLET ORAL at 08:12

## 2019-12-23 RX ADMIN — HYDROCODONE BITARTRATE AND ACETAMINOPHEN 1 TABLET: 5; 325 TABLET ORAL at 12:12

## 2019-12-23 RX ADMIN — ATORVASTATIN CALCIUM 20 MG: 20 TABLET, FILM COATED ORAL at 08:12

## 2019-12-23 NOTE — TRANSFER OF CARE
"Anesthesia Transfer of Care Note    Patient: Howard Segura    Procedure(s) Performed: Procedure(s) (LRB):  ORIF, HIP (Right)    Patient location: PACU    Anesthesia Type: general    Transport from OR: Transported from OR on 2-3 L/min O2 by NC with adequate spontaneous ventilation    Post pain: adequate analgesia    Post assessment: no apparent anesthetic complications    Post vital signs: stable    Level of consciousness: awake    Nausea/Vomiting: no nausea/vomiting    Complications: none    Transfer of care protocol was followed      Last vitals:   Visit Vitals  BP (!) 151/90   Pulse 104   Temp 37.8 °C (100.1 °F)   Resp 18   Ht 5' 10" (1.778 m)   Wt 113.4 kg (250 lb)   SpO2 99%   BMI 35.87 kg/m²     "

## 2019-12-23 NOTE — PLAN OF CARE
Plan of care reviewed, patient verbalized understanding. AAOx4. Up ab allison with walker and assist. PT/OT. PIV CDI. IV abx infusing per order. VSS. Tele monitored and maintained. Eliquis to be started tomorrow. Ice pack to R hip. Surgical dressing CDI. Remain afebrile throughout shift. Moderate pain control with PRN meds. Bed in lowest position, Srx2, brakes locked, call light within reach. Patient remains free from fall and injury. Will continue to monitor.

## 2019-12-23 NOTE — SUBJECTIVE & OBJECTIVE
Interval History:  Status post right hip open reduction internal fixation surgery- POD # 1.  Patient reports adequate pain control.  Patient denies any chest pain or shortness of breath.  T-max 100.1 degree F. Atrial fibrillation is rate controlled.    Review of Systems   Constitutional: Positive for activity change. Negative for appetite change, chills and fever.   Respiratory: Negative for cough, shortness of breath and wheezing.    Cardiovascular: Negative for chest pain and palpitations.   Gastrointestinal: Negative for abdominal distention, abdominal pain, constipation, diarrhea, nausea and vomiting.   Genitourinary: Negative for difficulty urinating, dysuria and urgency.   Musculoskeletal: Positive for arthralgias, back pain and myalgias. Negative for joint swelling and neck pain.        C/o muscle spasms in right leg   Skin: Negative for rash and wound.   Neurological: Negative for dizziness, syncope, weakness, numbness and headaches.   Hematological: Does not bruise/bleed easily.   Psychiatric/Behavioral: Negative for agitation, confusion and decreased concentration.   All other systems reviewed and are negative.    Objective:     Vital Signs (Most Recent):  Temp: 97.6 °F (36.4 °C) (12/23/19 0743)  Pulse: 90 (12/23/19 0743)  Resp: 16 (12/23/19 0743)  BP: 111/79 (12/23/19 0743)  SpO2: 96 % (12/23/19 0743) Vital Signs (24h Range):  Temp:  [97.6 °F (36.4 °C)-100.1 °F (37.8 °C)] 97.6 °F (36.4 °C)  Pulse:  [] 90  Resp:  [11-22] 16  SpO2:  [90 %-99 %] 96 %  BP: (102-170)/() 111/79     Weight: 113.4 kg (250 lb)  Body mass index is 35.87 kg/m².    Intake/Output Summary (Last 24 hours) at 12/23/2019 0750  Last data filed at 12/23/2019 0606  Gross per 24 hour   Intake 2654.58 ml   Output 1050 ml   Net 1604.58 ml      Physical Exam   Constitutional: He is oriented to person, place, and time. Vital signs are normal. He appears well-developed and well-nourished.  Non-toxic appearance. He does not have a  sickly appearance. He does not appear ill. He appears distressed.   grimacing   HENT:   Head: Normocephalic and atraumatic.   Right Ear: External ear normal.   Left Ear: External ear normal.   Eyes: Pupils are equal, round, and reactive to light. Conjunctivae and EOM are normal. No scleral icterus.   Neck: Normal range of motion. Neck supple. No JVD present. No tracheal deviation present.   Cardiovascular: Normal heart sounds and intact distal pulses. An irregularly irregular rhythm present. Tachycardia present. Exam reveals no gallop and no friction rub.   No murmur heard.  Irregular   Pulmonary/Chest: Effort normal and breath sounds normal. No stridor. No respiratory distress. He has no wheezes. He has no rales. He exhibits no tenderness.   Abdominal: Soft. Bowel sounds are normal. He exhibits no distension and no mass. There is no tenderness. There is no guarding.   Musculoskeletal: Normal range of motion. He exhibits edema and tenderness. He exhibits no deformity.   Right hip surgical dressing clean dry and intact.   Neurological: He is alert and oriented to person, place, and time. No cranial nerve deficit or sensory deficit. He exhibits normal muscle tone. Coordination normal.   Skin: Skin is warm and dry. Capillary refill takes less than 2 seconds. DP and PT pulses intact bilaterally. He is not diaphoretic.   Psychiatric: He has a normal mood and affect. His behavior is normal. Judgment and thought content normal.   Nursing note and vitals reviewed.      Significant Labs:   BMP:   Recent Labs   Lab 12/23/19  0517   *      K 3.6      CO2 23   BUN 14   CREATININE 0.9   CALCIUM 8.0*   MG 1.7     CBC:   Recent Labs   Lab 12/22/19  0005 12/22/19  0540 12/23/19  0517   WBC 11.70 9.37 6.98   HGB 13.8* 12.5* 11.0*   HCT 38.6* 35.7* 31.8*    146* 128*       Significant Imaging:  Right hip x-ray:External brace is in place.  Redemonstration of a mildly displaced, comminuted proximal right femur  intertrochanteric fracture with subtrochanteric extension and involvement of the greater and lesser trochanters, similar as compared to prior.  No femoroacetabular dislocation.    ECHO: Pending.

## 2019-12-23 NOTE — CONSULTS
Food & Nutrition  Education    Diet Education:Nutrition education for HTN, obesity  Time Spent:15 min  Learners: patient      Nutrition Education provided with handouts: yes      Comments:Pt alert. Education attempted. Pt reports he knows everything he needs to do and is fine.  No plan of any kind reported. Left handout with patient and encouraged to read in case there is any new information.

## 2019-12-23 NOTE — NURSING
Horne removed at 1240, patient tolerated well, due to void by 1840. Urinal at bedside.    As leaving room, patient getting up with PT.

## 2019-12-23 NOTE — PLAN OF CARE
Cm completed the assessment with pt and spouse at bedside.  Pt was independent before surgery.  Spouse will assist pt at home on discharge.  PCP is Dr. Lopez.  Insurance verified as [facility].  Disposition:  Pt will discharge to home with HH per PT/OT Amanda.  Wife signed the pt's choice disclosure form.  Wife has not decided on hh agency.       12/23/19 1050   Discharge Assessment   Assessment Type Discharge Planning Assessment   Confirmed/corrected address and phone number on facesheet? Yes   Assessment information obtained from? Patient   Communicated expected length of stay with patient/caregiver yes   Prior to hospitilization cognitive status: Alert/Oriented   Prior to hospitalization functional status: Independent   Current cognitive status: Alert/Oriented   Current Functional Status: Assistive Equipment;Needs Assistance   Facility Arrived From: home   Lives With spouse   Able to Return to Prior Arrangements yes   Is patient able to care for self after discharge? Yes   Who are your caregiver(s) and their phone number(s)? spouse- Candie - [phone]   Patient's perception of discharge disposition home or selfcare   Readmission Within the Last 30 Days no previous admission in last 30 days   Patient currently being followed by outpatient case management? No   Patient currently receives any other outside agency services? No   Equipment Currently Used at Home none   Do you have any problems affording any of your prescribed medications? No   Is the patient taking medications as prescribed? yes  ([facility] in [place])   Does the patient have transportation home? Yes   Transportation Anticipated family or friend will provide   Dialysis Name and Scheduled days na   Does the patient receive services at the [facility]madin Clinic? No   Discharge Plan A Home with family;Home Health   DME Needed Upon Discharge  walker, rolling;bedside commode   Patient/Family in Agreement with Plan yes

## 2019-12-23 NOTE — PLAN OF CARE
Problem: Physical Therapy Goal  Goal: Physical Therapy Goal  Description  Patient will increase functional independence with mobility PWB (R)LE by performin. Supine to sit with Stand-by Assistance  2. Sit to stand transfer with Stand-by Assistance  3. Bed to chair transfer with Stand-by Assistance using Rolling Walker  4. Gait  x 250 feet with Stand-by Assistance using Rolling Walker.   5. Lower extremity exercise program x 30 reps per handout, with supervision     Outcome: Ongoing, Progressing   PT for functional mob training and/ or ex; pt educated on safety precautions

## 2019-12-23 NOTE — PLAN OF CARE
Ok to pulled rolling walker and bedside commode from [facility] per [employer] [phone].  SW delivered listed equipment to patient's hospital room.  Patient's spouse signed delivery ticket.       12/23/19 1503   Post-Acute Status   Post-Acute Authorization HME   E Status Set-up Complete   Discharge Delays None known at this time

## 2019-12-23 NOTE — ASSESSMENT & PLAN NOTE
Status post right hip open reduction internal fixation - POD # 1.  Continue to follow Orthopedic recommendations.  Needs aggressive incentive spirometry.  Follow hemoglobin and hematocrit closely.  Pain control with PO narcotics and antiemetics as needed.  Physical therapy as per Orthopedics protocol with fall precautions.

## 2019-12-23 NOTE — CONSULTS
[facility]  Cardiology  Progress Note    Patient Name: Howard Segura  MRN: [id number]  Admission Date: 12/22/2019  Hospital Length of Stay: 1 days  Code Status: Full Code   Attending Physician: Brooklyn Holguin MD   Primary Care Physician: Bassam Lopez MD  Expected Discharge Date:   Principal Problem:Closed fracture of neck of right femur    Subjective:     Hospital Course:  Walking   Interval History:  Still in  A fib,   Left atrium enlarged   ROS  Objective:     Vital Signs (Most Recent):  Temp: 99.6 °F (37.6 °C) (12/23/19 1134)  Pulse: 96 (12/23/19 1134)  Resp: 18 (12/23/19 1134)  BP: (!) 146/72 (12/23/19 1134)  SpO2: 96 % (12/23/19 1134) Vital Signs (24h Range):  Temp:  [97.6 °F (36.4 °C)-100.1 °F (37.8 °C)] 99.6 °F (37.6 °C)  Pulse:  [] 96  Resp:  [11-22] 18  SpO2:  [90 %-99 %] 96 %  BP: (102-170)/() 146/72     Weight: 113.4 kg (250 lb)  Body mass index is 35.87 kg/m².    SpO2: 96 %  O2 Device (Oxygen Therapy): nasal cannula      Intake/Output Summary (Last 24 hours) at 12/23/2019 1237  Last data filed at 12/23/2019 0606  Gross per 24 hour   Intake 2654.58 ml   Output 1050 ml   Net 1604.58 ml       Lines/Drains/Airways     Drain                 Urethral Catheter 12/22/19 1645 Latex 16 Fr. less than 1 day          Peripheral Intravenous Line                 Peripheral IV - Single Lumen 12/21/19 2258 20 G Right Hand 1 day         Peripheral IV - Single Lumen 12/22/19 0000 18 G Left Antecubital 1 day                Physical Exam   Lungs clear cor irreg     Significant Labs:   CMP   Recent Labs   Lab 12/22/19  0005 12/22/19  0539 12/23/19  0517    142 137   K 3.5 3.8 3.6    107 104   CO2 27 29 23   * 132* 179*   BUN 21* 18 14   CREATININE 1.1 0.9 0.9   CALCIUM 8.4* 8.3* 8.0*   PROT 6.3  --   --    ALBUMIN 4.2  --   --    BILITOT 0.9  --   --    ALKPHOS 52*  --   --    AST 35  --   --    ALT 41  --   --    ANIONGAP 9 6* 10   ESTGFRAFRICA >60.0 >60 >60   EGFRNONAA >60.0  >60 >60    and CBC   Recent Labs   Lab 12/22/19  0005 12/22/19  0540 12/23/19  0517   WBC 11.70 9.37 6.98   HGB 13.8* 12.5* 11.0*   HCT 38.6* 35.7* 31.8*    146* 128*       Significant Imaging:   Assessment and Plan:   Afib-   Dc on eliquis 5 bid ( he doesn't want xarelto) and usual meds-  F/u with me last 2 weeks in jan and will set up cardioversion   Brief HPI:     Active Diagnoses:    Diagnosis Date Noted POA    PRINCIPAL PROBLEM:  Closed fracture of neck of right femur [S72.001A] 12/22/2019 Yes    Hypophosphatemia [E83.39] 12/23/2019 No    Paroxysmal atrial fibrillation [I48.0] 12/22/2019 Yes    Fracture of femoral neck, right [S72.001A] 12/22/2019 Yes    GERD without esophagitis [K21.9] 10/30/2018 Yes    Former smoker [Z87.891] 05/08/2018 Not Applicable    Mixed dyslipidemia [E78.2] 05/08/2018 Yes    Benign essential hypertension [I10] 11/08/2017 Yes    Obesity with body mass index 30 or greater [E66.9] 11/08/2017 Yes      Problems Resolved During this Admission:       VTE Risk Mitigation (From admission, onward)         Ordered     apixaban tablet 5 mg  2 times daily      12/23/19 1205     IP VTE HIGH RISK PATIENT  Once      12/22/19 1943     Place sequential compression device  Until discontinued      12/22/19 1943     Place sequential compression device  Until discontinued      12/22/19 0311     Reason for no Mechanical VTE Prophylaxis  Once     Question:  Reasons:  Answer:  Physician Provided (leave comment)  Comment:  pending surgery in the am    12/22/19 0311                Maciej Segal MD  Cardiology  [facility]

## 2019-12-23 NOTE — OP NOTE
DATE OF PROCEDURE:  12/22/2019    PROCEDURE:  ORIF, right proximal femur.    PREOPERATIVE DIAGNOSIS:  Intertrochanteric fracture, right proximal femur.    POSTOPERATIVE DIAGNOSIS:  Intertrochanteric fracture, right proximal femur.    ANESTHESIA:  General.    SURGEON:  Todd Andrews Jr., M.D.    ASSISTANT:  Mr. Schilling.    PROCEDURE IN DETAIL:  The patient was taken to the operating room, placed under   general anesthesia.  We then placed him in the fracture table, right leg in   traction, left leg in well-leg traction, supine, brought our C-arm in.  We know   he had some kind of IT type of fracture.  We got better visualization with the   C-arm and we could see that indeed it was intertrochanteric fracture.  There was   a little bit of comminution.  The lesser trochanter was fractured.  There was   some comminution laterally.  It was not a true subtroch fracture.  It did line   up well.  Some of the comminution may have gone into the trochanter.  I thought   that a DHS type of construct would fix this satisfactorily, so we proceeded   along that line, prepped and draped the hip in satisfactory manner.  I made a   lateral incision in the right proximal femur and went through skin and subQ   through the IT band through the vastus lateralis exposing our proximal femur.    We could see the small comminuted fragment laterally.  We were fairly well lined   up.  We did put a bone clamp on that and basically had it almost anatomically   aligned as we reduced things.  We then put our guidepin up the proximal femur   and measured 135.  We then used 85 screw; the distance was 90, deep and I   measured 135 DHS 4-hole side plate.  So, once we put our hip screw up, very good   purchase and put our side plate on 135, attached all that, again squeezed all   these fragments together utilizing a big turkey claw and then put our screws in   laterally.  I was happy the way the screws went in, had good excellent purchase   on the side  plate.  Fracture was nearly anatomic as we reduced the little   pieces.  I did not see any reason to put any further pieces other than the DHS   hip screw in.  A connecting screw was applied and I was happy with our final   x-ray.  We then irrigated and closed the vastus lateralis #1 running Vicryl,   closed the IT band with interrupted #1 Vicryl.  We did leave one Lux-Holder   drain, brought out through a separate stab wound, closed the subQ with 2-0   Vicryl and closed the skin with running subcuticular Monocryl and Steri-Strips   and applied a dressing and then reversed from anesthesia, taken to Recovery in   satisfactory condition.  Blood loss was about 300 mL; one drain was left.      ONEAL/SIDNEY  dd: 12/22/2019 17:40:53 (CST)  td: 12/22/2019 21:21:21 (CST)  Doc ID   #[id number]  Job ID #[id number]    CC:

## 2019-12-23 NOTE — ANESTHESIA POSTPROCEDURE EVALUATION
Anesthesia Post Evaluation    Patient: Howard Segura    Procedure(s) Performed: Procedure(s) (LRB):  ORIF, HIP (Right)    Final Anesthesia Type: general    Patient location during evaluation: PACU  Patient participation: Yes- Able to Participate  Level of consciousness: awake and alert  Post-procedure vital signs: reviewed and stable  Pain management: adequate  Airway patency: patent    PONV status at discharge: No PONV  Anesthetic complications: no      Cardiovascular status: hemodynamically stable  Respiratory status: unassisted and room air  Hydration status: euvolemic  Follow-up not needed.          Vitals Value Taken Time   /78 12/22/2019  7:10 PM   Temp 37.1 °C (98.8 °F) 12/22/2019  7:00 PM   Pulse 102 12/22/2019  7:12 PM   Resp 15 12/22/2019  7:12 PM   SpO2 98 % 12/22/2019  7:12 PM   Vitals shown include unvalidated device data.      No case tracking events are documented in the log.      Pain/Abdifatah Score: Pain Rating Prior to Med Admin: 5 (12/22/2019  7:00 PM)  Pain Rating Post Med Admin: 8 (12/22/2019  7:16 AM)  Abdifatah Score: 8 (12/22/2019  7:00 PM)

## 2019-12-23 NOTE — PT/OT/SLP PROGRESS
Physical Therapy Treatment    Patient Name:  Howard Segura   MRN:  [id number]    Recommendations:     Discharge Recommendations:  home health PT(possibly inpatient rehab)   Discharge Equipment Recommendations: bedside commode, shower chair, walker, rolling   Barriers to discharge: Decreased caregiver support    Assessment:     Howard Segura is a 59 y.o. male admitted with a medical diagnosis of Closed fracture of neck of right femur.  He presents with the following impairments/functional limitations:  weakness, gait instability, impaired cardiopulmonary response to activity, impaired endurance, impaired balance, decreased lower extremity function, impaired sensation, pain, impaired functional mobilty, orthopedic precautions, pt will continue to benefit from skilled acute PT services to improve current level of functional mob and improve overall capacity to perform activities.    Rehab Prognosis: Good; patient would benefit from acute skilled PT services to address these deficits and reach maximum level of function.    Recent Surgery: Procedure(s) (LRB):  ORIF, HIP (Right) 1 Day Post-Op    Plan:     During this hospitalization, patient to be seen BID to address the identified rehab impairments via gait training, therapeutic activities, therapeutic exercises and progress toward the following goals:    · Plan of Care Expires:  01/06/20    Subjective     Chief Complaint: pain to hip, hard to get up from a chair than the bed  Patient/Family Comments/goals: wants to get better  Pain/Comfort:  · Pain Rating 1: 10/10  · Location - Side 1: Right  · Location - Orientation 1: generalized  · Location 1: hip  · Pain Addressed 1: Reposition, Distraction, Nurse notified, Pre-medicate for activity  · Pain Rating Post-Intervention 1: 10/10  · Pain Rating 2: 10/10  · Location - Side 2: Bilateral  · Location - Orientation 2: lower  · Location 2: back  · Pain Addressed 2: Reposition, Distraction, Nurse notified  · Pain Rating  Post-Intervention 2: 5/10      Objective:     Communicated with RN prior to session.  Patient found HOB elevated with oxygen, peripheral IV upon PT entry to room.     General Precautions: Standard, fall   Orthopedic Precautions:RLE partial weight bearing   Braces: N/A     Functional Mobility:  · Bed Mobility:     · Rolling Right: minimum assistance  · Sit to Supine: minimum assistance  · Transfers:     · Sit to Stand:  minimum assistance with rolling walker  · Bed to Chair: minimum assistance with  rolling walker  using  Step Transfer  · Gait: 60 ft RW, WBAT (R)LE, min A, slow-paced, min kyphotic posture, antalgic to (R)LE      AM-PAC 6 CLICK MOBILITY  Turning over in bed (including adjusting bedclothes, sheets and blankets)?: 4  Sitting down on and standing up from a chair with arms (e.g., wheelchair, bedside commode, etc.): 3  Moving from lying on back to sitting on the side of the bed?: 3  Moving to and from a bed to a chair (including a wheelchair)?: 3  Need to walk in hospital room?: 2  Climbing 3-5 steps with a railing?: 1  Basic Mobility Total Score: 16       Therapeutic Activities and Exercises:   Functional mobility training as above; pt educated on ex and mobility techniques and mobilization.      Patient left HOB elevated with all lines intact, call button in reach, RN notified and friend Santino present..    GOALS:   Multidisciplinary Problems     Physical Therapy Goals        Problem: Physical Therapy Goal    Goal Priority Disciplines Outcome Goal Variances Interventions   Physical Therapy Goal     PT, PT/OT Ongoing, Progressing     Description:  Patient will increase functional independence with mobility PWB (R)LE by performin. Supine to sit with Stand-by Assistance  2. Sit to stand transfer with Stand-by Assistance  3. Bed to chair transfer with Stand-by Assistance using Rolling Walker  4. Gait  x 250 feet with Stand-by Assistance using Rolling Walker.   5. Lower extremity exercise program x 30  reps per handout, with supervision                      Time Tracking:     PT Received On: 12/23/19  PT Start Time: 1245     PT Stop Time: 1315  PT Total Time (min): 30 min     Billable Minutes: Gait Training 12 and Therapeutic Activity 15    Treatment Type: Treatment  PT/PTA: PT           Cecy Fisher, PT  12/23/2019

## 2019-12-23 NOTE — ASSESSMENT & PLAN NOTE
Chronic, controlled.  Latest blood pressure and vitals reviewed-   Temp:  [97.6 °F (36.4 °C)-100.1 °F (37.8 °C)]   Pulse:  []   Resp:  [11-22]   BP: (102-170)/()   SpO2:  [90 %-99 %] .   Home meds for hypertension were reviewed and noted below. Will hold anti-hypertensives for now and monitor bp closely.  Hypertension Medications             amlodipine-atorvastatin (CADUET) 5-20 mg per tablet Take 1 tablet by mouth once daily.    metoprolol tartrate (LOPRESSOR) 100 MG tablet Take 1 tablet (100 mg total) by mouth 2 (two) times daily.    triamterene-hydrochlorothiazide 37.5-25 mg (MAXZIDE-25) 37.5-25 mg per tablet Take 1 tablet by mouth once daily.        Will utilize p.r.n. blood pressure medication only if patient's blood pressure greater than  180/110 and he develops symptoms such as worsening chest pain or shortness of breath.

## 2019-12-23 NOTE — ASSESSMENT & PLAN NOTE
Reviewed EKG from [place] showing atrial fibrillation w/o RVR, rate in the 80s.  Follow ECHO and cardiology recommendations.  [id number] Score: 1  Start Xarelto 10 mg daily as per recommendations by Dr. Segal once approved by Dr. Andrews.  I had a long discussion with patient and his wife, we discussed risks and benefits related to use of long-term anticoagulation including high risk for bleeding tendency.  We reviewed fall precautions.  Patient is in agreement with use of Xarelto.

## 2019-12-23 NOTE — PLAN OF CARE
Patient AAO, VSS. Patient heart rhythm a-fib new onset. Heart rate 130-150's post surgery. Dr. Segal notified and 5mg metoprolol x once dose ordered, then metoprolol 100mg P.O, BID starting 12/23/19. Patient heart rate has been running . Patient currently in a-fib asymptomatic. Pain well managed with current pain management plan.  PRN medications utilized. IVF and abx infusing as ordered. Dressing to right hip clean and intact, ice pack noted. O2 @ 2L via nasal cannula. Purposeful hourly/q2hr rounding done during shift to promote patient safety. Will continue to monitor.

## 2019-12-23 NOTE — ASSESSMENT & PLAN NOTE
Body mass index is 35.87 kg/m². Morbid obesity complicates all aspects of disease management from diagnostic modalities to treatment. Weight loss encouraged and health benefits explained to patient.

## 2019-12-23 NOTE — PLAN OF CARE
Problem: Occupational Therapy Goal  Goal: Occupational Therapy Goal  Description  Goals to be met by: 1/10/19     Patient will increase functional independence with ADLs by performing:    LE Dressing with Supervision.  Toileting from toilet/bedside commode with Stand-by Assistance for hygiene and clothing management.   Toilet transfer to toilet with Stand-by Assistance with AD/DME as needed.     Outcome: Ongoing, Progressing   OT POC initiated and established in collaboration with patient and spouse.

## 2019-12-23 NOTE — PT/OT/SLP EVAL
Physical Therapy Evaluation    Patient Name:  Howard Segura   MRN:  [id number]    Recommendations:     Discharge Recommendations:  home health PT, home with home health   Discharge Equipment Recommendations: bedside commode, shower chair, walker, rolling   Barriers to discharge: pt lives on a camp house with ~ 30 steps, 20 ft above the ground; pt has lift in the meantime    Assessment:     Howard Segura is a 59 y.o. male admitted with a medical diagnosis of Closed fracture of neck of right femur.  He presents with the following impairments/functional limitations:  weakness, gait instability, impaired cardiopulmonary response to activity, impaired endurance, impaired balance, decreased lower extremity function, impaired sensation, pain, impaired functional mobilty, orthopedic precautions; pt also has back pain that was bothering him from the fall; able to walk 80 ft with a RW min A PWB (R) LE, has moderate fatigue after walking today; assisted to the chair with instructions on proper hand and foot placements; pt will benefit from skilled acute PT services to improve current level of functional mob and improve overall capacity to perform activities.    Rehab Prognosis: Good; patient would benefit from acute skilled PT services to address these deficits and reach maximum level of function.    Recent Surgery: Procedure(s) (LRB):  ORIF, HIP (Right) 1 Day Post-Op    Plan:     During this hospitalization, patient to be seen BID to address the identified rehab impairments via gait training, therapeutic activities, therapeutic exercises and progress toward the following goals:    · Plan of Care Expires:  01/06/20    Subjective     Chief Complaint: hip and back stiffness and pain  Patient/Family Comments/goals: wanted to walk as tolerated  Pain/Comfort:  · Pain Rating 1: 10/10  · Location - Side 1: Right  · Location - Orientation 1: generalized  · Location 1: hip  · Pain Addressed 1: Distraction, Reposition, Nurse  notified  · Pain Rating Post-Intervention 1: 5/10  · Pain Rating 2: 10/10  · Location - Side 2: Bilateral  · Location - Orientation 2: lower  · Location 2: back  · Pain Addressed 2: Reposition, Distraction, Nurse notified  · Pain Rating Post-Intervention 2: 5/10    Patients cultural, spiritual, Presybeterian conflicts given the current situation: no    Living Environment:  Lives with wife in a camp house with ~ 30 steps, has a lift  Prior to admission, patients level of function was works as a ; active.  Equipment used at home: none.  DME owned (not currently used): none.  Upon discharge, patient will have assistance from wife.    Objective:     Communicated with RN  prior to session.  Patient found HOB elevated with bed alarm, oxygen, peripheral IV, telemetry, zapata catheter  upon PT entry to room.    General Precautions: Standard, fall   Orthopedic Precautions:RLE partial weight bearing   Braces: N/A     Exams:  · Cognitive Exam:  Patient is oriented to Person, Place, Time and Situation  · Gross Motor Coordination:  WFL  · Postural Exam:  Patient presented with the following abnormalities:    · -       Rounded shoulders  · -       Kyphosis  · Skin Integrity/Edema:      · -       Skin integrity: wound to (R) hip due to ORIF and former Fx  · RLE ROM: limited to (R) hip due to pain and weakness; knee and ankle WFL  · RLE Strength: hip: 2-/5 to 3/5; knee and ankle 3+/5 to 4/5  · LLE ROM: WFL  · LLE Strength: WFL    Functional Mobility:  · Transfers:     · Sit to Stand:  moderate assistance with rolling walker  · Bed to Chair: moderate assistance with  rolling walker  using  Step Transfer  · Gait: 80 ft RW, min A, PWB (R)LE  · Balance: Sitting: G/F; Standing: F/F      Therapeutic Activities and Exercises:  Functional mobility training as above; pt educated on safety precautions and mobility techniques, as well as the role and benefits of PT and mobilization.      AM-PAC 6 CLICK MOBILITY  Total  Score:15     Patient left up in chair with all lines intact, call button in reach, RN notified and wife and friend present.    GOALS:   Multidisciplinary Problems     Physical Therapy Goals        Problem: Physical Therapy Goal    Goal Priority Disciplines Outcome Goal Variances Interventions   Physical Therapy Goal     PT, PT/OT Ongoing, Progressing     Description:  Patient will increase functional independence with mobility PWB (R)LE by performin. Supine to sit with Stand-by Assistance  2. Sit to stand transfer with Stand-by Assistance  3. Bed to chair transfer with Stand-by Assistance using Rolling Walker  4. Gait  x 250 feet with Stand-by Assistance using Rolling Walker.   5. Lower extremity exercise program x 30 reps per handout, with supervision                      History:     Past Medical History:   Diagnosis Date    GERD (gastroesophageal reflux disease)     Hyperlipidemia     Hypertension     Kidney stone        Past Surgical History:   Procedure Laterality Date    KNEE SURGERY      ROTATOR CUFF REPAIR      SINUS SURGERY         Time Tracking:     PT Received On: 19  PT Start Time: 1032     PT Stop Time: 1112  PT Total Time (min): 40 min     Billable Minutes: Evaluation 10, Gait Training 15 and Therapeutic Activity 15      Cecy Fisher, PT  2019

## 2019-12-23 NOTE — PT/OT/SLP EVAL
Occupational Therapy   Evaluation    Name: Howard Segura  MRN: [id number]  Admitting Diagnosis:  Closed fracture of neck of right femur 1 Day Post-Op    Recommendations:     Discharge Recommendations: home, home health OT  Discharge Equipment Recommendations:  bedside commode, shower chair, walker, rolling  Barriers to discharge:  None, Other (Comment)(patient's biggest barrier at this time is pain)    Assessment:     Howard Segura is a 59 y.o. male with a medical diagnosis of Closed fracture of neck of right femur.  He presents with significant pain of R hip and lower back pain secondary to also hitting his back during fall down the stairs. Performance deficits affecting function: impaired endurance, impaired self care skills, impaired functional mobilty, gait instability, decreased upper extremity function, decreased lower extremity function, pain, decreased ROM.      Rehab Prognosis: Good; patient would benefit from acute skilled OT services to address these deficits and reach maximum level of function.       Plan:     Patient to be seen 3 x/week to address the above listed problems via self-care/home management, therapeutic activities, therapeutic exercises  · Plan of Care Expires: 01/10/20  · Plan of Care Reviewed with: patient, spouse    Subjective     Chief Complaint: Pain of low back and R hip especially during supine to sit  Patient/Family Comments/goals: To walk    Occupational Profile:  Living Environment: Patient lives with spouse in single story raised home (20+ stairs to enter) with lift present. Patient has walk-in shower with small lip only and raised toilet; pt/spouse report that they just recently renovated their home to [place] in preparation for their long term   Previous level of function: Independent   Roles and Routines: Patient shares household chores with spouse   Equipment Used at Home:  none  Assistance upon Discharge: Patient will have assistance from spouse upon discharge      Pain/Comfort:  · Pain Rating 1: (pt reports 10/10 pain with all mobility, but once up and ambulating, decreased pain (unrated) - )  · Location - Side 1: Right  · Location - Orientation 1: generalized  · Location 1: hip  · Pain Addressed 1: Reposition, Distraction, Cessation of Activity, Nurse notified  · Pain Rating Post-Intervention 1: 0/10  · Pain Rating 2: (10/10 during supine to sit)  · Location - Side 2: Bilateral  · Location - Orientation 2: generalized  · Location 2: back  · Pain Addressed 2: Reposition, Distraction, Cessation of Activity, Nurse notified  · Pain Rating Post-Intervention 2: 0/10    Patients cultural, spiritual, Restorationist conflicts given the current situation: no    Objective:     Communicated with: NurseRylan prior to session.  Patient found HOB elevated with bed alarm, oxygen, peripheral IV, telemetry, zapata catheter(R hip surgical dressing intact) upon OT entry to room.    General Precautions: Standard, fall   Orthopedic Precautions:RLE partial weight bearing   Braces: N/A     Occupational Performance:    Bed Mobility:    · Patient completed Supine to Sit with maximal assistance of 2 people - pt requires increased time (took patient over 15 minutes) and frequent rest breaks; OTR providing constant verbal cues for proper breathing techniques     Functional Mobility/Transfers:  · Patient completed Sit <> Stand Transfer with minimum assistance  with  rolling walker from EOB   · Functional Mobility: Patient ambulating approximately 7 steps from EOB > foot of bed when PT arriving     Activities of Daily Living:  · Grooming: modified independence after set-up of items  · Upper Body Dressing: maximal assistance due to back pain caused by fall down steps  · Lower Body Dressing: total assistance to don/doff socks   · Toileting: presently, pt has a zapata      Cognitive/Visual Perceptual:  Cognitive/Psychosocial Skills:     -       Oriented to: Person, Place, Time and Situation   -       Follows  Commands/attention:Follows multistep  commands  -       Communication: clear/fluent  -       Safety awareness/insight to disability: impaired     Physical Exam:  Balance:    -       Static standing balance is GOOD  Postural examination/scapula alignment:    -       No postural abnormalities identified  Skin integrity: Visible skin intact and Bruising of (R) side low back   Edema:  generalized R LE  Upper Extremity Strength:    -       Right Upper Extremity: WFL except at the shoulder due to rotator cuff injury - pt had surgery several years ago and feels he needs to have surgery again   -       Left Upper Extremity: WFL    AMPA 6 Click ADL:  AMPAC Total Score: 14    Treatment & Education:  - OTR providing education/instruction regarding OT role/POC, safety awareness, fall prevention, proper breathing techniques and use of breathing as a form of pain management, bed mobility sequence, reviewing otho precautions of R LE (PWB) as well as correct/safe transfer sequence/techniques with proper hand placement - pt verbalizes understanding but will benefit from continued reinforcement   Education:    Patient left in standing with PT, PTS and spouse present  with all lines intact, call button in reach, nurse notified and PT, PTS and spouse present    GOALS:   Multidisciplinary Problems     Occupational Therapy Goals        Problem: Occupational Therapy Goal    Goal Priority Disciplines Outcome Interventions   Occupational Therapy Goal     OT, PT/OT Ongoing, Progressing    Description:  Goals to be met by: 1/10/19     Patient will increase functional independence with ADLs by performing:    LE Dressing with Supervision.  Toileting from toilet/bedside commode with Stand-by Assistance for hygiene and clothing management.   Toilet transfer to toilet with Stand-by Assistance with AD/DME as needed.                      History:     Past Medical History:   Diagnosis Date    GERD (gastroesophageal reflux disease)      Hyperlipidemia     Hypertension     Kidney stone        Past Surgical History:   Procedure Laterality Date    KNEE SURGERY      ROTATOR CUFF REPAIR      SINUS SURGERY         Time Tracking:     OT Date of Treatment: 12/23/19  OT Start Time: 0954  OT Stop Time: 1030  OT Total Time (min): 36 min    Billable Minutes:Evaluation 12  Therapeutic Activity 24    ANDREWS Marcelino LOTR  12/23/2019

## 2019-12-23 NOTE — PLAN OF CARE
12/23/19 1220   Post-Acute Status   Post-Acute Authorization E   [facility] Status Referrals Sent

## 2019-12-23 NOTE — CARE UPDATE
12/23/19 1349   Incentive Spirometer   $ Incentive Spirometer Charges refused  (pt refused at this time, states he is too tired from walking)

## 2019-12-23 NOTE — NURSING
Spoke to Dr. Andrews regarding patient being put on blood thinner medication r/t A. Fib rhythm per Dr. Holguin orders.     Per Dr. Andrews patient to start eliquis 5 mg BID tomorrow since Lovenox was given this morning.     Called Dr. Holguin to let him know what we communicated between Dr. Andrews and I, orders put in.

## 2019-12-24 LAB
ANION GAP SERPL CALC-SCNC: 8 MMOL/L (ref 8–16)
BASOPHILS # BLD AUTO: 0.02 K/UL (ref 0–0.2)
BASOPHILS NFR BLD: 0.2 % (ref 0–1.9)
BUN SERPL-MCNC: 14 MG/DL (ref 6–20)
CALCIUM SERPL-MCNC: 8 MG/DL (ref 8.7–10.5)
CHLORIDE SERPL-SCNC: 107 MMOL/L (ref 95–110)
CO2 SERPL-SCNC: 26 MMOL/L (ref 23–29)
CREAT SERPL-MCNC: 0.9 MG/DL (ref 0.5–1.4)
DIFFERENTIAL METHOD: ABNORMAL
EOSINOPHIL # BLD AUTO: 0.1 K/UL (ref 0–0.5)
EOSINOPHIL NFR BLD: 0.6 % (ref 0–8)
ERYTHROCYTE [DISTWIDTH] IN BLOOD BY AUTOMATED COUNT: 13.3 % (ref 11.5–14.5)
EST. GFR  (AFRICAN AMERICAN): >60 ML/MIN/1.73 M^2
EST. GFR  (NON AFRICAN AMERICAN): >60 ML/MIN/1.73 M^2
GLUCOSE SERPL-MCNC: 119 MG/DL (ref 70–110)
HCT VFR BLD AUTO: 29.2 % (ref 40–54)
HGB BLD-MCNC: 10.2 G/DL (ref 14–18)
IMM GRANULOCYTES # BLD AUTO: 0.04 K/UL (ref 0–0.04)
LYMPHOCYTES # BLD AUTO: 1.7 K/UL (ref 1–4.8)
LYMPHOCYTES NFR BLD: 19.8 % (ref 18–48)
MAGNESIUM SERPL-MCNC: 2 MG/DL (ref 1.6–2.6)
MCH RBC QN AUTO: 31 PG (ref 27–31)
MCHC RBC AUTO-ENTMCNC: 34.9 G/DL (ref 32–36)
MCV RBC AUTO: 89 FL (ref 82–98)
MONOCYTES # BLD AUTO: 0.9 K/UL (ref 0.3–1)
MONOCYTES NFR BLD: 10.5 % (ref 4–15)
NEUTROPHILS # BLD AUTO: 5.7 K/UL (ref 1.8–7.7)
NEUTROPHILS NFR BLD: 68.4 % (ref 38–73)
NRBC BLD-RTO: 0 /100 WBC
PHOSPHATE SERPL-MCNC: 2.9 MG/DL (ref 2.7–4.5)
PLATELET # BLD AUTO: 116 K/UL (ref 150–350)
PMV BLD AUTO: 9.9 FL (ref 9.2–12.9)
POTASSIUM SERPL-SCNC: 3.9 MMOL/L (ref 3.5–5.1)
RBC # BLD AUTO: 3.29 M/UL (ref 4.6–6.2)
SODIUM SERPL-SCNC: 141 MMOL/L (ref 136–145)
WBC # BLD AUTO: 8.32 K/UL (ref 3.9–12.7)

## 2019-12-24 PROCEDURE — 97116 GAIT TRAINING THERAPY: CPT

## 2019-12-24 PROCEDURE — 99900035 HC TECH TIME PER 15 MIN (STAT)

## 2019-12-24 PROCEDURE — 36415 COLL VENOUS BLD VENIPUNCTURE: CPT

## 2019-12-24 PROCEDURE — 83735 ASSAY OF MAGNESIUM: CPT

## 2019-12-24 PROCEDURE — 25000003 PHARM REV CODE 250: Performed by: ORTHOPAEDIC SURGERY

## 2019-12-24 PROCEDURE — 94761 N-INVAS EAR/PLS OXIMETRY MLT: CPT

## 2019-12-24 PROCEDURE — 94799 UNLISTED PULMONARY SVC/PX: CPT

## 2019-12-24 PROCEDURE — 97530 THERAPEUTIC ACTIVITIES: CPT

## 2019-12-24 PROCEDURE — 85025 COMPLETE CBC W/AUTO DIFF WBC: CPT

## 2019-12-24 PROCEDURE — 25000003 PHARM REV CODE 250: Performed by: INTERNAL MEDICINE

## 2019-12-24 PROCEDURE — 97535 SELF CARE MNGMENT TRAINING: CPT

## 2019-12-24 PROCEDURE — 80048 BASIC METABOLIC PNL TOTAL CA: CPT

## 2019-12-24 PROCEDURE — 84100 ASSAY OF PHOSPHORUS: CPT

## 2019-12-24 PROCEDURE — 12000002 HC ACUTE/MED SURGE SEMI-PRIVATE ROOM

## 2019-12-24 PROCEDURE — 25000003 PHARM REV CODE 250: Performed by: SPECIALIST

## 2019-12-24 PROCEDURE — 63600175 PHARM REV CODE 636 W HCPCS: Performed by: ORTHOPAEDIC SURGERY

## 2019-12-24 RX ORDER — CYCLOBENZAPRINE HCL 10 MG
10 TABLET ORAL 3 TIMES DAILY PRN
Qty: 90 TABLET | Refills: 2 | Status: SHIPPED | OUTPATIENT
Start: 2019-12-24 | End: 2020-01-23

## 2019-12-24 RX ORDER — OXYCODONE AND ACETAMINOPHEN 10; 325 MG/1; MG/1
1 TABLET ORAL EVERY 4 HOURS PRN
Qty: 42 TABLET | Refills: 0 | Status: SHIPPED | OUTPATIENT
Start: 2019-12-24 | End: 2019-12-31

## 2019-12-24 RX ORDER — TRIAMTERENE/HYDROCHLOROTHIAZID 37.5-25 MG
1 TABLET ORAL DAILY
Status: DISCONTINUED | OUTPATIENT
Start: 2019-12-24 | End: 2019-12-25 | Stop reason: HOSPADM

## 2019-12-24 RX ORDER — CYCLOBENZAPRINE HCL 10 MG
10 TABLET ORAL 3 TIMES DAILY PRN
Qty: 90 TABLET | Refills: 2 | Status: SHIPPED | OUTPATIENT
Start: 2019-12-24 | End: 2020-02-17 | Stop reason: ALTCHOICE

## 2019-12-24 RX ADMIN — CELECOXIB 200 MG: 100 CAPSULE ORAL at 08:12

## 2019-12-24 RX ADMIN — OXYCODONE HYDROCHLORIDE 10 MG: 10 TABLET ORAL at 08:12

## 2019-12-24 RX ADMIN — MUPIROCIN 1 G: 20 OINTMENT TOPICAL at 09:12

## 2019-12-24 RX ADMIN — APIXABAN 5 MG: 2.5 TABLET, FILM COATED ORAL at 09:12

## 2019-12-24 RX ADMIN — HYDROMORPHONE HYDROCHLORIDE 1 MG: 2 INJECTION, SOLUTION INTRAMUSCULAR; INTRAVENOUS; SUBCUTANEOUS at 01:12

## 2019-12-24 RX ADMIN — TRIAMTERENE AND HYDROCHLOROTHIAZIDE 1 TABLET: 37.5; 25 TABLET ORAL at 09:12

## 2019-12-24 RX ADMIN — AMLODIPINE BESYLATE 5 MG: 5 TABLET ORAL at 08:12

## 2019-12-24 RX ADMIN — APIXABAN 5 MG: 2.5 TABLET, FILM COATED ORAL at 08:12

## 2019-12-24 RX ADMIN — METOPROLOL TARTRATE 100 MG: 50 TABLET ORAL at 09:12

## 2019-12-24 RX ADMIN — CYCLOBENZAPRINE HYDROCHLORIDE 10 MG: 10 TABLET, FILM COATED ORAL at 09:12

## 2019-12-24 RX ADMIN — METOPROLOL TARTRATE 100 MG: 50 TABLET ORAL at 08:12

## 2019-12-24 RX ADMIN — CELECOXIB 200 MG: 100 CAPSULE ORAL at 09:12

## 2019-12-24 RX ADMIN — CYCLOBENZAPRINE HYDROCHLORIDE 10 MG: 10 TABLET, FILM COATED ORAL at 08:12

## 2019-12-24 RX ADMIN — SENNOSIDES AND DOCUSATE SODIUM 1 TABLET: 8.6; 5 TABLET ORAL at 08:12

## 2019-12-24 RX ADMIN — SENNOSIDES AND DOCUSATE SODIUM 1 TABLET: 8.6; 5 TABLET ORAL at 09:12

## 2019-12-24 RX ADMIN — OXYCODONE HYDROCHLORIDE 10 MG: 10 TABLET ORAL at 05:12

## 2019-12-24 RX ADMIN — HYDROCODONE BITARTRATE AND ACETAMINOPHEN 1 TABLET: 5; 325 TABLET ORAL at 09:12

## 2019-12-24 RX ADMIN — ATORVASTATIN CALCIUM 20 MG: 20 TABLET, FILM COATED ORAL at 09:12

## 2019-12-24 RX ADMIN — OXYCODONE HYDROCHLORIDE 10 MG: 10 TABLET ORAL at 12:12

## 2019-12-24 RX ADMIN — PANTOPRAZOLE SODIUM 40 MG: 40 TABLET, DELAYED RELEASE ORAL at 08:12

## 2019-12-24 NOTE — PLAN OF CARE
· Per response from [facility] via [facility]--12/24/2019 11:16:54 AM Accepted  [email or web address]     12/24/19 1118   Post-Acute Status   Post-Acute Authorization Home Health/Hospice   Home Health/Hospice Status Set-up Complete   Discharge Delays None known at this time

## 2019-12-24 NOTE — PT/OT/SLP PROGRESS
Occupational Therapy   Treatment    Name: Howard Segura  MRN: [id number]  Admitting Diagnosis:  Closed fracture of neck of right femur  2 Days Post-Op    Recommendations:     Discharge Recommendations: home, home health OT  Discharge Equipment Recommendations:  bedside commode, shower chair, walker, rolling  Barriers to discharge:       Assessment:     Howard Segura is a 59 y.o. male with a medical diagnosis of Closed fracture of neck of right femur.  He presents with decreased mobility and ROM of the R hip s/p femur fracture and ORIF to repair it, no posterior hip precautions and only R LE partial weight bearing precautions, with pain, and fear or more pain, being his greatest barrier. Performance deficits affecting function are pain, decreased ROM, orthopedic precautions, decreased lower extremity function, impaired self care skills, weakness, impaired endurance.     Rehab Prognosis:  Good; patient would benefit from acute skilled OT services to address these deficits and reach maximum level of function.       Plan:     Patient to be seen 3 x/week to address the above listed problems via self-care/home management, community/work re-entry, therapeutic activities, therapeutic exercises  · Plan of Care Expires: 01/10/20  · Plan of Care Reviewed with: patient    Subjective     Pain/Comfort:  · Pain Rating 1: 6/10  · Location - Side 1: Right  · Location - Orientation 1: lateral  · Location 1: back  · Pain Addressed 1: Reposition, Distraction  · Pain Rating Post-Intervention 1: 6/10  · Pain Rating 2: 6/10  · Location - Side 2: Right  · Location - Orientation 2: lateral  · Location 2: thigh  · Pain Addressed 2: Reposition, Distraction  · Pain Rating Post-Intervention 2: 6/10    Objective:     Communicated with: Anson ROMERO prior to session.  Patient found up in chair w/ recliner in use with telemetry, peripheral IV upon OT entry to room.    General Precautions: Standard, fall   Orthopedic Precautions:RLE partial weight  bearing   Braces: N/A     Occupational Performance:     Activities of Daily Living:  · Lower Body Dressing: doffing R sock with dressing stick with SBA and vc for use of AE. donning R sock with dressing stick and Mod A as well as vc for use of AE. pt stated that this will be incredibly helpful at home while he is recovering. He did not have underwear or pants here to practice donning but received all education on this sequencing and was able to repeat it to ROJAS.      [facility] 6 Click ADL:      Treatment & Education:  -ROJAS ed of no posterior hip precautions, pt can bend forward, and twist, and that pain is his biggest barrier. ROJAS ed that there will be activities that cause him pain that he can still participate in ie: toileting hygiene and not having his wife do this for him.   -ROJAS ed for pt to continue to be as (I) and mobile as possible and not fall pray to letting his wife do everything for him.  -ROJAS ed pt to stay ahead of pain at home  -ROJAS ed pt of covering incision for bathing and provided aqua shield x 3 for his use at home.  -pt v/u however, I believe session would have been more helpful if wife had been present.  -pt was quick to end session with me rather than practice with reacher (we had only practiced with dressing stick) in order to visit with friends.    Patient left up in chair with call button in reach, RN Anson notified and two friends presentEducation:      GOALS:   Multidisciplinary Problems     Occupational Therapy Goals        Problem: Occupational Therapy Goal    Goal Priority Disciplines Outcome Interventions   Occupational Therapy Goal     OT, PT/OT Ongoing, Progressing    Description:  Goals to be met by: 1/10/19     Patient will increase functional independence with ADLs by performing:    LE Dressing with Supervision.  Toileting from toilet/bedside commode with Stand-by Assistance for hygiene and clothing management.   Toilet transfer to toilet with Stand-by Assistance with AD/DME as  needed.                      Time Tracking:     OT Date of Treatment: 12/24/19  OT Start Time: 1334  OT Stop Time: 1357  OT Total Time (min): 23 min    Billable Minutes:Self Care/Home Management 23 min    CRYSTAL Castañeda/JOEL  12/24/2019

## 2019-12-24 NOTE — PROGRESS NOTES
[facility]  Orthopedics  Progress Note    Patient Name: Howard Segura  MRN: [id number]  Admission Date: 12/22/2019  Hospital Length of Stay: 2 days  Attending Provider: Brooklyn Holguin MD  Primary Care Provider: Bassam Lopez MD  Follow-up For: Procedure(s) (LRB):  ORIF, HIP (Right)    Post-Operative Day: 2 Days Post-Op  Subjective:     Principal Problem:Closed fracture of neck of right femur    Principal Orthopedic Problem: S/P r Hip ORIF    Interval History: none    Review of patient's allergies indicates:  No Known Allergies    Current Facility-Administered Medications   Medication    acetaminophen tablet 650 mg    amLODIPine tablet 5 mg    apixaban tablet 5 mg    atorvastatin tablet 20 mg    bisacodyl suppository 10 mg    celecoxib capsule 200 mg    cyclobenzaprine tablet 10 mg    dextrose 50% injection 12.5 g    dextrose 50% injection 25 g    fluticasone propionate 50 mcg/actuation nasal spray 100 mcg    glucagon (human recombinant) injection 1 mg    glucose chewable tablet 16 g    glucose chewable tablet 24 g    HYDROcodone-acetaminophen 5-325 mg per tablet 1 tablet    hydromorphone (PF) injection 1 mg    hydromorphone (PF) injection 1 mg    loperamide capsule 2 mg    magnesium oxide tablet 800 mg    magnesium oxide tablet 800 mg    melatonin tablet 6 mg    metoprolol tartrate (LOPRESSOR) tablet 100 mg    mupirocin 2 % ointment 1 g    ondansetron injection 4 mg    ondansetron injection 8 mg    oxyCODONE immediate release tablet Tab 10 mg    pantoprazole EC tablet 40 mg    potassium chloride 10% oral solution 40 mEq    potassium chloride 10% oral solution 40 mEq    potassium chloride 10% oral solution 60 mEq    potassium chloride SA CR tablet 20 mEq    promethazine (PHENERGAN) 6.25 mg in dextrose 5 % 50 mL IVPB    senna-docusate 8.6-50 mg per tablet 1 tablet    sodium chloride 0.9% flush 10 mL    sodium chloride 0.9% flush 10 mL    zolpidem tablet 5 mg  "    Objective:     Vital Signs (Most Recent):  Temp: 97.6 °F (36.4 °C) (12/24/19 0813)  Pulse: 99 (12/24/19 0813)  Resp: 14 (12/24/19 0813)  BP: 123/87 (12/24/19 0813)  SpO2: 97 % (12/24/19 0813) Vital Signs (24h Range):  Temp:  [97.5 °F (36.4 °C)-99.6 °F (37.6 °C)] 97.6 °F (36.4 °C)  Pulse:  [] 99  Resp:  [14-18] 14  SpO2:  [94 %-98 %] 97 %  BP: (118-146)/(72-92) 123/87     Weight: 113.4 kg (250 lb)  Height: 5' 10" (177.8 cm)  Body mass index is 35.87 kg/m².      Intake/Output Summary (Last 24 hours) at 12/24/2019 0823  Last data filed at 12/24/2019 0600  Gross per 24 hour   Intake 510 ml   Output 1250 ml   Net -740 ml       General    Nursing note and vitals reviewed.  Constitutional: He is oriented to person, place, and time. He appears well-developed and well-nourished.   Pulmonary/Chest: Effort normal.   Abdominal: Soft.   Neurological: He is alert and oriented to person, place, and time.   Psychiatric: He has a normal mood and affect. His behavior is normal.             Right Hip Exam     Comments:  RLE DNVI. Dressing C/D/I        Significant Labs:   BMP:   Recent Labs   Lab 12/24/19  0506   *      K 3.9      CO2 26   BUN 14   CREATININE 0.9   CALCIUM 8.0*   MG 2.0     CBC:   Recent Labs   Lab 12/23/19  0517 12/24/19  0506   WBC 6.98 8.32   HGB 11.0* 10.2*   HCT 31.8* 29.2*   * 116*     All pertinent labs within the past 24 hours have been reviewed.    Significant Imaging: None    Assessment/Plan:     * Closed fracture of neck of right femur  OOB with PT and nursing  Stable from an ortho stand point and should be DC'd home today  Flexeril for LBP/ mm spasms          IVELISSE REY  Orthopedics  [facility]  "

## 2019-12-24 NOTE — PLAN OF CARE
Problem: Occupational Therapy Goal  Goal: Occupational Therapy Goal  Description  Goals to be met by: 1/10/19     Patient will increase functional independence with ADLs by performing:    LE Dressing with Supervision.  Toileting from toilet/bedside commode with Stand-by Assistance for hygiene and clothing management.   Toilet transfer to toilet with Stand-by Assistance with AD/DME as needed.     Outcome: Ongoing, Progressing

## 2019-12-24 NOTE — SUBJECTIVE & OBJECTIVE
Principal Problem:Closed fracture of neck of right femur    Principal Orthopedic Problem: S/P r Hip ORIF    Interval History: none    Review of patient's allergies indicates:  No Known Allergies    Current Facility-Administered Medications   Medication    acetaminophen tablet 650 mg    amLODIPine tablet 5 mg    apixaban tablet 5 mg    atorvastatin tablet 20 mg    bisacodyl suppository 10 mg    celecoxib capsule 200 mg    cyclobenzaprine tablet 10 mg    dextrose 50% injection 12.5 g    dextrose 50% injection 25 g    fluticasone propionate 50 mcg/actuation nasal spray 100 mcg    glucagon (human recombinant) injection 1 mg    glucose chewable tablet 16 g    glucose chewable tablet 24 g    HYDROcodone-acetaminophen 5-325 mg per tablet 1 tablet    hydromorphone (PF) injection 1 mg    hydromorphone (PF) injection 1 mg    loperamide capsule 2 mg    magnesium oxide tablet 800 mg    magnesium oxide tablet 800 mg    melatonin tablet 6 mg    metoprolol tartrate (LOPRESSOR) tablet 100 mg    mupirocin 2 % ointment 1 g    ondansetron injection 4 mg    ondansetron injection 8 mg    oxyCODONE immediate release tablet Tab 10 mg    pantoprazole EC tablet 40 mg    potassium chloride 10% oral solution 40 mEq    potassium chloride 10% oral solution 40 mEq    potassium chloride 10% oral solution 60 mEq    potassium chloride SA CR tablet 20 mEq    promethazine (PHENERGAN) 6.25 mg in dextrose 5 % 50 mL IVPB    senna-docusate 8.6-50 mg per tablet 1 tablet    sodium chloride 0.9% flush 10 mL    sodium chloride 0.9% flush 10 mL    zolpidem tablet 5 mg     Objective:     Vital Signs (Most Recent):  Temp: 97.6 °F (36.4 °C) (12/24/19 0813)  Pulse: 99 (12/24/19 0813)  Resp: 14 (12/24/19 0813)  BP: 123/87 (12/24/19 0813)  SpO2: 97 % (12/24/19 0813) Vital Signs (24h Range):  Temp:  [97.5 °F (36.4 °C)-99.6 °F (37.6 °C)] 97.6 °F (36.4 °C)  Pulse:  [] 99  Resp:  [14-18] 14  SpO2:  [94 %-98 %] 97 %  BP:  "(118-146)/(72-92) 123/87     Weight: 113.4 kg (250 lb)  Height: 5' 10" (177.8 cm)  Body mass index is 35.87 kg/m².      Intake/Output Summary (Last 24 hours) at 12/24/2019 0823  Last data filed at 12/24/2019 0600  Gross per 24 hour   Intake 510 ml   Output 1250 ml   Net -740 ml       General    Nursing note and vitals reviewed.  Constitutional: He is oriented to person, place, and time. He appears well-developed and well-nourished.   Pulmonary/Chest: Effort normal.   Abdominal: Soft.   Neurological: He is alert and oriented to person, place, and time.   Psychiatric: He has a normal mood and affect. His behavior is normal.             Right Hip Exam     Comments:  RLE DNVI. Dressing C/D/I        Significant Labs:   BMP:   Recent Labs   Lab 12/24/19  0506   *      K 3.9      CO2 26   BUN 14   CREATININE 0.9   CALCIUM 8.0*   MG 2.0     CBC:   Recent Labs   Lab 12/23/19  0517 12/24/19  0506   WBC 6.98 8.32   HGB 11.0* 10.2*   HCT 31.8* 29.2*   * 116*     All pertinent labs within the past 24 hours have been reviewed.    Significant Imaging: None  "

## 2019-12-24 NOTE — PROGRESS NOTES
12/23/19 2000   PRE-TX-O2   O2 Device (Oxygen Therapy) room air   SpO2 96 %   Pulse Oximetry Type Intermittent   $ Pulse Oximetry - Multiple Charge Pulse Oximetry - Multiple   Positioning   Body Position positioned/repositioned independently   Incentive Spirometer   $ Incentive Spirometer Charges done with encouragement   Administration (IS) proper technique demonstrated   Number of Repetitions (IS) 5   Level Incentive Spirometer (mL) 1000   Patient Tolerance (IS) good

## 2019-12-24 NOTE — PLAN OF CARE
SW met with patient and spouse at bedside. Patient and spouse choose to allow placement with first available provider/agency.  SW sent patient information to [facility] via [facility] system for authorization and acceptance.       12/24/19 1103   Post-Acute Status   Post-Acute Authorization Home Health/Hospice   HME Status Referrals Sent   Patient choice form signed by patient/caregiver List with quality metrics by geographic area provided

## 2019-12-24 NOTE — PLAN OF CARE
POC/Meds reviewed, pt verbalized understanding. Wife at bedside. Vitals stable. Afebrile. Neuro checks performed, no deficits noted. Tele In place-Afib. SCD's on. IS at bedside, return demonstration performed. Ice given for R hip. PRN pain medication administered for complaints of pain.  Reposistions self. Hourly/Q2hr rounding performed, safety maintained. Bed in lowest position, wheels locked, SR up x2, call light in easy reach. No  complaints at this time. Will continue to monitor.

## 2019-12-24 NOTE — PLAN OF CARE
12/24/19 1118   Final Note   Assessment Type Final Discharge Note   Anticipated Discharge Disposition Home-Health   Right Care Referral Info   Post Acute Recommendation Home-care

## 2019-12-24 NOTE — ASSESSMENT & PLAN NOTE
OOB with PT and nursing  Stable from an ortho stand point and should be DC'd home today  Flexeril for LBP/ mm spasms

## 2019-12-24 NOTE — DISCHARGE SUMMARY
[facility] Hospital Medicine  Discharge Summary      Patient Name: Howard Segura  MRN: [id number]  Admission Date: 12/22/2019  Hospital Length of Stay: 2 days  Discharge Date and Time:  12/24/2019 9:45 AM  Attending Physician: Brooklyn Holguin MD   Discharging Provider: Brooklyn Holguin MD  Primary Care Provider: Bassam Lopez MD      HPI:   Mr. Segura is a 60 y/o male, PMH of GERD, HTN, HLD, prior renal stone, who slipped and fell from the top of a flight of steps from a standing position striking his back several times and landing on his buttocks and right leg. There was deformity of the leg after the fall and severe pain. EMS transported the patient to [place] ER, where imaging showed a closed right hip surgical neck fracture. EKG showed A. Fib. without RVR, which is a new finding in this patient. Has been on metoprolol x age 30 2/2 PACs. Not anticoagulated. Upon arrival to [facility] the patient reports right hip pain and spasms of the right leg. He describes the hip pain as constant and dull and rates it 3/10. The patient states rest improves the pain and movement exacerbates it. He reports the leg spasms have started since his leg was placed in a traction splint. He states they are intermittent but progressively becoming more painful. He rates the spasms 8/10. He reports no aggravating or alleviating factors. He denies hitting his head or any loss of consciousness. He denies any SOB, chest pain, numbness, tingling, headache, fever, chills, dizziness, or headache. His workup at [place] ED revealed mild anemia, otherwise no lab abnormalities. Case d/w Ortho, who will consult. Patient transferred to [facility] and will be admitted under hospital medicine.    Procedure(s) (LRB):  ORIF, HIP (Right)      Hospital Course:   Patient was admitted to medicine telemetry floor.  Patient was evaluated by Dr. Andrews from Orthopedics who performed a right hip open reduction internal fixation  surgery.. Post-operative, patient did well.  Patient was noted to have change in cardiac rhythm to atrial fibrillation for which Cardiology team was consulted.  Patient was initiated on long-term anticoagulation therapy.  Patient did not wish to take Xarelto but requested use of Eliquis which was approved by Cardiology team and Orthopedics.  Fall precautions and dangers of long-term anticoagulation discussed with patient and his wife in detail.  Pain adequately controlled. Patient participated with physical therapy. Home health and home physical therapy has been arranged. Fall precautions discussed with the patient.  Smoking cessation counseling also performed with patient. Patient to follow up with primary care physician next week and orthopedic doctor in 2 weeks.  Patient will continue close follow-up with his doctors and Dr. Segal who is planning to perform outpatient DC cardioversion in near future.  In case of chest pain, shortness of breath, stroke or stroke like symptoms, high grade fever or any signs or symptoms of surgical site wound infection symptoms, patient to return to nearest emergency room as soon as possible.    Consults:   Consults (From admission, onward)        Status Ordering Provider     Case Management/  Once     Provider:  (Not yet assigned)    Completed SARA OLIVARES JR     Inpatient consult to Cardiology  Once     Provider:  Maciej Segal MD    Acknowledged SARA OLIVARES JR     Inpatient consult to Orthopedics  Once     Provider:  Sara Olivares Jr., MD    Completed SERENA ARRIOLA     Inpatient consult to Registered Dietitian/Nutritionist  Once     Provider:  (Not yet assigned)    Completed SARA OLIVARES JR       Right hip x-ray: External brace is in place.  Redemonstration of a mildly displaced, comminuted proximal right femur intertrochanteric fracture with subtrochanteric extension and involvement of the greater and lesser trochanters, similar as compared to  "prior.  No femoroacetabular dislocation.  Final Active Diagnoses:    Diagnosis Date Noted POA    PRINCIPAL PROBLEM:  Closed fracture of neck of right femur [S72.001A] 12/22/2019 Yes    Hypophosphatemia [E83.39] 12/23/2019 No    Paroxysmal atrial fibrillation [I48.0] 12/22/2019 Yes    Fracture of femoral neck, right [S72.001A] 12/22/2019 Yes    GERD without esophagitis [K21.9] 10/30/2018 Yes    Former smoker [Z87.891] 05/08/2018 Not Applicable    Mixed dyslipidemia [E78.2] 05/08/2018 Yes    Benign essential hypertension [I10] 11/08/2017 Yes    Obesity with body mass index 30 or greater [E66.9] 11/08/2017 Yes      Problems Resolved During this Admission:       Discharged Condition: good    Disposition:     Follow Up:  Follow-up Information     Bassam Lopez MD In 1 week.    Specialty:  Internal Medicine  Contact information:  [address]  [address]  [place] [address]  [phone]             Todd Andrews Jr, MD In 2 weeks.    Specialties:  Orthopedic Surgery, Surgery  Contact information:  [address]  [address]  [place] [address]  [phone]             Maciej Segal MD In 2 weeks.    Specialties:  Cardiovascular Disease, Cardiology  Contact information:  [address]  [address]  [place] [address]  [phone]             Please follow up.    Contact information:  Please stop smoking.               Patient Instructions:      WALKER FOR HOME USE     Order Specific Question Answer Comments   Type of Walker: Adult (5'4"-6'6")    With wheels? Yes    Height: 5' 10" (1.778 m)    Weight: 113.4 kg (250 lb)    Length of need (1-99 months): 44    Does patient have medical equipment at home? none    Please check all that apply: Patient's condition impairs ambulation.    Please check all that apply: Patient needs help to get in and out of chair.    Please check all that apply: Walker will be used for gait training.    Please check all that apply: Patient is unable to safely ambulate without equipment.  " "    COMMODE FOR HOME USE     Order Specific Question Answer Comments   Type: Standard    Height: 5' 10" (1.778 m)    Weight: 113.4 kg (250 lb)    Does patient have medical equipment at home? none    Length of need (1-99 months): 44      Referral to Home health   Referral Priority: Routine Referral Type: Home Health   Referral Reason: Specialty Services Required   Requested Specialty: Home Health Services   Number of Visits Requested: 1     Diet Cardiac     Other restrictions (specify):   Order Comments: PLEASE OBSERVE FALL PRECAUTIONS.  Use walker for ambulation and observe fall precautions.  Hip precautions as well.     Call MD for:   Order Comments: For worsening symptoms, chest pain, shortness of breath, increased abdominal pain, high grade fever, stroke or stroke like symptoms, immediately go to the nearest Emergency Room or call 911 as soon as possible.       Significant Diagnostic Studies: Labs:   CMP   Recent Labs   Lab 12/23/19  0517 12/24/19  0506    141   K 3.6 3.9    107   CO2 23 26   * 119*   BUN 14 14   CREATININE 0.9 0.9   CALCIUM 8.0* 8.0*   ANIONGAP 10 8   ESTGFRAFRICA >60 >60   EGFRNONAA >60 >60    and CBC   Recent Labs   Lab 12/23/19  0517 12/24/19  0506   WBC 6.98 8.32   HGB 11.0* 10.2*   HCT 31.8* 29.2*   * 116*       Pending Diagnostic Studies:     None         Medications:  Reconciled Home Medications:      Medication List      START taking these medications    apixaban 5 mg Tab  Commonly known as:  ELIQUIS  Take 1 tablet (5 mg total) by mouth 2 (two) times daily.     * cyclobenzaprine 10 MG tablet  Commonly known as:  FLEXERIL  Take 1 tablet (10 mg total) by mouth 3 (three) times daily as needed for Muscle spasms.     * cyclobenzaprine 10 MG tablet  Commonly known as:  FLEXERIL  Take 1 tablet (10 mg total) by mouth 3 (three) times daily as needed for Muscle spasms.     oxyCODONE-acetaminophen  mg per tablet  Commonly known as:  PERCOCET  Take 1 tablet by mouth " every 4 (four) hours as needed for Pain.         * This list has 2 medication(s) that are the same as other medications prescribed for you. Read the directions carefully, and ask your doctor or other care provider to review them with you.            CHANGE how you take these medications    clotrimazole-betamethasone 1-0.05% cream  Commonly known as:  LOTRISONE  APPLY TOPICALLY TWICE DAILY  What changed:    · how much to take  · how to take this  · when to take this  · reasons to take this  · additional instructions        CONTINUE taking these medications    amlodipine-atorvastatin 5-20 mg per tablet  Commonly known as:  CADUET  Take 1 tablet by mouth once daily.     fluticasone propionate 50 mcg/actuation nasal spray  Commonly known as:  FLONASE  2 sprays (100 mcg total) by Each Nare route once daily.     metoprolol tartrate 100 MG tablet  Commonly known as:  LOPRESSOR  Take 1 tablet (100 mg total) by mouth 2 (two) times daily.     potassium chloride SA 10 MEQ tablet  Commonly known as:  K-DUR,KLOR-CON  TAKE 1 TABLET(10 MEQ) BY MOUTH TWICE DAILY     PriLOSEC OTC 20 MG tablet  Generic drug:  omeprazole  Take 1 tablet by mouth once daily.     triamterene-hydrochlorothiazide 37.5-25 mg 37.5-25 mg per tablet  Commonly known as:  MAXZIDE-25  Take 1 tablet by mouth once daily.            Indwelling Lines/Drains at time of discharge:   Lines/Drains/Airways     None                 Time spent on the discharge of patient: 35 minutes  Patient was seen and examined on the date of discharge and determined to be suitable for discharge.         Brooklyn Holguin MD  Department of Hospital Medicine  [facility]

## 2019-12-24 NOTE — NURSING
Discharge instructions given to patient. Patient verbalized understanding. Telemetry monitor removed. PIV removed; pressure and bandage applied. Patient transported off unit via WC.     1745 Patient returned back to floor. Wife to go get different car.     1828 DC cancelled. US ordered. Will place IV and put patient back on tele.

## 2019-12-24 NOTE — PT/OT/SLP PROGRESS
Physical Therapy Treatment    Patient Name:  Howard Segura   MRN:  [id number]    Recommendations:     Discharge Recommendations:  home health PT   Discharge Equipment Recommendations: bedside commode, shower chair, walker, rolling   Barriers to discharge: None    Assessment:     Howard Segura is a 59 y.o. male admitted with a medical diagnosis of Closed fracture of neck of right femur.  He presents with the following impairments/functional limitations:  impaired endurance, weakness, impaired functional mobilty, decreased ROM, orthopedic precautions, decreased lower extremity function, impaired balance, pain . Pt agreeable to therapy and reports he is going home this evening. Pt sit<>stand CGA with RW and gait of 120' SBA with RW.     Rehab Prognosis: Good; patient would benefit from acute skilled PT services to address these deficits and reach maximum level of function.    Recent Surgery: Procedure(s) (LRB):  ORIF, HIP (Right) 2 Days Post-Op    Plan:     During this hospitalization, patient to be seen BID to address the identified rehab impairments via gait training, therapeutic activities, therapeutic exercises and progress toward the following goals:    · Plan of Care Expires:  01/06/20    Subjective     Chief Complaint: Back pain more than the hip.   Patient/Family Comments/goals: To go home this afternoon.   Pain/Comfort:  · Pain Rating 1: 6/10  · Location - Side 1: Right  · Location - Orientation 1: posterior  · Location 1: hip  · Pain Addressed 1: Pre-medicate for activity, Reposition, Distraction  · Pain Rating Post-Intervention 1: 7/10  · Location - Side 2: Right  · Location - Orientation 2: posterior  · Location 2: back      Objective:     Communicated with nurse Griggs prior to session.  Patient found up in chair with telemetry upon PT entry to room.     General Precautions: Standard, fall   Orthopedic Precautions:RLE partial weight bearing   Braces: N/A     Functional Mobility:  · Transfers:     · Sit to  Stand:  contact guard assistance with rolling walker  · Gait: 120' SBA with RW. Slow, antalgic gait, RLE PWB maintained.       AM-PAC 6 CLICK MOBILITY          Therapeutic Activities and Exercises:   Pt required Mod A to bring RLE fwd during descent into chair.     Patient left up in chair with all lines intact, call button in reach and nurse Anson notified..    GOALS:   Multidisciplinary Problems     Physical Therapy Goals        Problem: Physical Therapy Goal    Goal Priority Disciplines Outcome Goal Variances Interventions   Physical Therapy Goal     PT, PT/OT Ongoing, Progressing     Description:  Patient will increase functional independence with mobility PWB (R)LE by performin. Supine to sit with Stand-by Assistance  2. Sit to stand transfer with Stand-by Assistance  3. Bed to chair transfer with Stand-by Assistance using Rolling Walker  4. Gait  x 250 feet with Stand-by Assistance using Rolling Walker.   5. Lower extremity exercise program x 30 reps per handout, with supervision                      Time Tracking:     PT Received On: 19  PT Start Time: 1239     PT Stop Time: 1302  PT Total Time (min): 23 min     Billable Minutes: Gait Training 23 minutes    Treatment Type: Treatment  PT/PTA: PTA     PTA Visit Number: 2     Macy Villagomez, PTA  2019

## 2019-12-25 VITALS
SYSTOLIC BLOOD PRESSURE: 127 MMHG | DIASTOLIC BLOOD PRESSURE: 79 MMHG | HEIGHT: 70 IN | WEIGHT: 250 LBS | BODY MASS INDEX: 35.79 KG/M2 | RESPIRATION RATE: 20 BRPM | TEMPERATURE: 98 F | OXYGEN SATURATION: 99 % | HEART RATE: 85 BPM

## 2019-12-25 LAB
ANION GAP SERPL CALC-SCNC: 8 MMOL/L (ref 8–16)
BASOPHILS # BLD AUTO: 0.05 K/UL (ref 0–0.2)
BASOPHILS NFR BLD: 0.8 % (ref 0–1.9)
BUN SERPL-MCNC: 14 MG/DL (ref 6–20)
CALCIUM SERPL-MCNC: 8.3 MG/DL (ref 8.7–10.5)
CHLORIDE SERPL-SCNC: 105 MMOL/L (ref 95–110)
CO2 SERPL-SCNC: 29 MMOL/L (ref 23–29)
CREAT SERPL-MCNC: 0.9 MG/DL (ref 0.5–1.4)
DIFFERENTIAL METHOD: ABNORMAL
EOSINOPHIL # BLD AUTO: 0.2 K/UL (ref 0–0.5)
EOSINOPHIL NFR BLD: 3.3 % (ref 0–8)
ERYTHROCYTE [DISTWIDTH] IN BLOOD BY AUTOMATED COUNT: 13.3 % (ref 11.5–14.5)
EST. GFR  (AFRICAN AMERICAN): >60 ML/MIN/1.73 M^2
EST. GFR  (NON AFRICAN AMERICAN): >60 ML/MIN/1.73 M^2
GLUCOSE SERPL-MCNC: 105 MG/DL (ref 70–110)
HCT VFR BLD AUTO: 28.5 % (ref 40–54)
HGB BLD-MCNC: 9.9 G/DL (ref 14–18)
IMM GRANULOCYTES # BLD AUTO: 0.04 K/UL (ref 0–0.04)
LYMPHOCYTES # BLD AUTO: 1.9 K/UL (ref 1–4.8)
LYMPHOCYTES NFR BLD: 30.4 % (ref 18–48)
MAGNESIUM SERPL-MCNC: 1.9 MG/DL (ref 1.6–2.6)
MCH RBC QN AUTO: 30.9 PG (ref 27–31)
MCHC RBC AUTO-ENTMCNC: 34.7 G/DL (ref 32–36)
MCV RBC AUTO: 89 FL (ref 82–98)
MONOCYTES # BLD AUTO: 0.7 K/UL (ref 0.3–1)
MONOCYTES NFR BLD: 11.3 % (ref 4–15)
NEUTROPHILS # BLD AUTO: 3.3 K/UL (ref 1.8–7.7)
NEUTROPHILS NFR BLD: 53.5 % (ref 38–73)
NRBC BLD-RTO: 0 /100 WBC
PHOSPHATE SERPL-MCNC: 3.8 MG/DL (ref 2.7–4.5)
PLATELET # BLD AUTO: 131 K/UL (ref 150–350)
PMV BLD AUTO: 10.3 FL (ref 9.2–12.9)
POTASSIUM SERPL-SCNC: 3.8 MMOL/L (ref 3.5–5.1)
RBC # BLD AUTO: 3.2 M/UL (ref 4.6–6.2)
SODIUM SERPL-SCNC: 142 MMOL/L (ref 136–145)
WBC # BLD AUTO: 6.11 K/UL (ref 3.9–12.7)

## 2019-12-25 PROCEDURE — 80048 BASIC METABOLIC PNL TOTAL CA: CPT

## 2019-12-25 PROCEDURE — 25000003 PHARM REV CODE 250: Performed by: SPECIALIST

## 2019-12-25 PROCEDURE — 25000003 PHARM REV CODE 250: Performed by: ORTHOPAEDIC SURGERY

## 2019-12-25 PROCEDURE — 25000003 PHARM REV CODE 250: Performed by: INTERNAL MEDICINE

## 2019-12-25 PROCEDURE — 85025 COMPLETE CBC W/AUTO DIFF WBC: CPT

## 2019-12-25 PROCEDURE — 63600175 PHARM REV CODE 636 W HCPCS: Performed by: ORTHOPAEDIC SURGERY

## 2019-12-25 PROCEDURE — 97116 GAIT TRAINING THERAPY: CPT

## 2019-12-25 PROCEDURE — G8978 MOBILITY CURRENT STATUS: HCPCS | Mod: CM

## 2019-12-25 PROCEDURE — 94761 N-INVAS EAR/PLS OXIMETRY MLT: CPT

## 2019-12-25 PROCEDURE — G8979 MOBILITY GOAL STATUS: HCPCS | Mod: CJ

## 2019-12-25 PROCEDURE — 97530 THERAPEUTIC ACTIVITIES: CPT

## 2019-12-25 PROCEDURE — 83735 ASSAY OF MAGNESIUM: CPT

## 2019-12-25 PROCEDURE — 27000221 HC OXYGEN, UP TO 24 HOURS

## 2019-12-25 PROCEDURE — 84100 ASSAY OF PHOSPHORUS: CPT

## 2019-12-25 PROCEDURE — 94799 UNLISTED PULMONARY SVC/PX: CPT

## 2019-12-25 RX ADMIN — CYCLOBENZAPRINE HYDROCHLORIDE 10 MG: 10 TABLET, FILM COATED ORAL at 11:12

## 2019-12-25 RX ADMIN — SENNOSIDES AND DOCUSATE SODIUM 1 TABLET: 8.6; 5 TABLET ORAL at 09:12

## 2019-12-25 RX ADMIN — APIXABAN 5 MG: 2.5 TABLET, FILM COATED ORAL at 09:12

## 2019-12-25 RX ADMIN — CYCLOBENZAPRINE HYDROCHLORIDE 10 MG: 10 TABLET, FILM COATED ORAL at 06:12

## 2019-12-25 RX ADMIN — MUPIROCIN 1 G: 20 OINTMENT TOPICAL at 09:12

## 2019-12-25 RX ADMIN — ACETAMINOPHEN 650 MG: 325 TABLET ORAL at 09:12

## 2019-12-25 RX ADMIN — METOPROLOL TARTRATE 100 MG: 50 TABLET ORAL at 09:12

## 2019-12-25 RX ADMIN — AMLODIPINE BESYLATE 5 MG: 5 TABLET ORAL at 09:12

## 2019-12-25 RX ADMIN — PANTOPRAZOLE SODIUM 40 MG: 40 TABLET, DELAYED RELEASE ORAL at 09:12

## 2019-12-25 RX ADMIN — HYDROMORPHONE HYDROCHLORIDE 1 MG: 2 INJECTION, SOLUTION INTRAMUSCULAR; INTRAVENOUS; SUBCUTANEOUS at 10:12

## 2019-12-25 RX ADMIN — TRIAMTERENE AND HYDROCHLOROTHIAZIDE 1 TABLET: 37.5; 25 TABLET ORAL at 09:12

## 2019-12-25 RX ADMIN — HYDROCODONE BITARTRATE AND ACETAMINOPHEN 1 TABLET: 5; 325 TABLET ORAL at 06:12

## 2019-12-25 RX ADMIN — CELECOXIB 200 MG: 100 CAPSULE ORAL at 09:12

## 2019-12-25 NOTE — PLAN OF CARE
12/24/19 2020   Patient Assessment/Suction   Level of Consciousness (AVPU) alert   Respiratory Effort Unlabored   PRE-TX-O2   O2 Device (Oxygen Therapy) room air   SpO2 97 %   Pulse Oximetry Type Intermittent   $ Pulse Oximetry - Multiple Charge Pulse Oximetry - Multiple   Pulse 100   Incentive Spirometer   $ Incentive Spirometer Charges other (see comments)  (not done, pt states it was packed bc he was leaving')

## 2019-12-25 NOTE — CARE UPDATE
12/25/19 0710   Patient Assessment/Suction   Level of Consciousness (AVPU) alert   Respiratory Effort Unlabored;Normal   PRE-TX-O2   O2 Device (Oxygen Therapy) nasal cannula   $ Is the patient on Low Flow Oxygen? Yes   Flow (L/min) 2  (placed on RA)   SpO2 99 %   Pulse Oximetry Type Intermittent   $ Pulse Oximetry - Multiple Charge Pulse Oximetry - Multiple   Pulse 103   Resp 18   Incentive Spirometer   $ Incentive Spirometer Charges done with encouragement   Incentive Spirometer Predicted Level (mL) 2700   Administration (IS) mouthpiece;instruction provided, follow-up   Number of Repetitions (IS) 10   Level Incentive Spirometer (mL) 2000   Patient Tolerance (IS) good

## 2019-12-25 NOTE — PLAN OF CARE
Problem: Physical Therapy Goal  Goal: Physical Therapy Goal  Description  Patient will increase functional independence with mobility PWB (R)LE by performin. Supine to sit with Stand-by Assistance  2. Sit to stand transfer with Stand-by Assistance  3. Bed to chair transfer with Stand-by Assistance using Rolling Walker  4. Gait  x 250 feet with Stand-by Assistance using Rolling Walker.   5. Lower extremity exercise program x 30 reps per handout, with supervision     Outcome: Ongoing, Progressing

## 2019-12-25 NOTE — DISCHARGE SUMMARY
[facility] Hospital Medicine  Discharge Summary      Patient Name: Howard Segura  MRN: [id number]  Admission Date: 12/22/2019  Hospital Length of Stay: 3 days  Discharge Date and Time:  12/25/2019 9:45 AM  Attending Physician: Brooklyn Holguin MD   Discharging Provider: Brooklyn Holguin MD  Primary Care Provider: Bassam Lopez MD      HPI:   Mr. Segura is a 60 y/o male, PMH of GERD, HTN, HLD, prior renal stone, who slipped and fell from the top of a flight of steps from a standing position striking his back several times and landing on his buttocks and right leg. There was deformity of the leg after the fall and severe pain. EMS transported the patient to [place] [facility], where imaging showed a closed right hip surgical neck fracture. EKG showed A. Fib. without RVR, which is a new finding in this patient. Has been on metoprolol x age 30 2/2 PACs. Not anticoagulated. Upon arrival to [facility] the patient reports right hip pain and spasms of the right leg. He describes the hip pain as constant and dull and rates it 3/10. The patient states rest improves the pain and movement exacerbates it. He reports the leg spasms have started since his leg was placed in a traction splint. He states they are intermittent but progressively becoming more painful. He rates the spasms 8/10. He reports no aggravating or alleviating factors. He denies hitting his head or any loss of consciousness. He denies any SOB, chest pain, numbness, tingling, headache, fever, chills, dizziness, or headache. His workup at [place] ED revealed mild anemia, otherwise no lab abnormalities. Case d/w Ortho, who will consult. Patient transferred to [facility] and will be admitted under hospital medicine.    Procedure(s) (LRB):  ORIF, HIP (Right)      Hospital Course:   Patient was admitted to medicine telemetry floor.  Patient was evaluated by Dr. Andrews from Orthopedics who performed a right hip open reduction internal fixation  surgery.. Post-operative, patient did well.  Patient was noted to have change in cardiac rhythm to atrial fibrillation for which Cardiology team was consulted.  Patient was initiated on long-term anticoagulation therapy.  Patient did not wish to take Xarelto but requested use of Eliquis which was approved by Cardiology team and Orthopedics.  Fall precautions and dangers of long-term anticoagulation discussed with patient and his wife in detail.  Pain adequately controlled. Patient participated with physical therapy. Home health and home physical therapy has been arranged.  Patient did not feel ready to go home yesterday and thought his right lower extremity was swollen, right lower extremity venous Doppler scan was negative.  Right knee range of motion within normal limits without any focal point tenderness or instability on exam on the day of discharge.  Fall precautions discussed with the patient.  Smoking cessation counseling also performed with patient. Patient to follow up with primary care physician next week and orthopedic doctor in 2 weeks.  Patient will continue close follow-up with his doctors and Dr. Segal who is planning to perform outpatient DC cardioversion in near future.  In case of chest pain, shortness of breath, stroke or stroke like symptoms, high grade fever or any signs or symptoms of surgical site wound infection symptoms, patient to return to nearest emergency room as soon as possible.    Consults:   Consults (From admission, onward)        Status Ordering Provider     Case Management/  Once     Provider:  (Not yet assigned)    Completed SARA OLIVARES JR     Inpatient consult to Cardiology  Once     Provider:  Maciej Segal MD    Acknowledged SARA OLIVARES JR     Inpatient consult to Orthopedics  Once     Provider:  Sara Olivares Jr., MD    Completed SERENA ARRIOLA     Inpatient consult to Registered Dietitian/Nutritionist  Once     Provider:  (Not yet assigned)     Completed SARA OLIVARES JR       Right hip x-ray: External brace is in place.  Redemonstration of a mildly displaced, comminuted proximal right femur intertrochanteric fracture with subtrochanteric extension and involvement of the greater and lesser trochanters, similar as compared to prior.  No femoroacetabular dislocation.    RLE venous doppler scan: No evidence of deep venous thrombosis in the right lower extremity.    Final Active Diagnoses:    Diagnosis Date Noted POA    PRINCIPAL PROBLEM:  Closed fracture of neck of right femur [S72.001A] 12/22/2019 Yes    Hypophosphatemia [E83.39] 12/23/2019 No    Paroxysmal atrial fibrillation [I48.0] 12/22/2019 Yes    Fracture of femoral neck, right [S72.001A] 12/22/2019 Yes    GERD without esophagitis [K21.9] 10/30/2018 Yes    Former smoker [Z87.891] 05/08/2018 Not Applicable    Mixed dyslipidemia [E78.2] 05/08/2018 Yes    Benign essential hypertension [I10] 11/08/2017 Yes    Obesity with body mass index 30 or greater [E66.9] 11/08/2017 Yes      Problems Resolved During this Admission:       Discharged Condition: good    Disposition: Home or Self Care    Follow Up:  Follow-up Information     Bassam Lopez MD In 1 week.    Specialty:  Internal Medicine  Why:   spoke with Jenna in scheduling.  She sent a message to the nurse to schedule and notify pt of appointment  Contact information:  [address]  [address]  [place] [address]  [phone]             Sara Olivares Jr, MD In 2 weeks.    Specialties:  Orthopedic Surgery, Surgery  Why:  hospital f/u on 1/6/20 @ 10:25am  Contact information:  [address]  [address]  [place] [address]  [phone]             Maciej Sgeal MD In 2 weeks.    Specialties:  Cardiovascular Disease, Cardiology  Why:  Please call the office for appointment  Contact information:  [address]  [address]  [place] [address]  [phone]             Please follow up.    Contact information:  Please stop smoking.            "[facility].    Why:  Home Health  Contact information:  [address] [address] [phone]               Patient Instructions:      WALKER FOR HOME USE     Order Specific Question Answer Comments   Type of Walker: Adult (5'4"-6'6")    With wheels? Yes    Height: 5' 10" (1.778 m)    Weight: 113.4 kg (250 lb)    Length of need (1-99 months): 44    Does patient have medical equipment at home? none    Please check all that apply: Patient's condition impairs ambulation.    Please check all that apply: Patient needs help to get in and out of chair.    Please check all that apply: Walker will be used for gait training.    Please check all that apply: Patient is unable to safely ambulate without equipment.      COMMODE FOR HOME USE     Order Specific Question Answer Comments   Type: Standard    Height: 5' 10" (1.778 m)    Weight: 113.4 kg (250 lb)    Does patient have medical equipment at home? none    Length of need (1-99 months): 44      Referral to Home health   Referral Priority: Routine Referral Type: Home Health   Referral Reason: Specialty Services Required   Requested Specialty: Home Health Services   Number of Visits Requested: 1     Diet Cardiac     Other restrictions (specify):   Order Comments: PLEASE OBSERVE FALL PRECAUTIONS.  Use walker for ambulation and observe fall precautions.  Hip precautions as well.     Call MD for:   Order Comments: For worsening symptoms, chest pain, shortness of breath, increased abdominal pain, high grade fever, stroke or stroke like symptoms, immediately go to the nearest Emergency Room or call 911 as soon as possible.       Significant Diagnostic Studies: Labs:   CMP   Recent Labs   Lab 12/24/19  0506 12/25/19  0447    142   K 3.9 3.8    105   CO2 26 29   * 105   BUN 14 14   CREATININE 0.9 0.9   CALCIUM 8.0* 8.3*   ANIONGAP 8 8   ESTGFRAFRICA >60 >60   EGFRNONAA >60 >60    and CBC   Recent Labs   Lab " 12/24/19  0506 12/25/19  0447   WBC 8.32 6.11   HGB 10.2* 9.9*   HCT 29.2* 28.5*   * 131*       Pending Diagnostic Studies:     Procedure Component Value Units Date/Time     Lower Extremity Veins Right [[id number]] Resulted:  12/24/19 1806    Order Status:  Sent Lab Status:  In process Updated:  12/24/19 1858         Medications:  Reconciled Home Medications:      Medication List      START taking these medications    apixaban 5 mg Tab  Commonly known as:  ELIQUIS  Take 1 tablet (5 mg total) by mouth 2 (two) times daily.     * cyclobenzaprine 10 MG tablet  Commonly known as:  FLEXERIL  Take 1 tablet (10 mg total) by mouth 3 (three) times daily as needed for Muscle spasms.     * cyclobenzaprine 10 MG tablet  Commonly known as:  FLEXERIL  Take 1 tablet (10 mg total) by mouth 3 (three) times daily as needed for Muscle spasms.     oxyCODONE-acetaminophen  mg per tablet  Commonly known as:  PERCOCET  Take 1 tablet by mouth every 4 (four) hours as needed for Pain.         * This list has 2 medication(s) that are the same as other medications prescribed for you. Read the directions carefully, and ask your doctor or other care provider to review them with you.            CHANGE how you take these medications    clotrimazole-betamethasone 1-0.05% cream  Commonly known as:  LOTRISONE  APPLY TOPICALLY TWICE DAILY  What changed:    · how much to take  · how to take this  · when to take this  · reasons to take this  · additional instructions        CONTINUE taking these medications    amlodipine-atorvastatin 5-20 mg per tablet  Commonly known as:  CADUET  Take 1 tablet by mouth once daily.     fluticasone propionate 50 mcg/actuation nasal spray  Commonly known as:  FLONASE  2 sprays (100 mcg total) by Each Nare route once daily.     metoprolol tartrate 100 MG tablet  Commonly known as:  LOPRESSOR  Take 1 tablet (100 mg total) by mouth 2 (two) times daily.     potassium chloride SA 10 MEQ tablet  Commonly known as:   K-DUR,KLOR-CON  TAKE 1 TABLET(10 MEQ) BY MOUTH TWICE DAILY     PriLOSEC OTC 20 MG tablet  Generic drug:  omeprazole  Take 1 tablet by mouth once daily.     triamterene-hydrochlorothiazide 37.5-25 mg 37.5-25 mg per tablet  Commonly known as:  MAXZIDE-25  Take 1 tablet by mouth once daily.            Indwelling Lines/Drains at time of discharge:   Lines/Drains/Airways     None                 Time spent on the discharge of patient: 35 minutes  Patient was seen and examined on the date of discharge and determined to be suitable for discharge.         Brooklyn Holguin MD  Department of Hospital Medicine  [facility]

## 2019-12-25 NOTE — NURSING
Discharge pt to home via car. Discharge instructions given, Pt verbalized understanding. IV removed without difficulty. Nurse relinquished care.

## 2019-12-25 NOTE — PT/OT/SLP PROGRESS
Physical Therapy Treatment    Patient Name:  Howard Segura   MRN:  [id number]    Recommendations:     Discharge Recommendations:  home, home with home health, home health PT   Discharge Equipment Recommendations: (has rolling walker)   Barriers to discharge: pain    Assessment:     Howard Segura is a 59 y.o. male admitted with a medical diagnosis of Closed fracture of neck of right femur.  He presents with the following impairments/functional limitations:  pain, decreased lower extremity function, gait instability, impaired functional mobilty, orthopedic precautions, impaired balance, weakness, impaired endurance, decreased ROM  .    Rehab Prognosis: Good; patient would benefit from acute skilled PT services to address these deficits and reach maximum level of function.    Recent Surgery: Procedure(s) (LRB):  ORIF, HIP (Right) 3 Days Post-Op    Plan:     During this hospitalization, patient to be seen BID to address the identified rehab impairments via gait training, therapeutic exercises, therapeutic activities and progress toward the following goals:    · Plan of Care Expires:  01/06/20    Subjective     Chief Complaint: pain  Patient/Family Comments/goals: agrees to work with PT  Pain/Comfort:  · Pain Rating 1: 6/10  · Location - Side 1: Right  · Location 1: hip  · Pain Addressed 1: Reposition, Distraction, Cessation of Activity      Objective:     Communicated with nurse (Ibeth) prior to session.  Patient found up in chair with SCD, cryotherapy upon PT entry to room.     General Precautions: Standard, fall   Orthopedic Precautions:RLE partial weight bearing   Braces: N/A     Functional Mobility:  · Transfers:     · Sit to Stand:  minimum assistance and moderate assistance with rolling walker and very slow  · Gait: 60' with RW with CGA and cues PWB RLE      AM-PAC 6 CLICK MOBILITY  Turning over in bed (including adjusting bedclothes, sheets and blankets)?: 3  Sitting down on and standing up from a chair with arms  (e.g., wheelchair, bedside commode, etc.): 2  Moving from lying on back to sitting on the side of the bed?: 2  Moving to and from a bed to a chair (including a wheelchair)?: 2  Need to walk in hospital room?: 3  Climbing 3-5 steps with a railing?: 2  Basic Mobility Total Score: 14       Therapeutic Activities and Exercises:   SEATED: LAQ (R with assist of L), ankle Df/PF) pt instructed to perform through day  Educated pt and wife re proper technique for step into house and car transfer.    Patient left up in chair with call button in reach, chair alarm on, wife present and SCDs on BLEs and cold packs positioned by patient..    GOALS:   Multidisciplinary Problems     Physical Therapy Goals        Problem: Physical Therapy Goal    Goal Priority Disciplines Outcome Goal Variances Interventions   Physical Therapy Goal     PT, PT/OT Ongoing, Progressing     Description:  Patient will increase functional independence with mobility PWB (R)LE by performin. Supine to sit with Stand-by Assistance  2. Sit to stand transfer with Stand-by Assistance  3. Bed to chair transfer with Stand-by Assistance using Rolling Walker  4. Gait  x 250 feet with Stand-by Assistance using Rolling Walker.   5. Lower extremity exercise program x 30 reps per handout, with supervision                      Time Tracking:     PT Received On: 19  PT Start Time: 0945     PT Stop Time: 1020  PT Total Time (min): 35 min     Billable Minutes: Gait Training 20 and Therapeutic Activity 15    Treatment Type: Treatment  PT/PTA: PT     PTA Visit Number: 0     Caleb Zaman, PT  2019

## 2019-12-25 NOTE — PLAN OF CARE
Awake, alert, and oriented. Urinal at BS. VS stable. Telemetry monitored; afib 80s-90s. Remains afebrile throughout shift. Pt resting comfortably in bed. TEDs/SCDs in place. Fluid pill restarted. Awaiting results of RLE US. Mild pain control w/ PRN pain medication. Patient ambulated with PT and OT this shift. Bed in low position, brakes locked, side rails up x 2, call light w/in reach. Verbalized understanding of POC. Open communication facilitated. Will continue to monitor.

## 2019-12-25 NOTE — NURSING
IVELISSE Travis contacted regarding patient discharge, pt approved for discharge from orthopedic standpoint

## 2019-12-25 NOTE — PLAN OF CARE
Patient AAO, VSS. Pt verbalized understanding of POC. Pain well managed with current pain management plan.  PRN medications utilized. Tele monitored; a-fib.  Swelling noted to right leg. Incision dressing clean, dry, and intact. Bed rest maintain as ordered. Purposeful hourly/q2hr rounding done during shift to promote patient safety. Patient free from falls and injury during shift. Will continue to monitor.

## 2019-12-26 ENCOUNTER — TELEPHONE (OUTPATIENT)
Dept: ORTHOPEDICS | Facility: CLINIC | Age: 59
End: 2019-12-26

## 2019-12-26 NOTE — TELEPHONE ENCOUNTER
----- Message from Taylor Guerrero sent at 12/26/2019  9:46 AM CST -----  Contact: Susie Molina from [facility]  She was calling to get Wound Care orders for this patient since his surgery, patient has a right hip incision. She can take a verbal at [phone] or fax it to [phone].

## 2019-12-27 ENCOUNTER — PATIENT OUTREACH (OUTPATIENT)
Dept: ADMINISTRATIVE | Facility: CLINIC | Age: 59
End: 2019-12-27

## 2019-12-27 ENCOUNTER — TELEPHONE (OUTPATIENT)
Dept: FAMILY MEDICINE | Facility: CLINIC | Age: 59
End: 2019-12-27

## 2019-12-27 NOTE — PROGRESS NOTES
C3 nurse attempted to contact patient. The following occurred:   C3 nurse attempted to contact Howard Segura  for a TCC post hospital discharge follow up call. The patient is unable to conduct the call @ this time. The patient requested a callback.    The patient does not have a scheduled HOSFU appointment within 7-14 days post hospital discharge date 12/25/2019. [facility].

## 2019-12-27 NOTE — TELEPHONE ENCOUNTER
----- Message from Keira Larsen sent at 12/24/2019 11:09 AM CST -----  Regarding: Hospital Discharge Follow Up Appt requested  Contact: [phone]  Hospital: [facility]    Discharge date: 12/24/19    Discharge instructions: followup with PCP with 7-10 days    Please contact PT to antelmo followup appointment    Thanks, keira

## 2019-12-27 NOTE — TELEPHONE ENCOUNTER
Please forward this important TCC information to your provider in order to maximize the post discharge care delivery of this patient.    C3 nurse spoke with Howard Seguar ( Wife)  for a TCC post hospital discharge follow up call. The patient does not have a scheduled HOSFU appointment with Bassam Lopez MD  within 7-14 days post hospital discharge date 12/25/2019. C3 nurse was unable to schedule HOSFU appointment in [facility].  Please contact pcp and schedule follow up appointment using HOSFU visit type on or before 01/10/2020.    Respectfully,  Melisa Isabel LPN    [facility]    [email or web address]       Please do not reply to this message, as this inbox is not routinely monitored.

## 2019-12-31 ENCOUNTER — TELEPHONE (OUTPATIENT)
Dept: FAMILY MEDICINE | Facility: CLINIC | Age: 59
End: 2019-12-31

## 2019-12-31 NOTE — TELEPHONE ENCOUNTER
----- Message from Keira Larsen sent at 12/24/2019 11:09 AM CST -----  Regarding: Hospital Discharge Follow Up Appt requested  Contact: [phone]  Hospital: [facility]    Discharge date: 12/24/19    Discharge instructions: followup with PCP with 7-10 days    Please contact PT to antelmo followup appointment    Thanks, keira

## 2020-01-06 ENCOUNTER — OFFICE VISIT (OUTPATIENT)
Dept: ORTHOPEDICS | Facility: CLINIC | Age: 60
End: 2020-01-06
Payer: COMMERCIAL

## 2020-01-06 VITALS
HEIGHT: 70 IN | WEIGHT: 237 LBS | DIASTOLIC BLOOD PRESSURE: 84 MMHG | BODY MASS INDEX: 33.93 KG/M2 | HEART RATE: 77 BPM | SYSTOLIC BLOOD PRESSURE: 120 MMHG

## 2020-01-06 DIAGNOSIS — S72.001D CLOSED FRACTURE OF NECK OF RIGHT FEMUR WITH ROUTINE HEALING, SUBSEQUENT ENCOUNTER: ICD-10-CM

## 2020-01-06 DIAGNOSIS — Z87.81 S/P ORIF (OPEN REDUCTION INTERNAL FIXATION) FRACTURE: Primary | ICD-10-CM

## 2020-01-06 DIAGNOSIS — R07.81 RIB PAIN ON RIGHT SIDE: ICD-10-CM

## 2020-01-06 DIAGNOSIS — Z98.890 S/P ORIF (OPEN REDUCTION INTERNAL FIXATION) FRACTURE: Primary | ICD-10-CM

## 2020-01-06 DIAGNOSIS — M54.50 LUMBAR PAIN: ICD-10-CM

## 2020-01-06 DIAGNOSIS — S22.31XA CLOSED FRACTURE OF ONE RIB OF RIGHT SIDE, INITIAL ENCOUNTER: ICD-10-CM

## 2020-01-06 DIAGNOSIS — M51.36 DISC DEGENERATION, LUMBAR: ICD-10-CM

## 2020-01-06 PROCEDURE — 99024 POSTOP FOLLOW-UP VISIT: CPT | Mod: S$GLB,,, | Performed by: ORTHOPAEDIC SURGERY

## 2020-01-06 PROCEDURE — 99024 PR POST-OP FOLLOW-UP VISIT: ICD-10-PCS | Mod: S$GLB,,, | Performed by: ORTHOPAEDIC SURGERY

## 2020-01-06 RX ORDER — OXYCODONE AND ACETAMINOPHEN 5; 325 MG/1; MG/1
1 TABLET ORAL EVERY 4 HOURS PRN
COMMUNITY
End: 2020-01-06

## 2020-01-06 RX ORDER — GUAIFENESIN 400 MG/1
400 TABLET ORAL
COMMUNITY
End: 2020-05-11

## 2020-01-06 RX ORDER — DOCUSATE SODIUM 100 MG/1
100 CAPSULE, LIQUID FILLED ORAL 2 TIMES DAILY
COMMUNITY
End: 2020-05-11

## 2020-01-06 RX ORDER — OXYCODONE AND ACETAMINOPHEN 10; 325 MG/1; MG/1
1 TABLET ORAL EVERY 6 HOURS PRN
Qty: 28 TABLET | Refills: 0 | Status: SHIPPED | OUTPATIENT
Start: 2020-01-06 | End: 2020-01-13

## 2020-01-06 NOTE — PROGRESS NOTES
Formerly [facility] ORTHOPEDICS POST-OP NOTE    Subjective:           Chief Complaint:   Chief Complaint   Patient presents with    Right Hip - Post-op Evaluation     ORIF right hip/femoral neck 12.22.19. States that his leg does bother him still. Pain is better is after he starts to move around and standing.        Past Medical History:   Diagnosis Date    GERD (gastroesophageal reflux disease)     Hyperlipidemia     Hypertension     Kidney stone        Past Surgical History:   Procedure Laterality Date    KNEE SURGERY      OPEN REDUCTION AND INTERNAL FIXATION (ORIF) OF INJURY OF HIP Right 12/22/2019    Procedure: ORIF, HIP;  Surgeon: Todd Andrews Jr., MD;  Location: [facility];  Service: Orthopedics;  Laterality: Right;    ROTATOR CUFF REPAIR      SINUS SURGERY         Current Outpatient Medications   Medication Sig    amlodipine-atorvastatin (CADUET) 5-20 mg per tablet Take 1 tablet by mouth once daily.    apixaban (ELIQUIS) 5 mg Tab Take 1 tablet (5 mg total) by mouth 2 (two) times daily.    clotrimazole-betamethasone 1-0.05% (LOTRISONE) cream APPLY TOPICALLY TWICE DAILY (Patient taking differently: as needed. )    cyclobenzaprine (FLEXERIL) 10 MG tablet Take 1 tablet (10 mg total) by mouth 3 (three) times daily as needed for Muscle spasms.    cyclobenzaprine (FLEXERIL) 10 MG tablet Take 1 tablet (10 mg total) by mouth 3 (three) times daily as needed for Muscle spasms.    docusate sodium (COLACE) 100 MG capsule Take 100 mg by mouth 2 (two) times daily.    fluticasone (FLONASE) 50 mcg/actuation nasal spray 2 sprays (100 mcg total) by Each Nare route once daily.    guaiFENesin (MUCUS RELIEF) 400 mg Tab Take 400 mg by mouth.    MAGNESIUM ORAL Take 400 mg by mouth once.    metoprolol tartrate (LOPRESSOR) 100 MG tablet Take 1 tablet (100 mg total) by mouth 2 (two) times daily.    omeprazole (PRILOSEC OTC) 20 MG tablet Take 1 tablet by mouth once daily.    oxyCODONE-acetaminophen (PERCOCET) 5-325 mg per  tablet Take 1 tablet by mouth every 4 (four) hours as needed for Pain.    phenylephrine HCl (SINEX REGULAR NASL) by Nasal route.    potassium chloride SA (K-DUR,KLOR-CON) 10 MEQ tablet TAKE 1 TABLET(10 MEQ) BY MOUTH TWICE DAILY    triamterene-hydrochlorothiazide 37.5-25 mg (MAXZIDE-25) 37.5-25 mg per tablet Take 1 tablet by mouth once daily.     No current facility-administered medications for this visit.        Review of patient's allergies indicates:  No Known Allergies    Family History   Problem Relation Age of Onset    Hepatitis Mother     Heart disease Father        Social History     Socioeconomic History    Marital status:      Spouse name: Not on file    Number of children: 0    Years of education: Not on file    Highest education level: Not on file   Occupational History    Occupation: [employer]   Social Needs    Financial resource strain: Not on file    Food insecurity:     Worry: Not on file     Inability: Not on file    Transportation needs:     Medical: Not on file     Non-medical: Not on file   Tobacco Use    Smoking status: Former Smoker     Packs/day: 0.50     Types: Cigarettes     Start date: 1980     Last attempt to quit: 2018     Years since quittin.4    Smokeless tobacco: Never Used    Tobacco comment: Counselled   Substance and Sexual Activity    Alcohol use: Yes     Comment: social    Drug use: No    Sexual activity: Yes     Partners: Female   Lifestyle    Physical activity:     Days per week: Not on file     Minutes per session: Not on file    Stress: Not on file   Relationships    Social connections:     Talks on phone: Not on file     Gets together: Not on file     Attends [employer] service: Not on file     Active member of club or organization: Not on file     Attends meetings of clubs or organizations: Not on file     Relationship status: Not on file   Other Topics Concern    Not on file   Social History Narrative    Not  on file       History of present illness:  Follow-up fracture right hip. He had fallen down some stairs and had an intertroch fracture right hip did an ORIF with a DHS hip plate 2 weeks ago.  He also injured his back as he went down is having some trouble with his right lower rib cage as well. He was noted to have new onset atrial fib at the time of hospitalization and has been on Eliquis.  The rate has been good.  Review of Systems:    Musculoskeletal:  See HPI      Objective:        Physical Examination:    Vital Signs:    Vitals:    01/06/20 1120   BP: 120/84   Pulse: 77       Body mass index is 34.01 kg/m².    This a well-developed, well nourished patient in no acute distress.  They are alert and oriented and cooperative to examination.        So physical exam shows the right hip he has some swelling a lower leg and ecchymosis in the knee in below the knee. The wound right hip looks fine he is able to move the leg around.  He has significant ecchymosis and tenderness in the lower border of the rib cage on the right.  He does not have tenderness midline lumbar.  Pertinent New Results:    XRAY Report / Interpretation:   AP x-ray right hip shows a intertroch fracture of the right hip lined up nicely. There is a DHS hip screw holding things in place with good placement of the hip screw. AP lateral lumbar shows no significant degenerative changes and no acute changes in the lumbar spine. We have right rib cage which shows evidence for a fracture lower portion of rib 10 which is appears to be acute.  No callus formation.  No significant step-off.  No evidence for pleural effusion Electronically Signed By Todd Andrews JR, MD    Assessment/Plan:      So impression is if follow-up but he ORIF of the hip he is partial weight-bearing with his walker.  Sutures are out.  He still has atrial fib and on physical exam his rate is about 80 he does have an irregular heart rate to rule going to continue with Eliquis still he can  be evaluated later in the month by Cardiology.  We are going to continue with Flexeril I would add a rib belt for the fractured rib.  He is going to continue a partial weight-bearing with a walker.  Not fit for duty.  Percocet 10 for pain.  Back in 2 more weeks.    This note was created using Dragon voice recognition software that occasionally misinterpreted phrases or words.

## 2020-01-20 ENCOUNTER — OFFICE VISIT (OUTPATIENT)
Dept: ORTHOPEDICS | Facility: CLINIC | Age: 60
End: 2020-01-20
Payer: COMMERCIAL

## 2020-01-20 VITALS
SYSTOLIC BLOOD PRESSURE: 129 MMHG | HEART RATE: 90 BPM | WEIGHT: 230 LBS | HEIGHT: 70 IN | BODY MASS INDEX: 32.93 KG/M2 | DIASTOLIC BLOOD PRESSURE: 88 MMHG

## 2020-01-20 DIAGNOSIS — Z98.890 S/P ORIF (OPEN REDUCTION INTERNAL FIXATION) FRACTURE: Primary | ICD-10-CM

## 2020-01-20 DIAGNOSIS — S72.001D CLOSED FRACTURE OF NECK OF RIGHT FEMUR WITH ROUTINE HEALING, SUBSEQUENT ENCOUNTER: ICD-10-CM

## 2020-01-20 DIAGNOSIS — Z87.81 S/P ORIF (OPEN REDUCTION INTERNAL FIXATION) FRACTURE: Primary | ICD-10-CM

## 2020-01-20 PROCEDURE — 99024 PR POST-OP FOLLOW-UP VISIT: ICD-10-PCS | Mod: S$GLB,,, | Performed by: ORTHOPAEDIC SURGERY

## 2020-01-20 PROCEDURE — 99024 POSTOP FOLLOW-UP VISIT: CPT | Mod: S$GLB,,, | Performed by: ORTHOPAEDIC SURGERY

## 2020-01-20 RX ORDER — OXYCODONE AND ACETAMINOPHEN 7.5; 325 MG/1; MG/1
1 TABLET ORAL EVERY 6 HOURS PRN
Qty: 28 TABLET | Refills: 0 | Status: SHIPPED | OUTPATIENT
Start: 2020-01-20 | End: 2020-02-17 | Stop reason: SDUPTHER

## 2020-01-20 NOTE — PROGRESS NOTES
Formerly [facility] ORTHOPEDICS POST-OP NOTE    Subjective:           Chief Complaint:   Chief Complaint   Patient presents with    Right Hip - Post-op Evaluation     ORIF right hip/femoral neck 12.22.19. States that he is starting to feel better, still has some pain but not as bad as last time.        Past Medical History:   Diagnosis Date    GERD (gastroesophageal reflux disease)     Hyperlipidemia     Hypertension     Kidney stone        Past Surgical History:   Procedure Laterality Date    KNEE SURGERY      OPEN REDUCTION AND INTERNAL FIXATION (ORIF) OF INJURY OF HIP Right 12/22/2019    Procedure: ORIF, HIP;  Surgeon: Todd Andrews Jr., MD;  [facility];  Service: Orthopedics;  Laterality: Right;    ROTATOR CUFF REPAIR      SINUS SURGERY         Current Outpatient Medications   Medication Sig    amlodipine-atorvastatin (CADUET) 5-20 mg per tablet Take 1 tablet by mouth once daily.    apixaban (ELIQUIS) 5 mg Tab Take 1 tablet (5 mg total) by mouth 2 (two) times daily.    clotrimazole-betamethasone 1-0.05% (LOTRISONE) cream APPLY TOPICALLY TWICE DAILY (Patient taking differently: as needed. )    docusate sodium (COLACE) 100 MG capsule Take 100 mg by mouth 2 (two) times daily.    fluticasone (FLONASE) 50 mcg/actuation nasal spray 2 sprays (100 mcg total) by Each Nare route once daily.    guaiFENesin (MUCUS RELIEF) 400 mg Tab Take 400 mg by mouth.    MAGNESIUM ORAL Take 400 mg by mouth once.    metoprolol tartrate (LOPRESSOR) 100 MG tablet Take 1 tablet (100 mg total) by mouth 2 (two) times daily.    omeprazole (PRILOSEC OTC) 20 MG tablet Take 1 tablet by mouth once daily.    phenylephrine HCl (SINEX REGULAR NASL) by Nasal route.    potassium chloride SA (K-DUR,KLOR-CON) 10 MEQ tablet TAKE 1 TABLET(10 MEQ) BY MOUTH TWICE DAILY    triamterene-hydrochlorothiazide 37.5-25 mg (MAXZIDE-25) 37.5-25 mg per tablet Take 1 tablet by mouth once daily.    cyclobenzaprine (FLEXERIL) 10 MG tablet Take 1 tablet  (10 mg total) by mouth 3 (three) times daily as needed for Muscle spasms. (Patient not taking: Reported on 2020)    cyclobenzaprine (FLEXERIL) 10 MG tablet Take 1 tablet (10 mg total) by mouth 3 (three) times daily as needed for Muscle spasms. (Patient not taking: Reported on 2020)     No current facility-administered medications for this visit.        Review of patient's allergies indicates:  No Known Allergies    Family History   Problem Relation Age of Onset    Hepatitis Mother     Heart disease Father        Social History     Socioeconomic History    Marital status:      Spouse name: Not on file    Number of children: 0    Years of education: Not on file    Highest education level: Not on file   Occupational History    Occupation: [employer]   Social Needs    Financial resource strain: Not on file    Food insecurity:     Worry: Not on file     Inability: Not on file    Transportation needs:     Medical: Not on file     Non-medical: Not on file   Tobacco Use    Smoking status: Former Smoker     Packs/day: 0.50     Types: Cigarettes     Start date: 1980     Last attempt to quit: 2018     Years since quittin.4    Smokeless tobacco: Never Used    Tobacco comment: Counselled   Substance and Sexual Activity    Alcohol use: Yes     Comment: social    Drug use: No    Sexual activity: Yes     Partners: Female   Lifestyle    Physical activity:     Days per week: Not on file     Minutes per session: Not on file    Stress: Not on file   Relationships    Social connections:     Talks on phone: Not on file     Gets together: Not on file     Attends [employer] service: Not on file     Active member of club or organization: Not on file     Attends meetings of clubs or organizations: Not on file     Relationship status: Not on file   Other Topics Concern    Not on file   Social History Narrative    Not on file       History of present illness:  So he is  only 4 weeks from fracture right hip ORIF and some rib injury.  Ribs are feeling significantly better the swelling in the right lower leg has gone down he still has pain in the right hip he has home health he is on a walker with only 20 lb weight-bearing.    Review of Systems:    Musculoskeletal:  See HPI      Objective:        Physical Examination:    Vital Signs:    Vitals:    01/20/20 0843   BP: 129/88   Pulse: 90       Body mass index is 33 kg/m².    This a well-developed, well nourished patient in no acute distress.  They are alert and oriented and cooperative to examination.        So he has of minimal tenderness right lower rib cage now.  The right hip he still has some pain with range of motion.  And pain with hip flexor because he did fracture the lesser trochanter. He has mild edema below the knee but that edema does go way in the morning.  He does not have a significant Homans.  He does not have point tenderness in the knee.  Pertinent New Results:    XRAY Report / Interpretation:   AP x-ray right hip shows that there is fracture right hip it is comminuted inter troch trochanters greater lesser both fractured but minimal displacement.  There is a DHS hip screw connecting the head and neck to the shaft which is in good position.  It looks like about a half a cm telescoping of the sliding hip screw.  Signature    Assessment/Plan:      So I think the hip is working out the adequately.  He the ribcage is better he is only 4 weeks.  He needs his walker he can only be partial weight-bearing think he is expecting to be further along than this.  Flexeril is not useful anymore.  He does need some pain medicine.  He can do some light work from the house.  At about 6 weeks he may want to be able to get out of the house for the car ago do light activities.  He still needs the walker partial weight-bearing.  See him back in a month    This note was created using Dragon voice recognition software that occasionally  misinterpreted phrases or words.

## 2020-02-10 ENCOUNTER — TELEPHONE (OUTPATIENT)
Dept: ORTHOPEDICS | Facility: CLINIC | Age: 60
End: 2020-02-10

## 2020-02-10 NOTE — TELEPHONE ENCOUNTER
Spoke with pt, he has an appointment Monday but if anything opens sooner he would like to be called

## 2020-02-10 NOTE — TELEPHONE ENCOUNTER
----- Message from Taylor Guerrero sent at 2/10/2020  8:04 AM CST -----  Contact: patient  He wants to possibly come in this Wednesday to see Dr. Andrews because he wants to get off the walker since he has been on it almost 8 weeks, call him back at [phone].

## 2020-02-17 ENCOUNTER — OFFICE VISIT (OUTPATIENT)
Dept: ORTHOPEDICS | Facility: CLINIC | Age: 60
End: 2020-02-17
Payer: COMMERCIAL

## 2020-02-17 VITALS
HEART RATE: 57 BPM | BODY MASS INDEX: 32.93 KG/M2 | WEIGHT: 230 LBS | HEIGHT: 70 IN | SYSTOLIC BLOOD PRESSURE: 116 MMHG | DIASTOLIC BLOOD PRESSURE: 90 MMHG

## 2020-02-17 DIAGNOSIS — Z87.81 S/P ORIF (OPEN REDUCTION INTERNAL FIXATION) FRACTURE: Primary | ICD-10-CM

## 2020-02-17 DIAGNOSIS — S72.001D CLOSED FRACTURE OF NECK OF RIGHT FEMUR WITH ROUTINE HEALING, SUBSEQUENT ENCOUNTER: ICD-10-CM

## 2020-02-17 DIAGNOSIS — Z98.890 S/P ORIF (OPEN REDUCTION INTERNAL FIXATION) FRACTURE: Primary | ICD-10-CM

## 2020-02-17 PROCEDURE — 99024 PR POST-OP FOLLOW-UP VISIT: ICD-10-PCS | Mod: S$GLB,,, | Performed by: ORTHOPAEDIC SURGERY

## 2020-02-17 PROCEDURE — 99024 POSTOP FOLLOW-UP VISIT: CPT | Mod: S$GLB,,, | Performed by: ORTHOPAEDIC SURGERY

## 2020-02-17 RX ORDER — TIZANIDINE 4 MG/1
4 TABLET ORAL NIGHTLY
Qty: 30 TABLET | Refills: 0 | Status: SHIPPED | OUTPATIENT
Start: 2020-02-17 | End: 2020-03-18

## 2020-02-17 RX ORDER — OXYCODONE AND ACETAMINOPHEN 7.5; 325 MG/1; MG/1
1 TABLET ORAL EVERY 6 HOURS PRN
Qty: 28 TABLET | Refills: 0 | Status: SHIPPED | OUTPATIENT
Start: 2020-02-17 | End: 2020-03-16 | Stop reason: SDUPTHER

## 2020-02-17 NOTE — PROGRESS NOTES
Formerly [facility] ORTHOPEDICS POST-OP NOTE    Subjective:           Chief Complaint:   Chief Complaint   Patient presents with    Right Hip - Post-op Evaluation     ORIF right hip 12.22.19. States that his hip is doing good, pain is worse at night, achy.        Past Medical History:   Diagnosis Date    GERD (gastroesophageal reflux disease)     Hyperlipidemia     Hypertension     Kidney stone        Past Surgical History:   Procedure Laterality Date    KNEE SURGERY      OPEN REDUCTION AND INTERNAL FIXATION (ORIF) OF INJURY OF HIP Right 12/22/2019    Procedure: ORIF, HIP;  Surgeon: Todd Andrews Jr., MD;  [facility];  Service: Orthopedics;  Laterality: Right;    ROTATOR CUFF REPAIR      SINUS SURGERY         Current Outpatient Medications   Medication Sig    amlodipine-atorvastatin (CADUET) 5-20 mg per tablet Take 1 tablet by mouth once daily.    apixaban (ELIQUIS) 5 mg Tab Take 1 tablet (5 mg total) by mouth 2 (two) times daily.    clotrimazole-betamethasone 1-0.05% (LOTRISONE) cream APPLY TOPICALLY TWICE DAILY (Patient taking differently: as needed. )    docusate sodium (COLACE) 100 MG capsule Take 100 mg by mouth 2 (two) times daily.    fluticasone (FLONASE) 50 mcg/actuation nasal spray 2 sprays (100 mcg total) by Each Nare route once daily.    MAGNESIUM ORAL Take 400 mg by mouth once.    metoprolol tartrate (LOPRESSOR) 100 MG tablet Take 1 tablet (100 mg total) by mouth 2 (two) times daily.    omeprazole (PRILOSEC OTC) 20 MG tablet Take 1 tablet by mouth once daily.    oxyCODONE-acetaminophen (PERCOCET) 7.5-325 mg per tablet Take 1 tablet by mouth every 6 (six) hours as needed for Pain.    phenylephrine HCl (SINEX REGULAR NASL) by Nasal route.    potassium chloride SA (K-DUR,KLOR-CON) 10 MEQ tablet TAKE 1 TABLET(10 MEQ) BY MOUTH TWICE DAILY    triamterene-hydrochlorothiazide 37.5-25 mg (MAXZIDE-25) 37.5-25 mg per tablet Take 1 tablet by mouth once daily.    cyclobenzaprine (FLEXERIL) 10 MG  tablet Take 1 tablet (10 mg total) by mouth 3 (three) times daily as needed for Muscle spasms. (Patient not taking: Reported on 2020)    guaiFENesin (MUCUS RELIEF) 400 mg Tab Take 400 mg by mouth.     No current facility-administered medications for this visit.        Review of patient's allergies indicates:  No Known Allergies    Family History   Problem Relation Age of Onset    Hepatitis Mother     Heart disease Father        Social History     Socioeconomic History    Marital status:      Spouse name: Not on file    Number of children: 0    Years of education: Not on file    Highest education level: Not on file   Occupational History    Occupation: [employer]   Social Needs    Financial resource strain: Not on file    Food insecurity:     Worry: Not on file     Inability: Not on file    Transportation needs:     Medical: Not on file     Non-medical: Not on file   Tobacco Use    Smoking status: Former Smoker     Packs/day: 0.50     Types: Cigarettes     Start date: 1980     Last attempt to quit: 2018     Years since quittin.5    Smokeless tobacco: Never Used    Tobacco comment: Counselled   Substance and Sexual Activity    Alcohol use: Yes     Comment: social    Drug use: No    Sexual activity: Yes     Partners: Female   Lifestyle    Physical activity:     Days per week: Not on file     Minutes per session: Not on file    Stress: Not on file   Relationships    Social connections:     Talks on phone: Not on file     Gets together: Not on file     Attends Hoahaoism service: Not on file     Active member of club or organization: Not on file     Attends meetings of clubs or organizations: Not on file     Relationship status: Not on file   Other Topics Concern    Not on file   Social History Narrative    Not on file       History of present illness:  Follow-up on right hip fracture. He also had rib fractures and they are all healed up in a  symptomatic'he still has some pain in his right hip has been using the walker.  Were 2 months    Review of Systems:    Musculoskeletal:  See HPI      Objective:        Physical Examination:    Vital Signs:    Vitals:    02/17/20 0757   BP: (!) 116/90   Pulse: (!) 57       Body mass index is 33 kg/m².    This a well-developed, well nourished patient in no acute distress.  They are alert and oriented and cooperative to examination.        Physical exam shows he can stand with no discomfort.  Motion of the hip right no pain. No tenderness around the chest  Pertinent New Results:    XRAY Report / Interpretation:   AP x-ray right hip shows a intertroch fracture right hip there was comminution the lesser troch was fractured .  DHS hip screw in place. No changes in the hip screw or the fracture from x-ray a month ago.  There has been about a cm telescoping of the hip screw.  That is similar to prior x-ray.  Fracture lines are still visible there is callus formation.  Signature    Assessment/Plan:      So he is 2 months hip fracture was comminuted so he has trouble with hip flexors as expected. He can graduate to a cane if it is comfortable.  And partial weight-bearing which she has been doing proceed to full weight-bearing with cane.  But because of the comminution he is going to have some pain with certain activities.  He is working from home he can drive short distances but will have difficulty commuting down town.  As he gets more comfortable he can make some trips down town as tolerated.  He needs another month of healing. Pain medicines renewed.  Occasional months muscle relaxer but not Flexeril which she did not tolerate well.  And we will see him back in a month with another x-ray.  He has a cane issue today    This note was created using Dragon voice recognition software that occasionally misinterpreted phrases or words.

## 2020-03-10 RX ORDER — TRIAMTERENE/HYDROCHLOROTHIAZID 37.5-25 MG
TABLET ORAL
Qty: 90 TABLET | Refills: 3 | Status: SHIPPED | OUTPATIENT
Start: 2020-03-10 | End: 2021-03-01 | Stop reason: SDUPTHER

## 2020-03-13 ENCOUNTER — PATIENT MESSAGE (OUTPATIENT)
Dept: FAMILY MEDICINE | Facility: CLINIC | Age: 60
End: 2020-03-13

## 2020-03-13 DIAGNOSIS — H00.019 HORDEOLUM EXTERNUM, UNSPECIFIED LATERALITY: Primary | ICD-10-CM

## 2020-03-13 RX ORDER — CIPROFLOXACIN HYDROCHLORIDE 3 MG/ML
1 SOLUTION/ DROPS OPHTHALMIC 4 TIMES DAILY
Qty: 5 ML | Refills: 0 | Status: SHIPPED | OUTPATIENT
Start: 2020-03-13 | End: 2020-05-11

## 2020-03-16 ENCOUNTER — OFFICE VISIT (OUTPATIENT)
Dept: ORTHOPEDICS | Facility: CLINIC | Age: 60
End: 2020-03-16
Payer: COMMERCIAL

## 2020-03-16 VITALS
BODY MASS INDEX: 32.93 KG/M2 | DIASTOLIC BLOOD PRESSURE: 70 MMHG | HEIGHT: 70 IN | HEART RATE: 76 BPM | WEIGHT: 230 LBS | SYSTOLIC BLOOD PRESSURE: 120 MMHG

## 2020-03-16 DIAGNOSIS — Z87.81 S/P ORIF (OPEN REDUCTION INTERNAL FIXATION) FRACTURE: Primary | ICD-10-CM

## 2020-03-16 DIAGNOSIS — S72.001D CLOSED FRACTURE OF NECK OF RIGHT FEMUR WITH ROUTINE HEALING, SUBSEQUENT ENCOUNTER: ICD-10-CM

## 2020-03-16 DIAGNOSIS — Z98.890 S/P ORIF (OPEN REDUCTION INTERNAL FIXATION) FRACTURE: Primary | ICD-10-CM

## 2020-03-16 PROCEDURE — 99024 PR POST-OP FOLLOW-UP VISIT: ICD-10-PCS | Mod: S$GLB,,, | Performed by: ORTHOPAEDIC SURGERY

## 2020-03-16 PROCEDURE — 99024 POSTOP FOLLOW-UP VISIT: CPT | Mod: S$GLB,,, | Performed by: ORTHOPAEDIC SURGERY

## 2020-03-16 RX ORDER — OXYCODONE AND ACETAMINOPHEN 7.5; 325 MG/1; MG/1
1 TABLET ORAL EVERY 6 HOURS PRN
Qty: 28 TABLET | Refills: 0 | Status: SHIPPED | OUTPATIENT
Start: 2020-03-16 | End: 2020-04-13 | Stop reason: SDUPTHER

## 2020-03-16 NOTE — PROGRESS NOTES
[facility] ORTHOPEDICS POST-OP NOTE    Subjective:           Chief Complaint:   Chief Complaint   Patient presents with    Right Hip - Post-op Evaluation     ORIF Right hip  12.22.19. State that his hip is feeling better, still has some bad days.       Past Medical History:   Diagnosis Date    GERD (gastroesophageal reflux disease)     Hyperlipidemia     Hypertension     Kidney stone        Past Surgical History:   Procedure Laterality Date    KNEE SURGERY      OPEN REDUCTION AND INTERNAL FIXATION (ORIF) OF INJURY OF HIP Right 12/22/2019    Procedure: ORIF, HIP;  Surgeon: Todd Andrews Jr., MD;  Location: [facility];  Service: Orthopedics;  Laterality: Right;    ROTATOR CUFF REPAIR      SINUS SURGERY         Current Outpatient Medications   Medication Sig    amlodipine-atorvastatin (CADUET) 5-20 mg per tablet Take 1 tablet by mouth once daily.    apixaban (ELIQUIS) 5 mg Tab Take 1 tablet (5 mg total) by mouth 2 (two) times daily.    ciprofloxacin HCl (CILOXAN) 0.3 % ophthalmic solution Place 1 drop into both eyes 4 (four) times daily.    clotrimazole-betamethasone 1-0.05% (LOTRISONE) cream APPLY TOPICALLY TWICE DAILY (Patient taking differently: as needed. )    docusate sodium (COLACE) 100 MG capsule Take 100 mg by mouth 2 (two) times daily.    fluticasone (FLONASE) 50 mcg/actuation nasal spray 2 sprays (100 mcg total) by Each Nare route once daily.    guaiFENesin (MUCUS RELIEF) 400 mg Tab Take 400 mg by mouth.    MAGNESIUM ORAL Take 400 mg by mouth once.    metoprolol tartrate (LOPRESSOR) 100 MG tablet Take 1 tablet (100 mg total) by mouth 2 (two) times daily.    omeprazole (PRILOSEC OTC) 20 MG tablet Take 1 tablet by mouth once daily.    oxyCODONE-acetaminophen (PERCOCET) 7.5-325 mg per tablet Take 1 tablet by mouth every 6 (six) hours as needed for Pain.    phenylephrine HCl (SINEX REGULAR NASL) by Nasal route.    potassium chloride SA (K-DUR,KLOR-CON) 10 MEQ tablet TAKE 1 TABLET(10 MEQ) BY  MOUTH TWICE DAILY    tiZANidine (ZANAFLEX) 4 MG tablet Take 1 tablet (4 mg total) by mouth every evening.    triamterene-hydrochlorothiazide 37.5-25 mg (MAXZIDE-25) 37.5-25 mg per tablet TAKE 1 TABLET BY MOUTH EVERY DAY     No current facility-administered medications for this visit.        Review of patient's allergies indicates:  No Known Allergies    Family History   Problem Relation Age of Onset    Hepatitis Mother     Heart disease Father        Social History     Socioeconomic History    Marital status:      Spouse name: Not on file    Number of children: 0    Years of education: Not on file    Highest education level: Not on file   Occupational History    Occupation: [employer]   Social Needs    Financial resource strain: Not on file    Food insecurity:     Worry: Not on file     Inability: Not on file    Transportation needs:     Medical: Not on file     Non-medical: Not on file   Tobacco Use    Smoking status: Former Smoker     Packs/day: 0.50     Types: Cigarettes     Start date: 1980     Last attempt to quit: 2018     Years since quittin.6    Smokeless tobacco: Never Used    Tobacco comment: Counselled   Substance and Sexual Activity    Alcohol use: Yes     Comment: social    Drug use: No    Sexual activity: Yes     Partners: Female   Lifestyle    Physical activity:     Days per week: Not on file     Minutes per session: Not on file    Stress: Not on file   Relationships    Social connections:     Talks on phone: Not on file     Gets together: Not on file     Attends [employer] service: Not on file     Active member of club or organization: Not on file     Attends meetings of clubs or organizations: Not on file     Relationship status: Not on file   Other Topics Concern    Not on file   Social History Narrative    Not on file       History of present illness almost 3 months follow-up ORIF right hip fracture.  He has got rid of the cane he has  improved pain in the right hip other aches and pains are all gone he still is limited in how far he can walk because of right hip pain.  But slowly things are improving.  He is working from home    Review of Systems:    Musculoskeletal:  See HPI      Objective:        Physical Examination:    Vital Signs:    Vitals:    03/16/20 0818   BP: 120/70   Pulse: 76       Body mass index is 33 kg/m².    This a well-developed, well nourished patient in no acute distress.  They are alert and oriented and cooperative to examination.        Physical exam shows very minimal Trendelenburg type gait mild tenderness over the trochanter right hip.  Good range of motion right hip.  Pertinent New Results:    XRAY Report / Interpretation:   AP x-ray right hip shows the ORIF with a DHS hip screw we had the comminuted fracture of greater trochanter looks like a separate fragment to the lesser trochanter looks like a separate fragment of the head neck of configuration is healing up very nicely cannot see the fracture line there has been some collapse of the hip screw but similar to prior x-rays.  There appears to be in improved callus around the slightly displaced lesser trochanter.  Electronically Signed By Todd Andrews JR, MD    Assessment/Plan:      So my impression he is healing he is getting more functional probably does not need the cane at this point I do not think he has 100% healed yet he is going to continue light duty takes occasional pain medicine was refilled see him back in another month.  I would like to see all the limp go way which would indicate complete healing.    This note was created using Dragon voice recognition software that occasionally misinterpreted phrases or words.

## 2020-03-18 NOTE — TELEPHONE ENCOUNTER
Pt. stated he picked up eye drops & the next day his eyes were better. He is still using them but he said thank you!

## 2020-03-22 ENCOUNTER — PATIENT MESSAGE (OUTPATIENT)
Dept: ORTHOPEDICS | Facility: CLINIC | Age: 60
End: 2020-03-22

## 2020-03-22 ENCOUNTER — PATIENT MESSAGE (OUTPATIENT)
Dept: FAMILY MEDICINE | Facility: CLINIC | Age: 60
End: 2020-03-22

## 2020-03-23 ENCOUNTER — TELEPHONE (OUTPATIENT)
Dept: ORTHOPEDICS | Facility: CLINIC | Age: 60
End: 2020-03-23

## 2020-03-23 NOTE — TELEPHONE ENCOUNTER
----- Message from Keena Contreras sent at 3/23/2020 12:26 PM CDT -----  Contact: Candie ( Wife)  Patient wife called to find out if her  can get a celebrex or mobic. Patient still in pain. Please call [phone]

## 2020-04-13 ENCOUNTER — HOSPITAL ENCOUNTER (OUTPATIENT)
Dept: RADIOLOGY | Facility: HOSPITAL | Age: 60
Discharge: HOME | End: 2020-04-13
Attending: ORTHOPAEDIC SURGERY
Payer: COMMERCIAL

## 2020-04-13 ENCOUNTER — OFFICE VISIT (OUTPATIENT)
Dept: ORTHOPEDICS | Facility: CLINIC | Age: 60
End: 2020-04-13
Payer: COMMERCIAL

## 2020-04-13 VITALS
HEART RATE: 69 BPM | HEIGHT: 70 IN | WEIGHT: 230 LBS | BODY MASS INDEX: 32.93 KG/M2 | SYSTOLIC BLOOD PRESSURE: 129 MMHG | DIASTOLIC BLOOD PRESSURE: 91 MMHG

## 2020-04-13 DIAGNOSIS — Z98.890 S/P ORIF (OPEN REDUCTION INTERNAL FIXATION) FRACTURE: Primary | ICD-10-CM

## 2020-04-13 DIAGNOSIS — Z98.890 S/P ORIF (OPEN REDUCTION INTERNAL FIXATION) FRACTURE: ICD-10-CM

## 2020-04-13 DIAGNOSIS — S72.001K CLOSED FRACTURE OF NECK OF RIGHT FEMUR WITH NONUNION, SUBSEQUENT ENCOUNTER: ICD-10-CM

## 2020-04-13 DIAGNOSIS — Z87.81 S/P ORIF (OPEN REDUCTION INTERNAL FIXATION) FRACTURE: ICD-10-CM

## 2020-04-13 DIAGNOSIS — Z87.81 S/P ORIF (OPEN REDUCTION INTERNAL FIXATION) FRACTURE: Primary | ICD-10-CM

## 2020-04-13 PROCEDURE — 3080F PR MOST RECENT DIASTOLIC BLOOD PRESSURE >= 90 MM HG: ICD-10-PCS | Mod: S$GLB,,, | Performed by: ORTHOPAEDIC SURGERY

## 2020-04-13 PROCEDURE — 73700 CT LOWER EXTREMITY W/O DYE: CPT | Mod: TC,PO,RT

## 2020-04-13 PROCEDURE — 3008F BODY MASS INDEX DOCD: CPT | Mod: S$GLB,,, | Performed by: ORTHOPAEDIC SURGERY

## 2020-04-13 PROCEDURE — 99213 OFFICE O/P EST LOW 20 MIN: CPT | Mod: S$GLB,,, | Performed by: ORTHOPAEDIC SURGERY

## 2020-04-13 PROCEDURE — 3080F DIAST BP >= 90 MM HG: CPT | Mod: S$GLB,,, | Performed by: ORTHOPAEDIC SURGERY

## 2020-04-13 PROCEDURE — 3074F PR MOST RECENT SYSTOLIC BLOOD PRESSURE < 130 MM HG: ICD-10-PCS | Mod: S$GLB,,, | Performed by: ORTHOPAEDIC SURGERY

## 2020-04-13 PROCEDURE — 3074F SYST BP LT 130 MM HG: CPT | Mod: S$GLB,,, | Performed by: ORTHOPAEDIC SURGERY

## 2020-04-13 PROCEDURE — 99213 PR OFFICE/OUTPT VISIT, EST, LEVL III, 20-29 MIN: ICD-10-PCS | Mod: S$GLB,,, | Performed by: ORTHOPAEDIC SURGERY

## 2020-04-13 PROCEDURE — 3008F PR BODY MASS INDEX (BMI) DOCUMENTED: ICD-10-PCS | Mod: S$GLB,,, | Performed by: ORTHOPAEDIC SURGERY

## 2020-04-13 RX ORDER — OXYCODONE AND ACETAMINOPHEN 7.5; 325 MG/1; MG/1
1 TABLET ORAL EVERY 6 HOURS PRN
Qty: 28 TABLET | Refills: 0 | Status: SHIPPED | OUTPATIENT
Start: 2020-04-13 | End: 2020-04-20

## 2020-04-13 NOTE — PROGRESS NOTES
[facility] ORTHOPEDICS    Subjective:     Chief Complaint:   Chief Complaint   Patient presents with    Right Hip - Post-op Evaluation     ORIF Rt Hip 12/22/2019. Walking with limp/ still having pain. Requesting refill on pain medication today. Doing PT at home       Past Medical History:   Diagnosis Date    GERD (gastroesophageal reflux disease)     Hyperlipidemia     Hypertension     Kidney stone        Past Surgical History:   Procedure Laterality Date    KNEE SURGERY      OPEN REDUCTION AND INTERNAL FIXATION (ORIF) OF INJURY OF HIP Right 12/22/2019    Procedure: ORIF, HIP;  Surgeon: Todd Andrews Jr., MD;  Location: [facility];  Service: Orthopedics;  Laterality: Right;    ROTATOR CUFF REPAIR      SINUS SURGERY         Current Outpatient Medications   Medication Sig    amlodipine-atorvastatin (CADUET) 5-20 mg per tablet Take 1 tablet by mouth once daily.    apixaban (ELIQUIS) 5 mg Tab Take 1 tablet (5 mg total) by mouth 2 (two) times daily.    ciprofloxacin HCl (CILOXAN) 0.3 % ophthalmic solution Place 1 drop into both eyes 4 (four) times daily.    docusate sodium (COLACE) 100 MG capsule Take 100 mg by mouth 2 (two) times daily.    fluticasone (FLONASE) 50 mcg/actuation nasal spray 2 sprays (100 mcg total) by Each Nare route once daily.    guaiFENesin (MUCUS RELIEF) 400 mg Tab Take 400 mg by mouth.    MAGNESIUM ORAL Take 400 mg by mouth once.    metoprolol tartrate (LOPRESSOR) 100 MG tablet Take 1 tablet (100 mg total) by mouth 2 (two) times daily.    omeprazole (PRILOSEC OTC) 20 MG tablet Take 1 tablet by mouth once daily.    oxyCODONE-acetaminophen (PERCOCET) 7.5-325 mg per tablet Take 1 tablet by mouth every 6 (six) hours as needed for Pain.    phenylephrine HCl (SINEX REGULAR NASL) by Nasal route.    potassium chloride SA (K-DUR,KLOR-CON) 10 MEQ tablet TAKE 1 TABLET(10 MEQ) BY MOUTH TWICE DAILY    triamterene-hydrochlorothiazide 37.5-25 mg (MAXZIDE-25) 37.5-25 mg per tablet TAKE 1 TABLET  BY MOUTH EVERY DAY     No current facility-administered medications for this visit.        Review of patient's allergies indicates:  No Known Allergies    Family History   Problem Relation Age of Onset    Hepatitis Mother     Heart disease Father        Social History     Socioeconomic History    Marital status:      Spouse name: Not on file    Number of children: 0    Years of education: Not on file    Highest education level: Not on file   Occupational History    Occupation: [employer]   Social Needs    Financial resource strain: Not on file    Food insecurity:     Worry: Not on file     Inability: Not on file    Transportation needs:     Medical: Not on file     Non-medical: Not on file   Tobacco Use    Smoking status: Former Smoker     Packs/day: 0.50     Types: Cigarettes     Start date: 1980     Last attempt to quit: 2018     Years since quittin.7    Smokeless tobacco: Never Used    Tobacco comment: Counselled   Substance and Sexual Activity    Alcohol use: Yes     Comment: social    Drug use: No    Sexual activity: Yes     Partners: Female   Lifestyle    Physical activity:     Days per week: Not on file     Minutes per session: Not on file    Stress: Not on file   Relationships    Social connections:     Talks on phone: Not on file     Gets together: Not on file     Attends [employer] service: Not on file     Active member of club or organization: Not on file     Attends meetings of clubs or organizations: Not on file     Relationship status: Not on file   Other Topics Concern    Not on file   Social History Narrative    Not on file       History of present illness:  Follow-up for right hip fracture.  We are about 4 months.  His walking has improved some but he still have some mild Trendelenburg gait right side.  Does not have back pain.  Cannot walk around the block without lot of pain.  He can bicycle for a few miles.  Overall somewhat  better      Review of Systems:    Constitution: Negative for chills, fever, and sweats.  Negative for unexplained weight loss.    HENT:  Negative for headaches and blurry vision.    Cardiovascular:Negative for chest pain or irregular heart beat. Negative for hypertension.    Respiratory:  Negative for cough and shortness of breath.    Gastrointestinal: Negative for abdominal pain, heartburn, melena, nausea, and vomitting.    Genitourinary:  Negative bladder incontinence and dysuria.    Musculoskeletal:  See HPI for details.     Neurological: Negative for numbness.    Psychiatric/Behavioral: Negative for depression.  The patient is not nervous/anxious.      Endocrine: Negative for polyuria    Hematologic/Lymphatic: Negative for bleeding problem.  Does not bruise/bleed easily.    Skin: Negative for poor would healing and rash    Objective:      Physical Examination:    Vital Signs:    Vitals:    04/13/20 0817   BP: (!) 129/91   Pulse: 69       Body mass index is 33 kg/m².    This a well-developed, well nourished patient in no acute distress.  They are alert and oriented and cooperative to examination.        Physical exam shows he is not particularly tender over the trochanter.  He does have a mild skin rash that looks like a tenia of bursa Jose up of her a fungus infection in the skin he has tried treatment on and may need see dermatology for this the 3 cm lesion in the hip area.  When he walks he has a very minimal Trendelenburg gait but he can stand up single 4 of single leg on each side and can get out of a chair without much difficulty his gait is approaching normal.  Pertinent New Results:    XRAY Report / Interpretation:   AP x-ray right hip shows intertrochanteric fracture the DHS hip screw.  The greater trochanter of fracture area appears to be healed the lesser trochanter fracture region appears to be more healed than prior x-ray.  There is no change in the hardware.  There has been some telescoping of the  hip screw initially but no change in the last 3 x-rays.  Overall appears to be interval healing with good alignment of the hip fracture.  Electronically Signed By Todd Andrews JR, MD    Assessment/Plan:      So he still has a little more symptoms than I thought he may have.  He is stronger.  I would do a CT scan to make sure he has a proper bony union at this time.  Work is doing light duty.  He still needs a pain medicine in the afternoon after activity so we renewed that for now.  Other injuries are doing well as other parts are doing well.  See him back in another month.      This note was created using Dragon voice recognition software that occasionally misinterpreted phrases or words.

## 2020-04-15 ENCOUNTER — TELEPHONE (OUTPATIENT)
Dept: ORTHOPEDICS | Facility: CLINIC | Age: 60
End: 2020-04-15

## 2020-04-20 RX ORDER — AMLODIPINE BESYLATE AND ATORVASTATIN CALCIUM 5; 20 MG/1; MG/1
1 TABLET, FILM COATED ORAL DAILY
Qty: 90 TABLET | Refills: 0 | Status: SHIPPED | OUTPATIENT
Start: 2020-04-20 | End: 2020-05-04 | Stop reason: ALTCHOICE

## 2020-04-22 RX ORDER — METOPROLOL TARTRATE 100 MG/1
100 TABLET ORAL 2 TIMES DAILY
Qty: 120 TABLET | Refills: 0 | Status: SHIPPED | OUTPATIENT
Start: 2020-04-22 | End: 2020-06-30 | Stop reason: SDUPTHER

## 2020-05-04 ENCOUNTER — OFFICE VISIT (OUTPATIENT)
Dept: FAMILY MEDICINE | Facility: CLINIC | Age: 60
End: 2020-05-04
Payer: COMMERCIAL

## 2020-05-04 ENCOUNTER — PATIENT MESSAGE (OUTPATIENT)
Dept: FAMILY MEDICINE | Facility: CLINIC | Age: 60
End: 2020-05-04

## 2020-05-04 VITALS
BODY MASS INDEX: 32.21 KG/M2 | SYSTOLIC BLOOD PRESSURE: 117 MMHG | WEIGHT: 225 LBS | DIASTOLIC BLOOD PRESSURE: 78 MMHG | HEIGHT: 70 IN | HEART RATE: 75 BPM

## 2020-05-04 DIAGNOSIS — I10 BENIGN ESSENTIAL HYPERTENSION: Primary | ICD-10-CM

## 2020-05-04 DIAGNOSIS — I48.0 PAROXYSMAL ATRIAL FIBRILLATION: ICD-10-CM

## 2020-05-04 DIAGNOSIS — E78.49 FAMILIAL COMBINED HYPERLIPIDEMIA: ICD-10-CM

## 2020-05-04 DIAGNOSIS — K21.9 GASTROESOPHAGEAL REFLUX DISEASE WITHOUT ESOPHAGITIS: ICD-10-CM

## 2020-05-04 DIAGNOSIS — R21 RASH AND NONSPECIFIC SKIN ERUPTION: ICD-10-CM

## 2020-05-04 DIAGNOSIS — Z79.01 ANTICOAGULATED: ICD-10-CM

## 2020-05-04 PROCEDURE — 3078F DIAST BP <80 MM HG: CPT | Mod: ,,, | Performed by: INTERNAL MEDICINE

## 2020-05-04 PROCEDURE — 3078F PR MOST RECENT DIASTOLIC BLOOD PRESSURE < 80 MM HG: ICD-10-PCS | Mod: ,,, | Performed by: INTERNAL MEDICINE

## 2020-05-04 PROCEDURE — 3074F PR MOST RECENT SYSTOLIC BLOOD PRESSURE < 130 MM HG: ICD-10-PCS | Mod: ,,, | Performed by: INTERNAL MEDICINE

## 2020-05-04 PROCEDURE — 99214 OFFICE O/P EST MOD 30 MIN: CPT | Mod: 95,,, | Performed by: INTERNAL MEDICINE

## 2020-05-04 PROCEDURE — 3074F SYST BP LT 130 MM HG: CPT | Mod: ,,, | Performed by: INTERNAL MEDICINE

## 2020-05-04 PROCEDURE — 99214 PR OFFICE/OUTPT VISIT, EST, LEVL IV, 30-39 MIN: ICD-10-PCS | Mod: 95,,, | Performed by: INTERNAL MEDICINE

## 2020-05-04 RX ORDER — CLOBETASOL PROPIONATE 0.5 MG/G
CREAM TOPICAL 2 TIMES DAILY
Qty: 15 G | Refills: 1 | Status: SHIPPED | OUTPATIENT
Start: 2020-05-04 | End: 2020-06-03

## 2020-05-04 RX ORDER — AMLODIPINE BESYLATE AND ATORVASTATIN CALCIUM 5; 20 MG/1; MG/1
1 TABLET, FILM COATED ORAL DAILY
Qty: 90 TABLET | Refills: 0
Start: 2020-05-04 | End: 2020-07-16 | Stop reason: SDUPTHER

## 2020-05-04 NOTE — PROGRESS NOTES
Subjective:      Chief Complaint   Patient presents with    Hypertension    Hyperlipidemia    Atrial Fibrillation    Recent Hip Fracture    Rash    Gastroesophageal Reflux       The chief complaint leading to consultation is: HTN, Lipids  The patient location is:  Home  Visit type: Virtual visit with synchronous audio/video or audio only  This was a video visit in lieu of in-person visit due to the coronavirus emergency. Patient acknowledged and consented to the video visit encounter.     Virtual consultation for patient Mr. Howard Segura who is a 59-year-old  gentleman.  The reason for virtual consultation is currently prevailing COVID-19 pandemic.    New updates in his medical situation include a recent fall which led to a fracture neck femur.  He had to have a open reduction and internal fixation with screws and rods.  He is recovering slowly but steadily from this procedure and it has been about 4 months.    Postoperatively he had also developed atrial fibrillation which seems to be stable at this point.  He is currently on Eliquis for anticoagulation.  He also takes metoprolol.  He is supposed to follow-up with Dr. Richter.    Social history indicates that he is mostly working out from home at his [employer] job.    Thankfully he has quit smoking and remains so.    His blood pressures at home are doing okay.    He is also on anticoagulation with Eliquis and does not seem to have any side effects like bleeding.    He has experienced a rash on the right lateral hip and thigh.  He has applied mid potency and low potency steroids without benefit.  No significant pain or itching.  No discharge.    Hypertension   This is a chronic problem. The current episode started more than 1 year ago. The problem is controlled. Pertinent negatives include no chest pain, headaches, neck pain, palpitations or shortness of breath. Risk factors for coronary artery disease include sedentary  lifestyle, obesity, male gender and dyslipidemia. Past treatments include calcium channel blockers, beta blockers and diuretics. The current treatment provides moderate improvement.   Hyperlipidemia   This is a chronic problem. The current episode started more than 1 year ago. The problem is controlled. Exacerbating diseases include obesity. Pertinent negatives include no chest pain or shortness of breath. Current antihyperlipidemic treatment includes statins. The current treatment provides moderate improvement of lipids. Compliance problems include psychosocial issues.  Risk factors for coronary artery disease include hypertension, male sex, obesity and dyslipidemia.   Atrial Fibrillation   Presents for follow-up visit. Symptoms include hypertension. Symptoms are negative for an AICD problem, bradycardia, chest pain, dizziness, hemodynamic instability, hypotension, palpitations, shortness of breath, syncope, tachycardia and weakness. The symptoms have been stable. Past medical history includes atrial fibrillation and hyperlipidemia.   Rash   This is a new problem. The current episode started 1 to 4 weeks ago. The problem is unchanged. The affected locations include the right hip and right upper leg. The rash is characterized by redness and dryness. Associated with: Metallic hardware secondary to hip. Pertinent negatives include no anorexia, cough, diarrhea, fatigue, rhinorrhea, shortness of breath or vomiting.   Gastroesophageal Reflux   He reports no abdominal pain, no chest pain, no choking, no coughing or no wheezing. This is a chronic problem. Pertinent negatives include no fatigue, melena or muscle weakness. Risk factors include obesity (Patient quit smoking in past.  He is on anticoagulation also.). The treatment provided moderate relief.       Past Surgical History:   Procedure Laterality Date    KNEE SURGERY      OPEN REDUCTION AND INTERNAL FIXATION (ORIF) OF INJURY OF HIP Right 12/22/2019    Procedure:  ORIF, HIP;  Surgeon: Todd Andrews Jr., MD;  [facility];  Service: Orthopedics;  Laterality: Right;    ROTATOR CUFF REPAIR      SINUS SURGERY       Past Medical History:   Diagnosis Date    GERD (gastroesophageal reflux disease)     Hyperlipidemia     Hypertension     Kidney stone      Family History   Problem Relation Age of Onset    Hepatitis Mother     Heart disease Father         Social History:   Marital Status:   Alcohol History:  reports that he drinks alcohol.  Tobacco History:  reports that he quit smoking about 21 months ago. His smoking use included cigarettes. He started smoking about 40 years ago. He smoked 0.50 packs per day. He has never used smokeless tobacco.  Drug History:  reports that he does not use drugs.    Review of patient's allergies indicates:  No Known Allergies    Current Outpatient Medications   Medication Sig Dispense Refill    amlodipine-atorvastatin (CADUET) 5-20 mg per tablet Take 1 tablet by mouth once daily. 90 tablet 0    apixaban (ELIQUIS) 5 mg Tab Take 1 tablet (5 mg total) by mouth 2 (two) times daily. 60 tablet 0    ciprofloxacin HCl (CILOXAN) 0.3 % ophthalmic solution Place 1 drop into both eyes 4 (four) times daily. 5 mL 0    clobetasoL (TEMOVATE) 0.05 % cream Apply topically 2 (two) times daily. 15 g 1    docusate sodium (COLACE) 100 MG capsule Take 100 mg by mouth 2 (two) times daily.      fluticasone (FLONASE) 50 mcg/actuation nasal spray 2 sprays (100 mcg total) by Each Nare route once daily. 3 Bottle 2    guaiFENesin (MUCUS RELIEF) 400 mg Tab Take 400 mg by mouth.      MAGNESIUM ORAL Take 400 mg by mouth once.      metoprolol tartrate (LOPRESSOR) 100 MG tablet Take 1 tablet (100 mg total) by mouth 2 (two) times daily. 120 tablet 0    omeprazole (PRILOSEC OTC) 20 MG tablet Take 1 tablet by mouth once daily.      phenylephrine HCl (SINEX REGULAR NASL) by Nasal route.      potassium chloride SA (K-DUR,KLOR-CON) 10 MEQ tablet TAKE 1  "TABLET(10 MEQ) BY MOUTH TWICE DAILY 180 tablet 1    triamterene-hydrochlorothiazide 37.5-25 mg (MAXZIDE-25) 37.5-25 mg per tablet TAKE 1 TABLET BY MOUTH EVERY DAY 90 tablet 3     No current facility-administered medications for this visit.        Review of Systems   Constitutional: Negative for activity change, chills, diaphoresis, fatigue and unexpected weight change (wt gain after quitting smoking).   HENT: Negative for hearing loss, rhinorrhea and trouble swallowing.    Eyes: Negative for discharge and visual disturbance.   Respiratory: Negative for cough, choking, chest tightness, shortness of breath and wheezing.    Cardiovascular: Negative for chest pain, palpitations and syncope.        HTN/dyslipidemia   Gastrointestinal: Negative for abdominal distention, abdominal pain, anorexia, blood in stool, constipation, diarrhea, melena and vomiting.        GERD better   Endocrine: Negative for polydipsia and polyuria.   Genitourinary: Negative for difficulty urinating, hematuria and urgency.   Musculoskeletal: Negative for arthralgias, joint swelling, muscle weakness and neck pain.   Skin: Positive for rash.   Neurological: Negative for dizziness, weakness and headaches.   Psychiatric/Behavioral: Negative for confusion and dysphoric mood.         Objective:      Vitals:    05/04/20 1442   BP: 117/78   Pulse: 75   Weight: 102.1 kg (225 lb)   Height: 5' 10" (1.778 m)     Physical Exam:   Physical Exam   Constitutional: He appears well-developed and well-nourished. No distress.   Musculoskeletal: He exhibits no deformity.   Status post right hip surgery.  The incision appears to be good on the video screen.   Neurological: He is alert.   Skin: Rash noted.   Rash noted on patient thigh and hip region.   Psychiatric: He has a normal mood and affect. His behavior is normal.            Assessment:       1. Benign essential hypertension    2. Familial combined hyperlipidemia    3. Paroxysmal atrial fibrillation    4. " Anticoagulated    5. Rash and nonspecific skin eruption    6. Gastroesophageal reflux disease without esophagitis      Plan:   Benign essential hypertension  -     amlodipine-atorvastatin (CADUET) 5-20 mg per tablet; Take 1 tablet by mouth once daily.  Dispense: 90 tablet; Refill: 0    Familial combined hyperlipidemia  -     amlodipine-atorvastatin (CADUET) 5-20 mg per tablet; Take 1 tablet by mouth once daily.  Dispense: 90 tablet; Refill: 0    Paroxysmal atrial fibrillation    Anticoagulated    Rash and nonspecific skin eruption  -     clobetasoL (TEMOVATE) 0.05 % cream; Apply topically 2 (two) times daily.  Dispense: 15 g; Refill: 1    Gastroesophageal reflux disease without esophagitis      Follow up in about 4 months (around 9/4/2020) for Hypertension/lipids.  Patient's blood pressures are doing okay at this point.  Medications have been reviewed.    New diagnosis is atrial fibrillation and he is on anticoagulation with addition of metoprolol.  He seems to be maintaining a stable rhythm as per his determination.  No shortness of breath or palpitations.  Chronic anticoagulation precautions discussed including injury and avoiding sharps.    New rash has been noted which has not responded to mid and low potency steroids.  I will give a trial of time await which is high potency steroid.  He may want to consider also using [employer] coupon for discounted rates.    Reflux seems to be stable with omeprazole.  Total time spent with patient: 24-26 mts    Each patient to whom he or she provides medical services by telemedicine is:  (1) informed of the relationship between the physician and patient and the respective role of any other health care provider with respect to management of the patient; and (2) notified that he or she may decline to receive medical services by telemedicine and may withdraw from such care at any time.    This note was created using [facility] voice recognition software that occasionally  misinterprets phrases or words.

## 2020-05-11 ENCOUNTER — OFFICE VISIT (OUTPATIENT)
Dept: ORTHOPEDICS | Facility: CLINIC | Age: 60
End: 2020-05-11
Payer: COMMERCIAL

## 2020-05-11 VITALS
HEIGHT: 70 IN | SYSTOLIC BLOOD PRESSURE: 124 MMHG | BODY MASS INDEX: 32.21 KG/M2 | WEIGHT: 225 LBS | HEART RATE: 62 BPM | DIASTOLIC BLOOD PRESSURE: 78 MMHG

## 2020-05-11 DIAGNOSIS — Z98.890 HISTORY OF OPEN REDUCTION AND INTERNAL FIXATION (ORIF) PROCEDURE: ICD-10-CM

## 2020-05-11 DIAGNOSIS — M75.41 IMPINGEMENT SYNDROME OF SHOULDER, RIGHT: ICD-10-CM

## 2020-05-11 DIAGNOSIS — S72.001K CLOSED FRACTURE OF NECK OF RIGHT FEMUR WITH NONUNION, SUBSEQUENT ENCOUNTER: Primary | ICD-10-CM

## 2020-05-11 PROCEDURE — 3074F PR MOST RECENT SYSTOLIC BLOOD PRESSURE < 130 MM HG: ICD-10-PCS | Mod: S$GLB,,, | Performed by: ORTHOPAEDIC SURGERY

## 2020-05-11 PROCEDURE — 3078F DIAST BP <80 MM HG: CPT | Mod: S$GLB,,, | Performed by: ORTHOPAEDIC SURGERY

## 2020-05-11 PROCEDURE — 20610 DRAIN/INJ JOINT/BURSA W/O US: CPT | Mod: RT,S$GLB,, | Performed by: ORTHOPAEDIC SURGERY

## 2020-05-11 PROCEDURE — 3074F SYST BP LT 130 MM HG: CPT | Mod: S$GLB,,, | Performed by: ORTHOPAEDIC SURGERY

## 2020-05-11 PROCEDURE — 99213 PR OFFICE/OUTPT VISIT, EST, LEVL III, 20-29 MIN: ICD-10-PCS | Mod: 25,S$GLB,, | Performed by: ORTHOPAEDIC SURGERY

## 2020-05-11 PROCEDURE — 3078F PR MOST RECENT DIASTOLIC BLOOD PRESSURE < 80 MM HG: ICD-10-PCS | Mod: S$GLB,,, | Performed by: ORTHOPAEDIC SURGERY

## 2020-05-11 PROCEDURE — 20610 LARGE JOINT ASPIRATION/INJECTION: R SUBACROMIAL BURSA: ICD-10-PCS | Mod: RT,S$GLB,, | Performed by: ORTHOPAEDIC SURGERY

## 2020-05-11 PROCEDURE — 3008F BODY MASS INDEX DOCD: CPT | Mod: S$GLB,,, | Performed by: ORTHOPAEDIC SURGERY

## 2020-05-11 PROCEDURE — 99213 OFFICE O/P EST LOW 20 MIN: CPT | Mod: 25,S$GLB,, | Performed by: ORTHOPAEDIC SURGERY

## 2020-05-11 PROCEDURE — 3008F PR BODY MASS INDEX (BMI) DOCUMENTED: ICD-10-PCS | Mod: S$GLB,,, | Performed by: ORTHOPAEDIC SURGERY

## 2020-05-11 RX ORDER — CYCLOBENZAPRINE HCL 10 MG
TABLET ORAL
COMMUNITY
Start: 2020-05-08 | End: 2020-06-03

## 2020-05-11 RX ORDER — METHYLPREDNISOLONE ACETATE 40 MG/ML
40 INJECTION, SUSPENSION INTRA-ARTICULAR; INTRALESIONAL; INTRAMUSCULAR; SOFT TISSUE
Status: DISCONTINUED | OUTPATIENT
Start: 2020-05-11 | End: 2020-05-11 | Stop reason: HOSPADM

## 2020-05-11 RX ADMIN — METHYLPREDNISOLONE ACETATE 40 MG: 40 INJECTION, SUSPENSION INTRA-ARTICULAR; INTRALESIONAL; INTRAMUSCULAR; SOFT TISSUE at 08:05

## 2020-05-11 NOTE — PROGRESS NOTES
[facility] ORTHOPEDICS    Subjective:     Chief Complaint:   Chief Complaint   Patient presents with    Right Hip - Pain     Right hip pain/CT results. States that his hip pain is a little better, states that he is walking better some what.        Past Medical History:   Diagnosis Date    GERD (gastroesophageal reflux disease)     Hyperlipidemia     Hypertension     Kidney stone        Past Surgical History:   Procedure Laterality Date    KNEE SURGERY      OPEN REDUCTION AND INTERNAL FIXATION (ORIF) OF INJURY OF HIP Right 12/22/2019    Procedure: ORIF, HIP;  Surgeon: Todd Andrews Jr., MD;  Location: [facility];  Service: Orthopedics;  Laterality: Right;    ROTATOR CUFF REPAIR      SINUS SURGERY         Current Outpatient Medications   Medication Sig    amlodipine-atorvastatin (CADUET) 5-20 mg per tablet Take 1 tablet by mouth once daily.    apixaban (ELIQUIS) 5 mg Tab Take 1 tablet (5 mg total) by mouth 2 (two) times daily.    clobetasoL (TEMOVATE) 0.05 % cream Apply topically 2 (two) times daily.    cyclobenzaprine (FLEXERIL) 10 MG tablet     fluticasone (FLONASE) 50 mcg/actuation nasal spray 2 sprays (100 mcg total) by Each Nare route once daily.    metoprolol tartrate (LOPRESSOR) 100 MG tablet Take 1 tablet (100 mg total) by mouth 2 (two) times daily.    omeprazole (PRILOSEC OTC) 20 MG tablet Take 1 tablet by mouth once daily.    phenylephrine HCl (SINEX REGULAR NASL) by Nasal route.    potassium chloride SA (K-DUR,KLOR-CON) 10 MEQ tablet TAKE 1 TABLET(10 MEQ) BY MOUTH TWICE DAILY    triamterene-hydrochlorothiazide 37.5-25 mg (MAXZIDE-25) 37.5-25 mg per tablet TAKE 1 TABLET BY MOUTH EVERY DAY     No current facility-administered medications for this visit.        Review of patient's allergies indicates:  No Known Allergies    Family History   Problem Relation Age of Onset    Hepatitis Mother     Heart disease Father        Social History     Socioeconomic History    Marital status:       Spouse name: Not on file    Number of children: 0    Years of education: Not on file    Highest education level: Not on file   Occupational History    Occupation: [employer]   Social Needs    Financial resource strain: Not on file    Food insecurity:     Worry: Not on file     Inability: Not on file    Transportation needs:     Medical: Not on file     Non-medical: Not on file   Tobacco Use    Smoking status: Former Smoker     Packs/day: 0.50     Types: Cigarettes     Start date: 1980     Last attempt to quit: 2018     Years since quittin.7    Smokeless tobacco: Never Used    Tobacco comment: Counselled   Substance and Sexual Activity    Alcohol use: Yes     Comment: social    Drug use: No    Sexual activity: Yes     Partners: Female   Lifestyle    Physical activity:     Days per week: Not on file     Minutes per session: Not on file    Stress: Not on file   Relationships    Social connections:     Talks on phone: Not on file     Gets together: Not on file     Attends Confucianism service: Not on file     Active member of club or organization: Not on file     Attends meetings of clubs or organizations: Not on file     Relationship status: Not on file   Other Topics Concern    Not on file   Social History Narrative    Not on file       History of present illness:  Follow-up on his right hip fracture.  He is doing better all the time.  He can ride a bike does not have groin pain he use to have not taken any narcotics gets occasional back pain has a slight Trendelenburg limp now and then.  Back is slightly sore.  He does not complain of radicular pain.  Does have some right shoulder pain.  Prior to this injury he had about  open repair rotator cuff which function well initially but of the LEs couple years been having some increasing pain in the right shoulder with certain activities.  Probably aggravated by this last injury and the walker etc that was used.  He has  some pain with activities in the right shoulder.  He can push down but he cannot raise his arm up that well without pain in the right shoulder.      Review of Systems:    Constitution: Negative for chills, fever, and sweats.  Negative for unexplained weight loss.    HENT:  Negative for headaches and blurry vision.    Cardiovascular:Negative for chest pain or irregular heart beat. Negative for hypertension.    Respiratory:  Negative for cough and shortness of breath.    Gastrointestinal: Negative for abdominal pain, heartburn, melena, nausea, and vomitting.    Genitourinary:  Negative bladder incontinence and dysuria.    Musculoskeletal:  See HPI for details.     Neurological: Negative for numbness.    Psychiatric/Behavioral: Negative for depression.  The patient is not nervous/anxious.      Endocrine: Negative for polyuria    Hematologic/Lymphatic: Negative for bleeding problem.  Does not bruise/bleed easily.    Skin: Negative for poor would healing and rash    Objective:      Physical Examination:    Vital Signs:    Vitals:    05/11/20 0808   BP: 124/78   Pulse: 62       Body mass index is 32.28 kg/m².    This a well-developed, well nourished patient in no acute distress.  They are alert and oriented and cooperative to examination.        So physical exam shows right hip has good range of motion he now has good hip flexor without pain.  Has very little tenderness over the trochanter as low back minimal tenderness right and left no straight leg raise no reverse straight leg raise can touch fingers to toes.  Now the right shoulder he has a painful arc.  And he also has a tenderness at the biceps and pain manipulating biceps in the speed's test is some biceps pain right shoulder.  Passively he has good motion of the right shoulder.  We reviewed CT of his right hip done last month shows he has a DHS hip screw he had a comminuted proximal femur fracture.  The fracture line around the trochanter greater trochanter looks  normal well filled in fracture line inferior is nearly all filled in.  Hardware is not loose.  He does have a lesser troch avulsion injury which is probably not going to heal I do not see bridging callus it is displaced more than about a cm.  So he probably not going to heal that the with bone to bone.  Right shoulder shows that he has no a metallic hardware.  He has no superior migration.  No acute or chronic changes in the right shoulder.  Signature  Pertinent New Results:    XRAY Report / Interpretation:   So AP x-ray right shoulder shows no superior migration no metallic hardware.  No acute or chronic findings.  Signature    Assessment/Plan:      So my impression I think his hip is healing up reasonably well.  He is riding a bike he is much happier I think his back is from his minimal transient Trendelenburg gait which has improved a lot I do not think we are missing anything with the fracture think everything is healing in.  Probably will have a nonunion of his lesser troch but I do not think that is going to be disabling for him.  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  The right shoulder is probably a degenerative lesion could be slap could be biceps could be rotator cuff or combination.  He wants to try an injection right shoulder today which we did 1 cc Depo-Medrol 5 cc of lidocaine subdeltoid and at the biceps right shoulder.  Level by am some temporary relief.  If that continues to be an issue or if he has an acute biceps rupture with going to have him return in evaluate with MRI to see what it would take to fix his right shoulder.  Pretty sure he has something torn it will eventually get fixed in the right shoulder.  No med of narcotics needed.  Cannot do NSAID because of atrial fib and anticoagulants.  Return 3 months      This note was created using Dragon voice recognition software that occasionally misinterpreted phrases or  words.

## 2020-05-11 NOTE — PROCEDURES
Large Joint Aspiration/Injection: R subacromial bursa  Date/Time: 5/11/2020 8:00 AM  Performed by: Todd Andrews Jr., MD  Authorized by: Todd Andrews Jr., MD     Consent Done?:  Yes (Verbal)  Indications:  Pain  Site marked: the procedure site was marked    Timeout: prior to procedure the correct patient, procedure, and site was verified    Prep: patient was prepped and draped in usual sterile fashion      Local anesthesia used?: Yes    Local anesthetic:  Lidocaine 1% without epinephrine    Details:  Needle Size:  25 G  Ultrasonic Guidance for needle placement?: No    Location:  Shoulder  Site:  R subacromial bursa  Medications:  40 mg methylPREDNISolone acetate 40 mg/mL  Patient tolerance:  Patient tolerated the procedure well with no immediate complications

## 2020-05-11 NOTE — LETTER
May 11, 2020      [facility]  [address]  [place] [address]  Phone: [phone]  Fax: [phone]       Patient: Howard Segura   YOB: 1960  Date of Visit: 05/11/2020    To Whom It May Concern:    Bennett Segura  was at our office on 05/11/2020. He is released to return to work.       Sincerely,    oTdd Andrews Jr., MD

## 2020-05-15 RX ORDER — POTASSIUM CHLORIDE 750 MG/1
TABLET, EXTENDED RELEASE ORAL
Qty: 180 TABLET | Refills: 1 | Status: SHIPPED | OUTPATIENT
Start: 2020-05-15 | End: 2020-10-28 | Stop reason: SDUPTHER

## 2020-06-03 ENCOUNTER — HOSPITAL ENCOUNTER (OUTPATIENT)
Dept: PREADMISSION TESTING | Facility: HOSPITAL | Age: 60
Discharge: HOME | End: 2020-06-03
Attending: INTERNAL MEDICINE
Payer: COMMERCIAL

## 2020-06-03 DIAGNOSIS — Z01.810 PRE-PROCEDURAL CARDIOVASCULAR EXAMINATION: Primary | ICD-10-CM

## 2020-06-03 LAB
ALBUMIN SERPL BCP-MCNC: 4.8 G/DL (ref 3.5–5.2)
ALP SERPL-CCNC: 92 U/L (ref 55–135)
ALT SERPL W/O P-5'-P-CCNC: 34 U/L (ref 10–44)
ANION GAP SERPL CALC-SCNC: 12 MMOL/L (ref 8–16)
APTT PPP: 29.6 SEC (ref 23.6–33.3)
AST SERPL-CCNC: 27 U/L (ref 10–40)
BILIRUB SERPL-MCNC: 1.5 MG/DL (ref 0.1–1)
BUN SERPL-MCNC: 12 MG/DL (ref 6–20)
CALCIUM SERPL-MCNC: 9.5 MG/DL (ref 8.7–10.5)
CHLORIDE SERPL-SCNC: 100 MMOL/L (ref 95–110)
CO2 SERPL-SCNC: 26 MMOL/L (ref 23–29)
CREAT SERPL-MCNC: 0.9 MG/DL (ref 0.5–1.4)
ERYTHROCYTE [DISTWIDTH] IN BLOOD BY AUTOMATED COUNT: 14.4 % (ref 11.5–14.5)
EST. GFR  (AFRICAN AMERICAN): >60 ML/MIN/1.73 M^2
EST. GFR  (NON AFRICAN AMERICAN): >60 ML/MIN/1.73 M^2
GLUCOSE SERPL-MCNC: 118 MG/DL (ref 70–110)
HCT VFR BLD AUTO: 40.1 % (ref 40–54)
HGB BLD-MCNC: 14.4 G/DL (ref 14–18)
INR PPP: 1.3
MCH RBC QN AUTO: 29.9 PG (ref 27–31)
MCHC RBC AUTO-ENTMCNC: 35.9 G/DL (ref 32–36)
MCV RBC AUTO: 83 FL (ref 82–98)
PLATELET # BLD AUTO: 179 K/UL (ref 150–350)
PMV BLD AUTO: 9.1 FL (ref 9.2–12.9)
POTASSIUM SERPL-SCNC: 3.7 MMOL/L (ref 3.5–5.1)
PROT SERPL-MCNC: 7.6 G/DL (ref 6–8.4)
PROTHROMBIN TIME: 15.7 SEC (ref 10.6–14.8)
RBC # BLD AUTO: 4.81 M/UL (ref 4.6–6.2)
SODIUM SERPL-SCNC: 138 MMOL/L (ref 136–145)
WBC # BLD AUTO: 5.96 K/UL (ref 3.9–12.7)

## 2020-06-03 PROCEDURE — U0003 INFECTIOUS AGENT DETECTION BY NUCLEIC ACID (DNA OR RNA); SEVERE ACUTE RESPIRATORY SYNDROME CORONAVIRUS 2 (SARS-COV-2) (CORONAVIRUS DISEASE [COVID-19]), AMPLIFIED PROBE TECHNIQUE, MAKING USE OF HIGH THROUGHPUT TECHNOLOGIES AS DESCRIBED BY CMS-2020-01-R: HCPCS

## 2020-06-03 PROCEDURE — 85730 THROMBOPLASTIN TIME PARTIAL: CPT

## 2020-06-03 PROCEDURE — 80053 COMPREHEN METABOLIC PANEL: CPT

## 2020-06-03 PROCEDURE — 36415 COLL VENOUS BLD VENIPUNCTURE: CPT

## 2020-06-03 PROCEDURE — 85027 COMPLETE CBC AUTOMATED: CPT

## 2020-06-03 PROCEDURE — 85610 PROTHROMBIN TIME: CPT

## 2020-06-03 NOTE — DISCHARGE INSTRUCTIONS
 Nothing to eat or drink after midnight the night before your procedure.   Do not take any medications the morning of your procedure   Bring all your medications with you in the original pill bottles from pharmacy.   If you take blood thinners, ask your doctor if you should stop taking them.   Do not take metformin 24 hours prior to your procedure.   Do your Hibiclens wash the night before and morning of your procedure.   If you use a CPAP or BiPAP at home, please bring it with you the day of your procedure.   Make arrangements for someone you know to drive you home after your procedure. Taxi and Uber are not acceptable.     Friday 06/05/2020 06:30AM

## 2020-06-04 LAB — SARS-COV-2 RNA RESP QL NAA+PROBE: NOT DETECTED

## 2020-06-05 ENCOUNTER — ANESTHESIA EVENT (OUTPATIENT)
Dept: CARDIOLOGY | Facility: HOSPITAL | Age: 60
End: 2020-06-05
Payer: COMMERCIAL

## 2020-06-05 ENCOUNTER — CLINICAL SUPPORT (OUTPATIENT)
Dept: CARDIOLOGY | Facility: HOSPITAL | Age: 60
End: 2020-06-05
Attending: INTERNAL MEDICINE
Payer: COMMERCIAL

## 2020-06-05 ENCOUNTER — ANESTHESIA (OUTPATIENT)
Dept: CARDIOLOGY | Facility: HOSPITAL | Age: 60
End: 2020-06-05
Payer: COMMERCIAL

## 2020-06-05 ENCOUNTER — HOSPITAL ENCOUNTER (OUTPATIENT)
Facility: HOSPITAL | Age: 60
Discharge: HOME | End: 2020-06-05
Attending: INTERNAL MEDICINE | Admitting: INTERNAL MEDICINE
Payer: COMMERCIAL

## 2020-06-05 VITALS
BODY MASS INDEX: 31.57 KG/M2 | RESPIRATION RATE: 19 BRPM | DIASTOLIC BLOOD PRESSURE: 84 MMHG | OXYGEN SATURATION: 99 % | SYSTOLIC BLOOD PRESSURE: 124 MMHG | WEIGHT: 220 LBS | HEART RATE: 77 BPM | TEMPERATURE: 98 F

## 2020-06-05 DIAGNOSIS — I48.91 ATRIAL FIBRILLATION, UNSPECIFIED TYPE: ICD-10-CM

## 2020-06-05 DIAGNOSIS — I48.91 A-FIB: ICD-10-CM

## 2020-06-05 PROCEDURE — 25000003 PHARM REV CODE 250: Performed by: INTERNAL MEDICINE

## 2020-06-05 PROCEDURE — 96374 THER/PROPH/DIAG INJ IV PUSH: CPT

## 2020-06-05 PROCEDURE — 37000009 HC ANESTHESIA EA ADD 15 MINS: Performed by: INTERNAL MEDICINE

## 2020-06-05 PROCEDURE — 93005 ELECTROCARDIOGRAM TRACING: CPT | Mod: 59 | Performed by: INTERNAL MEDICINE

## 2020-06-05 PROCEDURE — 96374 THER/PROPH/DIAG INJ IV PUSH: CPT | Performed by: INTERNAL MEDICINE

## 2020-06-05 PROCEDURE — 93321 DOPPLER ECHO F-UP/LMTD STD: CPT

## 2020-06-05 PROCEDURE — 63600175 PHARM REV CODE 636 W HCPCS: Performed by: NURSE ANESTHETIST, CERTIFIED REGISTERED

## 2020-06-05 PROCEDURE — 25000003 PHARM REV CODE 250: Performed by: NURSE ANESTHETIST, CERTIFIED REGISTERED

## 2020-06-05 PROCEDURE — 37000008 HC ANESTHESIA 1ST 15 MINUTES: Performed by: INTERNAL MEDICINE

## 2020-06-05 RX ORDER — PROPOFOL 10 MG/ML
VIAL (ML) INTRAVENOUS
Status: DISCONTINUED | OUTPATIENT
Start: 2020-06-05 | End: 2020-06-05

## 2020-06-05 RX ORDER — SODIUM CHLORIDE 9 MG/ML
INJECTION, SOLUTION INTRAVENOUS CONTINUOUS PRN
Status: DISCONTINUED | OUTPATIENT
Start: 2020-06-05 | End: 2020-06-05

## 2020-06-05 RX ORDER — METOPROLOL TARTRATE 1 MG/ML
2.5 INJECTION, SOLUTION INTRAVENOUS ONCE
Status: COMPLETED | OUTPATIENT
Start: 2020-06-05 | End: 2020-06-05

## 2020-06-05 RX ADMIN — PROPOFOL 20 MG: 10 INJECTION, EMULSION INTRAVENOUS at 08:06

## 2020-06-05 RX ADMIN — PROPOFOL 50 MG: 10 INJECTION, EMULSION INTRAVENOUS at 08:06

## 2020-06-05 RX ADMIN — SODIUM CHLORIDE: 0.9 INJECTION, SOLUTION INTRAVENOUS at 08:06

## 2020-06-05 RX ADMIN — PROPOFOL 60 MG: 10 INJECTION, EMULSION INTRAVENOUS at 08:06

## 2020-06-05 RX ADMIN — PROPOFOL 40 MG: 10 INJECTION, EMULSION INTRAVENOUS at 08:06

## 2020-06-05 RX ADMIN — METOPROLOL TARTRATE 2.5 MG: 1 INJECTION, SOLUTION INTRAVENOUS at 08:06

## 2020-06-05 NOTE — ANESTHESIA POSTPROCEDURE EVALUATION
Anesthesia Post Evaluation    Patient: Howard Segura    Procedure(s) Performed: Procedure(s) (LRB):  CARDIOVERSION (N/A)  ECHOCARDIOGRAM,TRANSESOPHAGEAL (N/A)    Final Anesthesia Type: general    Patient location during evaluation: PACU  Patient participation: Yes- Able to Participate  Level of consciousness: awake and alert  Post-procedure vital signs: reviewed and stable  Pain management: adequate  Airway patency: patent  JITENDRA mitigation strategies: Multimodal analgesia, Extubation while patient is awake, Verification of full reversal of neuromuscular block and Extubation and recovery carried out in lateral, semiupright, or other nonsupine position  PONV status at discharge: No PONV  Anesthetic complications: no      Cardiovascular status: hemodynamically stable  Respiratory status: unassisted, spontaneous ventilation and room air  Hydration status: euvolemic  Follow-up not needed.          Vitals Value Taken Time   /69 6/5/2020  8:50 AM   Temp 36.6 °C (97.8 °F) 6/5/2020  8:28 AM   Pulse 90 6/5/2020  8:53 AM   Resp 24 6/5/2020  8:53 AM   SpO2 97 % 6/5/2020  8:53 AM   Vitals shown include unvalidated device data.      No case tracking events are documented in the log.      Pain/Abdifatah Score: No data recorded

## 2020-06-05 NOTE — DISCHARGE INSTRUCTIONS
GALE and Cardioversion Discharge Instructions:   Start with soft foods, once you can tolerate soft foods easily, you may begin eating solid foods.    Ask your physician when you can return to your normal activities   Do not drive for at least 24 hours    SEEK CARE IMMEDIATELY IF:   You feel your heart beating fast or fluttering   You feel weak or faint   Your leg or arm is larger than usual, painful, or warm      CALL YOUR DOCTOR IMMEDIATELY IF:   You have fever or chills   You taste blood in your mouth   You have severe sore throat   You have difficulty swallowing   Your skin is itchy, swollen, or if you have a rash    You have questions or concerns about your condition or care.

## 2020-06-05 NOTE — ANESTHESIA POSTPROCEDURE EVALUATION
Anesthesia Post Evaluation    Patient: Howard Segura    Procedure(s) Performed: Procedure(s) (LRB):  CARDIOVERSION (N/A)  ECHOCARDIOGRAM,TRANSESOPHAGEAL (N/A)    Final Anesthesia Type: MAC    Patient location during evaluation: floor  Patient participation: Yes- Able to Participate  Level of consciousness: awake and alert  Post-procedure vital signs: reviewed and stable  Pain management: adequate  Airway patency: patent    PONV status at discharge: No PONV  Anesthetic complications: no      Cardiovascular status: hemodynamically stable  Respiratory status: unassisted, spontaneous ventilation and room air  Hydration status: euvolemic  Follow-up not needed.          Vitals Value Taken Time   /84 6/5/2020  9:15 AM   Temp 36.6 °C (97.8 °F) 6/5/2020  8:28 AM   Pulse 72 6/5/2020  9:28 AM   Resp 20 6/5/2020  9:28 AM   SpO2 99 % 6/5/2020  9:28 AM   Vitals shown include unvalidated device data.      No case tracking events are documented in the log.      Pain/Abdifatah Score: Abdifatah Score: 10 (6/5/2020  8:55 AM)

## 2020-06-05 NOTE — ANESTHESIA PREPROCEDURE EVALUATION
06/05/2020  Howard Segura is a 59 y.o., male.      Patient Active Problem List   Diagnosis    Current smoker    Hay fever    Benign essential hypertension    Obesity with body mass index 30 or greater    Calculus of kidney    Familial combined hyperlipidemia    History of hypertension    Hypokalemia    Neck pain, bilateral    Erectile dysfunction    Lipoma of neck    Former smoker    Mixed dyslipidemia    Gastroesophageal reflux disease without esophagitis    Acute vasomotor rhinitis    Chronic maxillary sinusitis    Deviated nasal septum    Rhinitis medicamentosa    Closed fracture of neck of right femur    Paroxysmal atrial fibrillation    Fracture of femoral neck, right    Hypophosphatemia    Anticoagulated    Rash and nonspecific skin eruption       Past Surgical History:   Procedure Laterality Date    EXCISION OF SQUAMOUS CELL CARCINOMA      Head    KNEE SURGERY      OPEN REDUCTION AND INTERNAL FIXATION (ORIF) OF FRACTURE OF TIBIAL PLATEAU Left     OPEN REDUCTION AND INTERNAL FIXATION (ORIF) OF INJURY OF HIP Right 12/22/2019    Procedure: ORIF, HIP;  Surgeon: Todd Andrews Jr., MD;  Location: [facility];  Service: Orthopedics;  Laterality: Right;    QUADRICEPS TENDON REPAIR Left     ROTATOR CUFF REPAIR Left 2012    ROTATOR CUFF REPAIR Right 1994    SINUS SURGERY          Tobacco Use:  The patient  reports that he quit smoking about 22 months ago. His smoking use included cigarettes. He started smoking about 40 years ago. He smoked 0.50 packs per day. He has never used smokeless tobacco.     Results for orders placed or performed during the hospital encounter of 12/21/19   EKG 12-lead    Collection Time: 12/21/19 11:48 PM    Narrative    Test Reason : I49.9,    Vent. Rate : 086 BPM     Atrial Rate : 098 BPM     P-R Int : 000 ms          QRS Dur : 128 ms      QT Int : 434 ms        P-R-T Axes : 000 -19 -26 degrees     QTc Int : 519 ms    Atrial fibrillation  Right bundle branch block  Abnormal ECG  No previous ECGs available  Confirmed by Todd Cassidy MD ([id number]) on 12/23/2019 7:31:13 AM    Referred By: KATIE   SELF           Confirmed By:Todd Cassidy MD             Lab Results   Component Value Date    WBC 5.96 06/03/2020    HGB 14.4 06/03/2020    HCT 40.1 06/03/2020    MCV 83 06/03/2020     06/03/2020     BMP  Lab Results   Component Value Date     06/03/2020    K 3.7 06/03/2020     06/03/2020    CO2 26 06/03/2020    BUN 12 06/03/2020    CREATININE 0.9 06/03/2020    CALCIUM 9.5 06/03/2020    ANIONGAP 12 06/03/2020    ESTGFRAFRICA >60.0 06/03/2020    EGFRNONAA >60.0 06/03/2020             Anesthesia Evaluation    I have reviewed the Patient Summary Reports.      I have reviewed the Medications.     Review of Systems  Anesthesia Hx:  No problems with previous Anesthesia Denies Hx of Anesthetic complications  Denies Family Hx of Anesthesia complications.   Denies Personal Hx of Anesthesia complications.   Social:  Former Smoker    Hematology/Oncology:  Hematology Normal   Oncology Normal     EENT/Dental:EENT/Dental Normal   Cardiovascular:   Hypertension Dysrhythmias atrial fibrillation    Pulmonary:  Pulmonary Normal    Renal/:   renal calculi    Hepatic/GI:   PUD, GERD    Musculoskeletal:  Musculoskeletal Normal    Neurological:  Neurology Normal    Endocrine:  Endocrine Normal    Psych:  Psychiatric Normal           Physical Exam  General:  Well nourished    Airway/Jaw/Neck:  Airway Findings: Mouth Opening: Normal Tongue: Normal  General Airway Assessment: Adult  Mallampati: II  TM Distance: Normal, at least 6 cm  Jaw/Neck Findings:  Neck ROM: Normal ROM      Dental:  Dental Findings: In tact, Upper front caps   Chest/Lungs:  Chest/Lungs Findings: Clear to auscultation, Normal Respiratory Rate     Heart/Vascular:  Heart Findings: Rate: Normal  Rhythm:  Regular Rhythm  Sounds: Normal        Mental Status:  Mental Status Findings:  Alert and Oriented         Anesthesia Plan  Type of Anesthesia, risks & benefits discussed:  Anesthesia Type:  MAC  Patient's Preference:   Intra-op Monitoring Plan: standard ASA monitors  Intra-op Monitoring Plan Comments:   Post Op Pain Control Plan:   Post Op Pain Control Plan Comments:   Induction:   IV  Beta Blocker:         Informed Consent: Patient understands risks and agrees with Anesthesia plan.  Questions answered. Anesthesia consent signed with patient.  ASA Score: 3     Day of Surgery Review of History & Physical:            Ready For Surgery From Anesthesia Perspective.

## 2020-06-05 NOTE — TRANSFER OF CARE
Anesthesia Transfer of Care Note    Patient: Howard Segura    Procedure(s) Performed: Procedure(s) (LRB):  CARDIOVERSION (N/A)  ECHOCARDIOGRAM,TRANSESOPHAGEAL (N/A)    Patient location: Other: [facility] center    Anesthesia Type: MAC    Transport from OR: Transported from OR on 2-3 L/min O2 by NC with adequate spontaneous ventilation    Post pain: adequate analgesia    Post assessment: no apparent anesthetic complications    Post vital signs: stable    Level of consciousness: awake and alert    Nausea/Vomiting: no nausea/vomiting    Complications: none    Transfer of care protocol was followed      Last vitals:   Visit Vitals  BP (!) 176/100   Pulse 98   Temp 36.6 °C (97.8 °F)   Resp 20   Wt 99.8 kg (220 lb)   SpO2 100%   BMI 31.57 kg/m²

## 2020-06-30 RX ORDER — METOPROLOL TARTRATE 100 MG/1
100 TABLET ORAL 2 TIMES DAILY
Qty: 180 TABLET | Refills: 0 | Status: SHIPPED | OUTPATIENT
Start: 2020-06-30 | End: 2020-09-21 | Stop reason: SDUPTHER

## 2020-07-01 RX ORDER — METOPROLOL TARTRATE 100 MG/1
100 TABLET ORAL 2 TIMES DAILY
Qty: 180 TABLET | Refills: 0 | Status: CANCELLED | OUTPATIENT
Start: 2020-07-01

## 2020-07-16 DIAGNOSIS — I10 BENIGN ESSENTIAL HYPERTENSION: ICD-10-CM

## 2020-07-16 DIAGNOSIS — E78.49 FAMILIAL COMBINED HYPERLIPIDEMIA: ICD-10-CM

## 2020-07-16 RX ORDER — AMLODIPINE BESYLATE AND ATORVASTATIN CALCIUM 5; 20 MG/1; MG/1
1 TABLET, FILM COATED ORAL DAILY
Qty: 90 TABLET | Refills: 0
Start: 2020-07-16 | End: 2020-07-21 | Stop reason: SDUPTHER

## 2020-07-21 DIAGNOSIS — E78.49 FAMILIAL COMBINED HYPERLIPIDEMIA: ICD-10-CM

## 2020-07-21 DIAGNOSIS — I10 BENIGN ESSENTIAL HYPERTENSION: ICD-10-CM

## 2020-07-21 RX ORDER — AMLODIPINE BESYLATE AND ATORVASTATIN CALCIUM 5; 20 MG/1; MG/1
1 TABLET, FILM COATED ORAL DAILY
Qty: 90 TABLET | Refills: 0
Start: 2020-07-21 | End: 2020-08-03 | Stop reason: SDUPTHER

## 2020-08-02 DIAGNOSIS — E78.49 FAMILIAL COMBINED HYPERLIPIDEMIA: ICD-10-CM

## 2020-08-02 DIAGNOSIS — I10 BENIGN ESSENTIAL HYPERTENSION: ICD-10-CM

## 2020-08-02 DIAGNOSIS — R21 RASH: ICD-10-CM

## 2020-08-02 RX ORDER — CLOTRIMAZOLE AND BETAMETHASONE DIPROPIONATE 10; .64 MG/G; MG/G
CREAM TOPICAL 2 TIMES DAILY
Qty: 45 G | Refills: 0 | Status: SHIPPED | OUTPATIENT
Start: 2020-08-02 | End: 2021-03-25 | Stop reason: SDUPTHER

## 2020-08-03 RX ORDER — AMLODIPINE BESYLATE AND ATORVASTATIN CALCIUM 5; 20 MG/1; MG/1
1 TABLET, FILM COATED ORAL DAILY
Qty: 90 TABLET | Refills: 1
Start: 2020-08-03 | End: 2020-08-05 | Stop reason: SDUPTHER

## 2020-08-05 DIAGNOSIS — E78.49 FAMILIAL COMBINED HYPERLIPIDEMIA: ICD-10-CM

## 2020-08-05 DIAGNOSIS — I10 BENIGN ESSENTIAL HYPERTENSION: ICD-10-CM

## 2020-08-05 RX ORDER — AMLODIPINE BESYLATE AND ATORVASTATIN CALCIUM 5; 20 MG/1; MG/1
1 TABLET, FILM COATED ORAL DAILY
Qty: 90 TABLET | Refills: 1
Start: 2020-08-05 | End: 2020-08-05 | Stop reason: SDUPTHER

## 2020-08-05 RX ORDER — AMLODIPINE BESYLATE AND ATORVASTATIN CALCIUM 5; 20 MG/1; MG/1
1 TABLET, FILM COATED ORAL DAILY
Qty: 90 TABLET | Refills: 1 | Status: SHIPPED | OUTPATIENT
Start: 2020-08-05 | End: 2021-01-19 | Stop reason: SDUPTHER

## 2020-08-05 NOTE — TELEPHONE ENCOUNTER
Pt. only has 2 pills left.    Prescription sent to pharmacy.  Previously the prescription was no print.  Hence it might not have gone to the pharmacy.

## 2020-08-10 ENCOUNTER — OFFICE VISIT (OUTPATIENT)
Dept: ORTHOPEDICS | Facility: CLINIC | Age: 60
End: 2020-08-10
Payer: COMMERCIAL

## 2020-08-10 VITALS
BODY MASS INDEX: 31.5 KG/M2 | SYSTOLIC BLOOD PRESSURE: 128 MMHG | DIASTOLIC BLOOD PRESSURE: 81 MMHG | HEIGHT: 70 IN | HEART RATE: 86 BPM | WEIGHT: 220 LBS

## 2020-08-10 DIAGNOSIS — Z98.890 HISTORY OF OPEN REDUCTION AND INTERNAL FIXATION (ORIF) PROCEDURE: Primary | ICD-10-CM

## 2020-08-10 DIAGNOSIS — S72.001K CLOSED FRACTURE OF NECK OF RIGHT FEMUR WITH NONUNION, SUBSEQUENT ENCOUNTER: ICD-10-CM

## 2020-08-10 PROCEDURE — 3079F PR MOST RECENT DIASTOLIC BLOOD PRESSURE 80-89 MM HG: ICD-10-PCS | Mod: S$GLB,,, | Performed by: ORTHOPAEDIC SURGERY

## 2020-08-10 PROCEDURE — 3079F DIAST BP 80-89 MM HG: CPT | Mod: S$GLB,,, | Performed by: ORTHOPAEDIC SURGERY

## 2020-08-10 PROCEDURE — 3074F PR MOST RECENT SYSTOLIC BLOOD PRESSURE < 130 MM HG: ICD-10-PCS | Mod: S$GLB,,, | Performed by: ORTHOPAEDIC SURGERY

## 2020-08-10 PROCEDURE — 99213 PR OFFICE/OUTPT VISIT, EST, LEVL III, 20-29 MIN: ICD-10-PCS | Mod: S$GLB,,, | Performed by: ORTHOPAEDIC SURGERY

## 2020-08-10 PROCEDURE — 99213 OFFICE O/P EST LOW 20 MIN: CPT | Mod: S$GLB,,, | Performed by: ORTHOPAEDIC SURGERY

## 2020-08-10 PROCEDURE — 3074F SYST BP LT 130 MM HG: CPT | Mod: S$GLB,,, | Performed by: ORTHOPAEDIC SURGERY

## 2020-08-10 PROCEDURE — 3008F PR BODY MASS INDEX (BMI) DOCUMENTED: ICD-10-PCS | Mod: S$GLB,,, | Performed by: ORTHOPAEDIC SURGERY

## 2020-08-10 PROCEDURE — 3008F BODY MASS INDEX DOCD: CPT | Mod: S$GLB,,, | Performed by: ORTHOPAEDIC SURGERY

## 2020-08-10 NOTE — PROGRESS NOTES
[facility]    Subjective:     Chief Complaint:   Chief Complaint   Patient presents with    Right Shoulder - Pain     Right shoulder pain/injection f/u. States that the injection did help. States that his shoulder bothers him at times but not often      Right Hip - Pain     ORIF right femur 12.22.19. States that his hip is doing good. Has good days and bad days, pain is worse at night at time.        Past Medical History:   Diagnosis Date    A-fib     Duodenal ulcer     GERD (gastroesophageal reflux disease)     Hypertension     Inguinal hernia     Kidney stone     PAC (premature atrial contraction)     Skin cancer        Past Surgical History:   Procedure Laterality Date    EXCISION OF SQUAMOUS CELL CARCINOMA      Head    KNEE SURGERY      OPEN REDUCTION AND INTERNAL FIXATION (ORIF) OF FRACTURE OF TIBIAL PLATEAU Left     OPEN REDUCTION AND INTERNAL FIXATION (ORIF) OF INJURY OF HIP Right 12/22/2019    Procedure: ORIF, HIP;  Surgeon: Todd Andrews Jr., MD;  Location: [facility] OR;  Service: Orthopedics;  Laterality: Right;    QUADRICEPS TENDON REPAIR Left     ROTATOR CUFF REPAIR Left 2012    ROTATOR CUFF REPAIR Right 1994    SINUS SURGERY      TREATMENT OF CARDIAC ARRHYTHMIA N/A 6/5/2020    Procedure: CARDIOVERSION;  Surgeon: James Richter MD;  Location: [facility] CATH/EP LAB;  Service: Cardiology;  Laterality: N/A;       Current Outpatient Medications   Medication Sig    amlodipine-atorvastatin (CADUET) 5-20 mg per tablet Take 1 tablet by mouth once daily.    apixaban (ELIQUIS) 5 mg Tab Take 1 tablet (5 mg total) by mouth 2 (two) times daily.    clotrimazole-betamethasone 1-0.05% (LOTRISONE) cream Apply topically 2 (two) times daily. Apply to affected area-use sparingly.  Possible side effect of overdose of steroid which is in the cream.    metoprolol tartrate (LOPRESSOR) 100 MG tablet Take 1 tablet (100 mg total) by mouth 2 (two) times daily.    omeprazole (PRILOSEC OTC) 20 MG  tablet Take 1 tablet by mouth once daily.    phenylephrine HCl (SINEX REGULAR NASL) 1 Dose by Nasal route daily as needed.     potassium chloride SA (K-DUR,KLOR-CON) 10 MEQ tablet TAKE 1 TABLET(10 MEQ) BY MOUTH TWICE DAILY (Patient taking differently: Take 10 mEq by mouth 2 (two) times daily. )    triamterene-hydrochlorothiazide 37.5-25 mg (MAXZIDE-25) 37.5-25 mg per tablet TAKE 1 TABLET BY MOUTH EVERY DAY (Patient taking differently: Take 1 tablet by mouth once daily. )     No current facility-administered medications for this visit.        Review of patient's allergies indicates:  No Known Allergies    Family History   Problem Relation Age of Onset    Hepatitis Mother     Heart disease Father        Social History     Socioeconomic History    Marital status:      Spouse name: Not on file    Number of children: 0    Years of education: Not on file    Highest education level: Not on file   Occupational History    Occupation: [employer]   Social Needs    Financial resource strain: Not on file    Food insecurity     Worry: Not on file     Inability: Not on file    Transportation needs     Medical: Not on file     Non-medical: Not on file   Tobacco Use    Smoking status: Former Smoker     Packs/day: 0.50     Types: Cigarettes     Start date: 1980     Quit date: 2018     Years since quittin.0    Smokeless tobacco: Never Used    Tobacco comment: Counselled   Substance and Sexual Activity    Alcohol use: Not Currently    Drug use: No    Sexual activity: Not on file   Lifestyle    Physical activity     Days per week: Not on file     Minutes per session: Not on file    Stress: Not on file   Relationships    Social connections     Talks on phone: Not on file     Gets together: Not on file     Attends Pentecostalism service: Not on file     Active member of club or organization: Not on file     Attends meetings of clubs or organizations: Not on file     Relationship  status: Not on file   Other Topics Concern    Not on file   Social History Narrative    Not on file       History of present illness:  Follow-up of hip fracture right he doing very well at this point he can ride a bike 20 miles he can walk all day gets a little tired right hip at times but that is it back to work feeling good taking no meds chest is clear no issues      Review of Systems:    Constitution: Negative for chills, fever, and sweats.  Negative for unexplained weight loss.    HENT:  Negative for headaches and blurry vision.    Cardiovascular:Negative for chest pain or irregular heart beat. Negative for hypertension.    Respiratory:  Negative for cough and shortness of breath.    Gastrointestinal: Negative for abdominal pain, heartburn, melena, nausea, and vomitting.    Genitourinary:  Negative bladder incontinence and dysuria.    Musculoskeletal:  See HPI for details.     Neurological: Negative for numbness.    Psychiatric/Behavioral: Negative for depression.  The patient is not nervous/anxious.      Endocrine: Negative for polyuria    Hematologic/Lymphatic: Negative for bleeding problem.  Does not bruise/bleed easily.    Skin: Negative for poor would healing and rash    Objective:      Physical Examination:    Vital Signs:    Vitals:    08/10/20 0806   BP: 128/81   Pulse: 86       Body mass index is 31.57 kg/m².    This a well-developed, well nourished patient in no acute distress.  They are alert and oriented and cooperative to examination.        Physical exam shows he is walking normally today is no tenderness around the right hip.  Good range of motion right hip  Pertinent New Results:    XRAY Report / Interpretation:   AP x-ray right hip shows a intertrochanteric fracture that is now well healed mature callus everywhere including the lesser t trochanter ity appears to have callus which has progressed since prior x-rays.  There is a DHS hip screw in place.  There has been telescoping of the sliding  hip screw which is stable for many months now.  So he does have mild shortening of the femoral head neck compared to normal.  But looks excellent well-healed overall.  Electronically Signed By Todd Andrews JR, MD    Assessment/Plan:      So impression of think he has well healed right hip fracture was comminuted there was mild shortening.  But he is now well healed in good condition and he is doing his normal job he is discharged see him back p.r.n..  Fit for duty      This note was created using Dragon voice recognition software that occasionally misinterpreted phrases or words.

## 2020-08-25 ENCOUNTER — PATIENT MESSAGE (OUTPATIENT)
Dept: FAMILY MEDICINE | Facility: CLINIC | Age: 60
End: 2020-08-25

## 2020-08-25 DIAGNOSIS — J06.9 UPPER RESPIRATORY TRACT INFECTION, UNSPECIFIED TYPE: Primary | ICD-10-CM

## 2020-08-25 RX ORDER — AMOXICILLIN 500 MG/1
500 TABLET, FILM COATED ORAL 3 TIMES DAILY
Qty: 21 TABLET | Refills: 0 | Status: SHIPPED | OUTPATIENT
Start: 2020-08-25 | End: 2020-09-01

## 2020-08-25 RX ORDER — METHYLPREDNISOLONE 4 MG/1
TABLET ORAL
Qty: 1 PACKAGE | Refills: 0 | Status: SHIPPED | OUTPATIENT
Start: 2020-08-25 | End: 2020-09-11

## 2020-08-25 NOTE — TELEPHONE ENCOUNTER
Prescription for amoxicillin and Medrol Dosepak to pharmacy at patient request.  Cannot make office visit due to COVID-19.  Cannot do virtual appointment either.

## 2020-09-11 ENCOUNTER — OFFICE VISIT (OUTPATIENT)
Dept: FAMILY MEDICINE | Facility: CLINIC | Age: 60
End: 2020-09-11
Payer: COMMERCIAL

## 2020-09-11 VITALS
DIASTOLIC BLOOD PRESSURE: 86 MMHG | SYSTOLIC BLOOD PRESSURE: 120 MMHG | HEART RATE: 86 BPM | WEIGHT: 222 LBS | BODY MASS INDEX: 31.78 KG/M2 | TEMPERATURE: 98 F | HEIGHT: 70 IN

## 2020-09-11 DIAGNOSIS — Z12.5 SCREENING FOR PROSTATE CANCER: ICD-10-CM

## 2020-09-11 DIAGNOSIS — I10 BENIGN ESSENTIAL HYPERTENSION: Primary | ICD-10-CM

## 2020-09-11 DIAGNOSIS — I48.0 PAROXYSMAL ATRIAL FIBRILLATION: ICD-10-CM

## 2020-09-11 DIAGNOSIS — E78.49 FAMILIAL COMBINED HYPERLIPIDEMIA: ICD-10-CM

## 2020-09-11 DIAGNOSIS — H61.23 BILATERAL IMPACTED CERUMEN: ICD-10-CM

## 2020-09-11 DIAGNOSIS — Z79.01 ANTICOAGULATED: ICD-10-CM

## 2020-09-11 PROBLEM — J30.1 HAY FEVER: Status: RESOLVED | Noted: 2017-11-08 | Resolved: 2020-09-11

## 2020-09-11 PROBLEM — E87.6 HYPOKALEMIA: Status: RESOLVED | Noted: 2017-11-08 | Resolved: 2020-09-11

## 2020-09-11 PROBLEM — J30.0 ACUTE VASOMOTOR RHINITIS: Status: RESOLVED | Noted: 2019-03-20 | Resolved: 2020-09-11

## 2020-09-11 PROBLEM — J32.0 CHRONIC MAXILLARY SINUSITIS: Status: RESOLVED | Noted: 2019-03-20 | Resolved: 2020-09-11

## 2020-09-11 PROBLEM — E83.39 HYPOPHOSPHATEMIA: Status: RESOLVED | Noted: 2019-12-23 | Resolved: 2020-09-11

## 2020-09-11 PROBLEM — J31.0 RHINITIS MEDICAMENTOSA: Status: RESOLVED | Noted: 2019-03-20 | Resolved: 2020-09-11

## 2020-09-11 PROBLEM — M54.2 NECK PAIN, BILATERAL: Status: RESOLVED | Noted: 2018-05-08 | Resolved: 2020-09-11

## 2020-09-11 PROBLEM — F17.200 CURRENT SMOKER: Status: RESOLVED | Noted: 2017-11-08 | Resolved: 2020-09-11

## 2020-09-11 PROBLEM — R21 RASH AND NONSPECIFIC SKIN ERUPTION: Status: RESOLVED | Noted: 2020-05-04 | Resolved: 2020-09-11

## 2020-09-11 PROBLEM — S72.001A FRACTURE OF FEMORAL NECK, RIGHT: Status: RESOLVED | Noted: 2019-12-22 | Resolved: 2020-09-11

## 2020-09-11 PROBLEM — T48.5X5A RHINITIS MEDICAMENTOSA: Status: RESOLVED | Noted: 2019-03-20 | Resolved: 2020-09-11

## 2020-09-11 PROBLEM — S72.001A CLOSED FRACTURE OF NECK OF RIGHT FEMUR: Status: RESOLVED | Noted: 2019-12-22 | Resolved: 2020-09-11

## 2020-09-11 PROCEDURE — 3008F BODY MASS INDEX DOCD: CPT | Mod: S$GLB,,, | Performed by: INTERNAL MEDICINE

## 2020-09-11 PROCEDURE — 99214 OFFICE O/P EST MOD 30 MIN: CPT | Mod: S$GLB,,, | Performed by: INTERNAL MEDICINE

## 2020-09-11 PROCEDURE — 3074F SYST BP LT 130 MM HG: CPT | Mod: S$GLB,,, | Performed by: INTERNAL MEDICINE

## 2020-09-11 PROCEDURE — 3074F PR MOST RECENT SYSTOLIC BLOOD PRESSURE < 130 MM HG: ICD-10-PCS | Mod: S$GLB,,, | Performed by: INTERNAL MEDICINE

## 2020-09-11 PROCEDURE — 99214 PR OFFICE/OUTPT VISIT, EST, LEVL IV, 30-39 MIN: ICD-10-PCS | Mod: S$GLB,,, | Performed by: INTERNAL MEDICINE

## 2020-09-11 PROCEDURE — 3008F PR BODY MASS INDEX (BMI) DOCUMENTED: ICD-10-PCS | Mod: S$GLB,,, | Performed by: INTERNAL MEDICINE

## 2020-09-11 PROCEDURE — 3079F DIAST BP 80-89 MM HG: CPT | Mod: S$GLB,,, | Performed by: INTERNAL MEDICINE

## 2020-09-11 PROCEDURE — 3079F PR MOST RECENT DIASTOLIC BLOOD PRESSURE 80-89 MM HG: ICD-10-PCS | Mod: S$GLB,,, | Performed by: INTERNAL MEDICINE

## 2020-09-11 NOTE — PROGRESS NOTES
Subjective:       Patient ID: Howard Segura is a 59 y.o. male.    Chief Complaint: Hypertension, Hyperlipidemia, Atrial Fibrillation, and Ear Fullness    Mr. Howard Morrison is a pleasant 59-year-old  gentleman who comes for follow-up. He has underlying hypertension, dyslipidemia and gastroesophageal reflux.  For 2 years he has finally successfully quit smoking.     Thus far he has been successful with no relapse.     On December 20, 2019 he was climbing down his stilted house when he slipped his leg and hydroplaned.  He injured his right-sided ribs and had right hip fracture also.  After that he had a hip surgery.  He is recovering gradually from that with a combination of exercise and strength.  The right hip surgery had gone well.  He is doing at least 60 miles of bicycling each week to strength in his legs, knees and hip also.    Few months ago he had started enjoying his bourbon with cream and his triglycerides went above 600.  He has stop doing that.    On the positive side, his reflux symptoms have dissipated completely. He does have some sinus issues.    He continues to work for the [employer] and [employer].  Perhaps in a few years he might consider [employer].  But at this point he is full with his work.    Patient still has stable atrial fibrillation.  He did have a cardioversion, but it lasted no longer than 10 minutes.  He currently is taking Eliquis for anticoagulation.  He is also on metoprolol 100 mg per day.  For blood pressure and cholesterol combination he takes amlodipine and atorvastatin.  He also takes Dyazide as a diuretic.            Hypertension  This is a chronic problem. The current episode started more than 1 year ago. Pertinent negatives include no chest pain, headaches, neck pain, palpitations or shortness of breath. Risk factors for coronary artery disease include male gender, obesity and dyslipidemia. Past treatments include beta blockers, diuretics and calcium channel  blockers. The current treatment provides moderate improvement.   Hyperlipidemia  This is a recurrent problem. The current episode started more than 1 year ago. The problem is uncontrolled. Recent lipid tests were reviewed and are high. Exacerbating diseases include obesity. Pertinent negatives include no chest pain or shortness of breath. Current antihyperlipidemic treatment includes fibric acid derivatives. Risk factors for coronary artery disease include a sedentary lifestyle, hypertension, male sex and dyslipidemia.   Atrial Fibrillation  Presents for follow-up visit. Symptoms are negative for chest pain, dizziness, hypertension, hypotension, palpitations, shortness of breath and syncope. The symptoms have been stable. Past medical history includes atrial fibrillation and hyperlipidemia.   Ear Fullness   There is pain in both ears. This is a chronic problem. The current episode started more than 1 month ago. The problem occurs constantly. There has been no fever. Pertinent negatives include no abdominal pain, coughing, diarrhea, headaches, hearing loss, neck pain, rash, rhinorrhea or vomiting.   Gastroesophageal Reflux  He complains of heartburn. He reports no abdominal pain, no chest pain, no choking, no coughing or no wheezing. This is a recurrent problem. The problem has been rapidly improving (after qutting smoking). Pertinent negatives include no fatigue or melena. Risk factors include obesity and caffeine use (Cut down on coffee and quit smoking). He has tried a PPI for the symptoms. The treatment provided significant (after quitting smoking) relief.       Past Medical History:   Diagnosis Date    A-fib     Duodenal ulcer     GERD (gastroesophageal reflux disease)     Hypertension     Inguinal hernia     Kidney stone     PAC (premature atrial contraction)     Skin cancer      Social History     Socioeconomic History    Marital status:      Spouse name: Not on file    Number of children: 0     Years of education: Not on file    Highest education level: Not on file   Occupational History    Occupation: [employer]   Social Needs    Financial resource strain: Not on file    Food insecurity     Worry: Not on file     Inability: Not on file    Transportation needs     Medical: Not on file     Non-medical: Not on file   Tobacco Use    Smoking status: Former Smoker     Packs/day: 0.50     Types: Cigarettes     Start date: 1980     Quit date: 2018     Years since quittin.1    Smokeless tobacco: Never Used    Tobacco comment: Counselled   Substance and Sexual Activity    Alcohol use: Not Currently    Drug use: No    Sexual activity: Not on file   Lifestyle    Physical activity     Days per week: Not on file     Minutes per session: Not on file    Stress: Not at all   Relationships    Social connections     Talks on phone: Not on file     Gets together: Not on file     Attends Pentecostal service: Not on file     Active member of club or organization: Not on file     Attends meetings of clubs or organizations: Not on file     Relationship status: Not on file   Other Topics Concern    Not on file   Social History Narrative    Not on file     Past Surgical History:   Procedure Laterality Date    EXCISION OF SQUAMOUS CELL CARCINOMA      Head    KNEE SURGERY      OPEN REDUCTION AND INTERNAL FIXATION (ORIF) OF FRACTURE OF TIBIAL PLATEAU Left     OPEN REDUCTION AND INTERNAL FIXATION (ORIF) OF INJURY OF HIP Right 2019    Procedure: ORIF, HIP;  Surgeon: Todd Andrews Jr., MD;  Location: [facility];  Service: Orthopedics;  Laterality: Right;    QUADRICEPS TENDON REPAIR Left     ROTATOR CUFF REPAIR Left     ROTATOR CUFF REPAIR Right     SINUS SURGERY      TREATMENT OF CARDIAC ARRHYTHMIA N/A 2020    Procedure: CARDIOVERSION;  Surgeon: James Richter MD;  Location: [facility] CATH/EP LAB;  Service: Cardiology;  Laterality: N/A;     Family History  "  Problem Relation Age of Onset    Hepatitis Mother     Heart disease Father        Review of Systems   Constitutional: Negative for activity change, appetite change, chills, diaphoresis, fatigue, fever and unexpected weight change (wt gain after quitting smoking).   HENT: Negative for hearing loss, rhinorrhea and trouble swallowing.    Eyes: Negative for discharge, redness and visual disturbance.   Respiratory: Negative for cough, choking, chest tightness, shortness of breath and wheezing.    Cardiovascular: Negative for chest pain, palpitations, leg swelling and syncope.        HTN/dyslipidemia-atrial fibrillation on metoprolol and Eliquis as anticoagulation.  This is stable.  Cardioversion was attempted in past without relief.  Not sure if he may be a candidate for Watchman procedure.   Gastrointestinal: Positive for heartburn. Negative for abdominal distention, abdominal pain, blood in stool, constipation, diarrhea, melena and vomiting.        GERD better   Endocrine: Negative for cold intolerance, heat intolerance, polydipsia, polyphagia and polyuria.   Genitourinary: Negative for difficulty urinating, hematuria and urgency.   Musculoskeletal: Negative for arthralgias, joint swelling and neck pain.   Skin: Negative for color change, pallor, rash and wound.   Neurological: Negative for dizziness and headaches.   Psychiatric/Behavioral: Negative for agitation, confusion and dysphoric mood.         Objective:      Blood pressure 120/86, pulse 86, temperature 97.9 °F (36.6 °C), height 5' 10" (1.778 m), weight 100.7 kg (222 lb). Body mass index is 31.85 kg/m².  Physical Exam  Vitals signs and nursing note reviewed.   Constitutional:       Appearance: He is well-developed.   HENT:      Head: Normocephalic and atraumatic.      Right Ear: There is impacted cerumen.      Left Ear: There is impacted cerumen.      Ears:      Comments: Bilateral wax impaction noted in the ears.  This may be causing him sense of " fullness.     Nose: Nose normal.      Mouth/Throat:      Pharynx: No oropharyngeal exudate.   Eyes:      Conjunctiva/sclera: Conjunctivae normal.   Neck:      Musculoskeletal: Normal range of motion and neck supple.      Thyroid: No thyromegaly.      Vascular: No JVD.      Trachea: No tracheal deviation.   Cardiovascular:      Rate and Rhythm: Normal rate. Rhythm irregular.      Heart sounds: Heart sounds are distant. No murmur. No friction rub. No gallop.       Comments: Patient's heart sounds are somewhat distant but also are irregular.  Pulmonary:      Effort: Pulmonary effort is normal. No respiratory distress.      Breath sounds: Normal breath sounds. No wheezing or rales.   Abdominal:      General: Bowel sounds are normal. There is no distension.      Palpations: Abdomen is soft.      Tenderness: There is no abdominal tenderness.   Musculoskeletal: Normal range of motion.        Legs:    Skin:     General: Skin is warm and dry.   Neurological:      Mental Status: He is alert and oriented to person, place, and time.           Assessment:       1. Benign essential hypertension    2. Familial combined hyperlipidemia    3. Paroxysmal atrial fibrillation    4. Anticoagulated    5. Bilateral impacted cerumen    6. Screening for prostate cancer           No visits with results within 3 Month(s) from this visit.   Latest known visit with results is:   Hospital Outpatient Visit on 06/03/2020   Component Date Value Ref Range Status    SARS-CoV2 (COVID-19) Qualitative P* 06/03/2020 Not Detected  Not Detected Final    Sodium 06/03/2020 138  136 - 145 mmol/L Final    Potassium 06/03/2020 3.7  3.5 - 5.1 mmol/L Final    Chloride 06/03/2020 100  95 - 110 mmol/L Final    CO2 06/03/2020 26  23 - 29 mmol/L Final    Glucose 06/03/2020 118* 70 - 110 mg/dL Final    BUN, Bld 06/03/2020 12  6 - 20 mg/dL Final    Creatinine 06/03/2020 0.9  0.5 - 1.4 mg/dL Final    Calcium 06/03/2020 9.5  8.7 - 10.5 mg/dL Final    Total  Protein 06/03/2020 7.6  6.0 - 8.4 g/dL Final    Albumin 06/03/2020 4.8  3.5 - 5.2 g/dL Final    Total Bilirubin 06/03/2020 1.5* 0.1 - 1.0 mg/dL Final    Alkaline Phosphatase 06/03/2020 92  55 - 135 U/L Final    AST 06/03/2020 27  10 - 40 U/L Final    ALT 06/03/2020 34  10 - 44 U/L Final    Anion Gap 06/03/2020 12  8 - 16 mmol/L Final    eGFR if African American 06/03/2020 >60.0  >60 mL/min/1.73 m^2 Final    eGFR if non African American 06/03/2020 >60.0  >60 mL/min/1.73 m^2 Final    WBC 06/03/2020 5.96  3.90 - 12.70 K/uL Final    RBC 06/03/2020 4.81  4.60 - 6.20 M/uL Final    Hemoglobin 06/03/2020 14.4  14.0 - 18.0 g/dL Final    Hematocrit 06/03/2020 40.1  40.0 - 54.0 % Final    Mean Corpuscular Volume 06/03/2020 83  82 - 98 fL Final    Mean Corpuscular Hemoglobin 06/03/2020 29.9  27.0 - 31.0 pg Final    Mean Corpuscular Hemoglobin Conc 06/03/2020 35.9  32.0 - 36.0 g/dL Final    RDW 06/03/2020 14.4  11.5 - 14.5 % Final    Platelets 06/03/2020 179  150 - 350 K/uL Final    MPV 06/03/2020 9.1* 9.2 - 12.9 fL Final    PT 06/03/2020 15.7* 10.6 - 14.8 sec Final    INR 06/03/2020 1.3   Final    aPTT 06/03/2020 29.6  23.6 - 33.3 sec Final         Plan:           Benign essential hypertension  -     Comprehensive metabolic panel; Future; Expected date: 09/11/2020    Familial combined hyperlipidemia  -     Lipid Panel; Future; Expected date: 09/11/2020    Paroxysmal atrial fibrillation    Anticoagulated    Bilateral impacted cerumen    Screening for prostate cancer  -     PSA, Screening; Future; Expected date: 09/11/2020    Patient has been advised to watch diet and exercise. Avoid fried and fatty food. Compliance to medications and follow up urged.    Advised Mr. Segura for Anti reflux measures like small feequent meals, avoid spicy and greasy food. Head end up at night.    He has been complemented on quitting smoking. I've encouraged him continued abstinence.    Patient seems to be in stable atrial  fibrillation with a controlled rate.  His exercise tolerance is pretty good as he bicycles approximately 60 miles a week.  His gaining his strength in the legs.    Wax has been noted in both the ears and I have recommended him to use Debrox drops 5-6 drops each ear twice a day for 3 days.  Use a soft Q-tips and gentle flushing with warm water.  If still feeling full, then he can come 1 of these days for a ear irrigation.    I have ordered him labs for lipid panel, chemistry and also PSA.  Dr. Richter will also have access to these labs through the [employer] system.    Patient's reflux symptoms have overall improved after quitting smoking. His breathing also has improved significantly. His triglycerides are elevated and at this point I will recommend him to lose weight by 15-20 pounds over the next 4-6 months. This would definitely bring down his triglycerides naturally.    Given his exercise and stopping bourbon with cream, I expect a better lipid panel at this point.    Otherwise Mr. Segura seems to be happy and content gentleman.  He seems to enjoy his work and keeps his family safe.  He [place] also safe.    I am sure he will continue to maintain good health over the next several years or so.  I would like to see him back in 6 months time for follow-up.    He will get the flu shot from his work and I have asked him to keep a copy of that shot as a documentable proof.    At this point he is taking some vitamin-C and zinc.  He can add some over-the-counter vitamin-D also.  These supplements have been anecdotally  thought to have increased immunity.     follow-up in 6 months    Spent bertin 25 minutes with patient which involved review of pts medical conditions, labs, medications and with 50% of time face-to-face discussion about medical problems, management and any applicable changes.        Current Outpatient Medications:     amlodipine-atorvastatin (CADUET) 5-20 mg per tablet, Take 1 tablet by mouth  once daily., Disp: 90 tablet, Rfl: 1    apixaban (ELIQUIS) 5 mg Tab, Take 1 tablet (5 mg total) by mouth 2 (two) times daily., Disp: 60 tablet, Rfl: 0    clotrimazole-betamethasone 1-0.05% (LOTRISONE) cream, Apply topically 2 (two) times daily. Apply to affected area-use sparingly.  Possible side effect of overdose of steroid which is in the cream., Disp: 45 g, Rfl: 0    metoprolol tartrate (LOPRESSOR) 100 MG tablet, Take 1 tablet (100 mg total) by mouth 2 (two) times daily., Disp: 180 tablet, Rfl: 0    omeprazole (PRILOSEC OTC) 20 MG tablet, Take 1 tablet by mouth once daily., Disp: , Rfl:     potassium chloride SA (K-DUR,KLOR-CON) 10 MEQ tablet, TAKE 1 TABLET(10 MEQ) BY MOUTH TWICE DAILY (Patient taking differently: Take 10 mEq by mouth 2 (two) times daily. ), Disp: 180 tablet, Rfl: 1    triamterene-hydrochlorothiazide 37.5-25 mg (MAXZIDE-25) 37.5-25 mg per tablet, TAKE 1 TABLET BY MOUTH EVERY DAY (Patient taking differently: Take 1 tablet by mouth once daily. ), Disp: 90 tablet, Rfl: 3

## 2020-09-11 NOTE — PATIENT INSTRUCTIONS
Earwax Removal    The ear canal makes earwax from the canals lining. The ears make wax to lubricate and protect the ear canal. The ear canal is the tube that connects the middle ear to the outside of the ear. The wax protects the ear from bacteria, infection, and damage from water or trauma.  The wax that forms in the canal naturally moves toward the outside of the ear and falls out. In some cases, the ear may make too much wax. If the wax causes problems or keeps the healthcare provider from seeing into the ear, the extra wax may be removed.  Too much wax can affect your hearing. It can cause itching. In rare cases, it can be painful. Earwax should not be removed unless it is causing a problem. You should not stick objects into your ear to remove wax unless told to do so by your healthcare provider.  Healthcare providers can remove earwax safely. It is important to stay still during the procedure to avoid damage to the ear canal. But removing earwax generally doesnt hurt. You will not usually need anesthesia or pain medicine when the provider removes the earwax.  A number of conditions lead to earwax buildup. These include some skin problems, a narrow ear canal, or ears that make too much earwax. Using cotton swabs in the canal pushes earwax deeper into the ear and contributes to the buildup of earwax.  Home care  · The healthcare provider may recommend mineral oil or an over-the-counter eardrop to use at home to soften the earwax. Use these products only if the provider recommends them. Use these products only if the provider recommends them. Carefully follow the instructions given.  · Dont use mineral oil or OTC eardrops if you might have an ear infection or a ruptured eardrum. Tell your healthcare provider right away if you have diabetes or an immune disorder.  · Dont use cotton swabs in your ears. Cotton swabs may push wax deeper into the ear canal or damage the eardrum. Use cotton gauze or a wet  washcloth  to gently remove wax on the outside of the ear and around the opening to the ear canal.  · Don't use any probing device or object such as cotton-tipped swabs or anika pins to clean the inside of your ears.  · Dont use ear candles to clean your ears. Candling can be dangerous. It can burn the ear canal. It can also make the condition worse instead of better.  · Dont use cold water to rinse the ear. This will make you dizzy. If your provider tells you to rinse your ear, use only warm water or follow his or her instructions.  · Check the ear for signs of infection or irritation listed below under When to seek medical advice.  Steps for using eardrops  1. Warm the medicine bottle by rubbing it between your hands for a few minutes.  2. Lie down on your side, with the affected ear up.  3. Place the recommended number of drops in the ear. Wet a cotton ball with the medicine. Gently put the cotton ball into the ear opening.  Follow-up care  Follow up with your healthcare provider, or as directed.  When to seek medical advice  Call the provider right away if you have:  · Ear pain that gets worse  · Fever of 100.4F°F (38°C) or higher, or as directed by your healthcare provider  · Worsening wax buildup  · Severe pain, dizziness, or nausea  · Bleeding from the ear  · Hearing problems  · Signs of irritation from the eardrops, such as burning, stinging, or swelling and tenderness  · Foul-smelling fluid draining from the ear  · Swelling, redness, or tenderness of the outer ear  · Headache, neck pain, or stiff neck  Date Last Reviewed: 3/22/2015  © 9362-2328 [employer]. [address] [place] [address]. All rights reserved. This information is not intended as a substitute for professional medical care. Always follow your healthcare professional's instructions.        Atrial Fibrillation    Atrial fibrillation is a condition in which the heart beats in an irregular pattern. It is caused by a  problem in the heart's electrical pathways. It can be a sign of heart disease or other health problems that affect the heart.  Heart palpitations are the most common symptom of atrial fibrillation. This is the feeling that your heart is fluttering, beating fast, hard, or irregular. When the heart beats too fast, it doesn't pump blood very well. This can cause other symptoms like anxiety, fatigue, shortness of breath, chest pain, dizziness, or fainting. Atrial fibrillation may come and go. It can last from a few hours to a couple of days. Or, it may become chronic, lasting for months at a time or even become permanent.  Atrial fibrillation may be caused by heart disease or other conditions in the body that affect the heart:  · Coronary artery disease (atherosclerosis)  · High blood pressure  · Disease of the heart valves  · Enlarged heart  · Heart failure  Atrial fibrillation can also occur without heart disease because of:  · Overactive thyroid (hyperthyroid)  · Chronic lung disease (COPD, emphysema, bronchitis)  · Heavy alcohol use  · Cardiac stimulants like cocaine, amphetamines, diet pills, certain decongestant cold medicines, caffeine, or nicotine  · Infection  · Blood clot in the lung (pulmonary embolus)  · Diabetes  · Chronic kidney disease  · Obesity  · Extreme athletic conditioning  Treating or removing these causes will help your treatment for atrial fibrillation. It will also make it less likely for the atrial fibrillation to come back.  Atrial fibrillation can alternate back and forth with another abnormal rhythm called atrial flutter. Atrial flutter is a more regular heart rhythm and is also associated with an increased stroke risk. Proper treatment can lower your risk for stroke.  Home care  Follow these guidelines when caring for yourself at home:  · Go back to your usual activities as soon as you are feeling back to normal.  · If you smoke, stop smoking. Contact your healthcare provider or a local  stop-smoking program for help.  · Don't use stimulants like alcohol, cocaine, amphetamines, diet pills, certain decongestant cold medicines, caffeine, or nicotine.  · If your provider prescribed medicine to stop atrial fibrillation from coming back, take it exactly as directed. Some medicines must be taken every day, not just when you have symptoms. This will help them work as they should.  · If you were prescribed warfarin to lower your risk for stroke, have your blood tested on a regular basis as advised by your provider. This will make sure you are getting the dose that is right for you. It also lower your risk for side effects.  Follow-up care  Follow up with your healthcare provider, or as advised.  When to seek medical advice  Call your healthcare provider right away if any of these following occur:  · Shortness of breath or swelling in the legs gets worse  · Unexpected weight gain  · Chest pain or the sense that your heart is fluttering or beating fast or hard (palpitations)  · Any sign of bleeding if you are on a blood thinner   · Pain, redness, or swelling in one leg  Also call your provider right away if you have these signs of stroke:  · Weakness of an arm or leg or one side of the face  · Difficulty with speech or vision  · Extreme drowsiness, confusion, dizziness, or fainting  Date Last Reviewed: 5/1/2016  © 1062-6597 [employer]. [address] [place] [address], [place] [address]. All rights reserved. This information is not intended as a substitute for professional medical care. Always follow your healthcare professional's instructions.

## 2020-09-21 ENCOUNTER — PATIENT MESSAGE (OUTPATIENT)
Dept: FAMILY MEDICINE | Facility: CLINIC | Age: 60
End: 2020-09-21

## 2020-09-21 RX ORDER — METOPROLOL TARTRATE 100 MG/1
100 TABLET ORAL 2 TIMES DAILY
Qty: 180 TABLET | Refills: 2 | Status: SHIPPED | OUTPATIENT
Start: 2020-09-21 | End: 2021-03-25 | Stop reason: SDUPTHER

## 2020-09-22 NOTE — TELEPHONE ENCOUNTER
Called pt. He said his wife wanted to him to check his Vit. D. I told him that unless he had a good reason, like Vit. D deficiency, then the ins. wouldn't pay for it. Pt. understood.

## 2020-09-25 ENCOUNTER — LAB VISIT (OUTPATIENT)
Dept: LAB | Facility: HOSPITAL | Age: 60
End: 2020-09-25
Attending: INTERNAL MEDICINE
Payer: COMMERCIAL

## 2020-09-25 DIAGNOSIS — E78.49 FAMILIAL COMBINED HYPERLIPIDEMIA: ICD-10-CM

## 2020-09-25 DIAGNOSIS — I10 BENIGN ESSENTIAL HYPERTENSION: ICD-10-CM

## 2020-09-25 DIAGNOSIS — Z12.5 SCREENING FOR PROSTATE CANCER: ICD-10-CM

## 2020-09-25 LAB
ALBUMIN SERPL BCP-MCNC: 4.4 G/DL (ref 3.5–5.2)
ALP SERPL-CCNC: 86 U/L (ref 55–135)
ALT SERPL W/O P-5'-P-CCNC: 30 U/L (ref 10–44)
ANION GAP SERPL CALC-SCNC: 13 MMOL/L (ref 8–16)
AST SERPL-CCNC: 26 U/L (ref 10–40)
BILIRUB SERPL-MCNC: 1.1 MG/DL (ref 0.1–1)
BUN SERPL-MCNC: 14 MG/DL (ref 6–20)
CALCIUM SERPL-MCNC: 9.2 MG/DL (ref 8.7–10.5)
CHLORIDE SERPL-SCNC: 99 MMOL/L (ref 95–110)
CHOLEST SERPL-MCNC: 135 MG/DL (ref 120–199)
CHOLEST/HDLC SERPL: 4.7 {RATIO} (ref 2–5)
CO2 SERPL-SCNC: 28 MMOL/L (ref 23–29)
COMPLEXED PSA SERPL-MCNC: 1.6 NG/ML (ref 0–4)
CREAT SERPL-MCNC: 1 MG/DL (ref 0.5–1.4)
EST. GFR  (AFRICAN AMERICAN): >60 ML/MIN/1.73 M^2
EST. GFR  (NON AFRICAN AMERICAN): >60 ML/MIN/1.73 M^2
GLUCOSE SERPL-MCNC: 120 MG/DL (ref 70–110)
HDLC SERPL-MCNC: 29 MG/DL (ref 40–75)
HDLC SERPL: 21.5 % (ref 20–50)
LDLC SERPL CALC-MCNC: 57.8 MG/DL (ref 63–159)
NONHDLC SERPL-MCNC: 106 MG/DL
POTASSIUM SERPL-SCNC: 3.8 MMOL/L (ref 3.5–5.1)
PROT SERPL-MCNC: 6.9 G/DL (ref 6–8.4)
SODIUM SERPL-SCNC: 140 MMOL/L (ref 136–145)
TRIGL SERPL-MCNC: 241 MG/DL (ref 30–150)

## 2020-09-25 PROCEDURE — 80053 COMPREHEN METABOLIC PANEL: CPT

## 2020-09-25 PROCEDURE — 80061 LIPID PANEL: CPT

## 2020-09-25 PROCEDURE — 36415 COLL VENOUS BLD VENIPUNCTURE: CPT

## 2020-09-25 PROCEDURE — 84153 ASSAY OF PSA TOTAL: CPT

## 2020-10-15 ENCOUNTER — PATIENT MESSAGE (OUTPATIENT)
Dept: FAMILY MEDICINE | Facility: CLINIC | Age: 60
End: 2020-10-15

## 2020-10-19 ENCOUNTER — CLINICAL SUPPORT (OUTPATIENT)
Dept: FAMILY MEDICINE | Facility: CLINIC | Age: 60
End: 2020-10-19
Payer: COMMERCIAL

## 2020-10-19 VITALS — TEMPERATURE: 97 F

## 2020-10-19 DIAGNOSIS — Z23 NEED FOR INFLUENZA VACCINATION: Primary | ICD-10-CM

## 2020-10-19 PROCEDURE — 90471 IMMUNIZATION ADMIN: CPT | Mod: S$GLB,,, | Performed by: INTERNAL MEDICINE

## 2020-10-19 PROCEDURE — 90686 IIV4 VACC NO PRSV 0.5 ML IM: CPT | Mod: S$GLB,,, | Performed by: INTERNAL MEDICINE

## 2020-10-19 PROCEDURE — 90686 FLU VACCINE (QUAD) GREATER THAN OR EQUAL TO 3YO PRESERVATIVE FREE IM: ICD-10-PCS | Mod: S$GLB,,, | Performed by: INTERNAL MEDICINE

## 2020-10-19 PROCEDURE — 90471 FLU VACCINE (QUAD) GREATER THAN OR EQUAL TO 3YO PRESERVATIVE FREE IM: ICD-10-PCS | Mod: S$GLB,,, | Performed by: INTERNAL MEDICINE

## 2020-10-29 RX ORDER — POTASSIUM CHLORIDE 750 MG/1
TABLET, EXTENDED RELEASE ORAL
Qty: 180 TABLET | Refills: 1 | Status: SHIPPED | OUTPATIENT
Start: 2020-10-29 | End: 2021-03-25 | Stop reason: SDUPTHER

## 2020-12-18 ENCOUNTER — OFFICE VISIT (OUTPATIENT)
Dept: CARDIOLOGY | Facility: CLINIC | Age: 60
End: 2020-12-18
Payer: COMMERCIAL

## 2020-12-18 VITALS
WEIGHT: 230 LBS | HEART RATE: 96 BPM | OXYGEN SATURATION: 96 % | BODY MASS INDEX: 32.93 KG/M2 | RESPIRATION RATE: 16 BRPM | HEIGHT: 70 IN | DIASTOLIC BLOOD PRESSURE: 80 MMHG | SYSTOLIC BLOOD PRESSURE: 130 MMHG

## 2020-12-18 DIAGNOSIS — I10 BENIGN ESSENTIAL HYPERTENSION: ICD-10-CM

## 2020-12-18 DIAGNOSIS — I48.11 LONGSTANDING PERSISTENT ATRIAL FIBRILLATION: Primary | ICD-10-CM

## 2020-12-18 DIAGNOSIS — E78.2 MIXED DYSLIPIDEMIA: ICD-10-CM

## 2020-12-18 PROCEDURE — 99213 PR OFFICE/OUTPT VISIT, EST, LEVL III, 20-29 MIN: ICD-10-PCS | Mod: S$GLB,,, | Performed by: INTERNAL MEDICINE

## 2020-12-18 PROCEDURE — 3079F DIAST BP 80-89 MM HG: CPT | Mod: CPTII,S$GLB,, | Performed by: INTERNAL MEDICINE

## 2020-12-18 PROCEDURE — 3075F SYST BP GE 130 - 139MM HG: CPT | Mod: CPTII,S$GLB,, | Performed by: INTERNAL MEDICINE

## 2020-12-18 PROCEDURE — 99213 OFFICE O/P EST LOW 20 MIN: CPT | Mod: S$GLB,,, | Performed by: INTERNAL MEDICINE

## 2020-12-18 PROCEDURE — 3008F BODY MASS INDEX DOCD: CPT | Mod: CPTII,S$GLB,, | Performed by: INTERNAL MEDICINE

## 2020-12-18 PROCEDURE — 3008F PR BODY MASS INDEX (BMI) DOCUMENTED: ICD-10-PCS | Mod: CPTII,S$GLB,, | Performed by: INTERNAL MEDICINE

## 2020-12-18 PROCEDURE — 3075F PR MOST RECENT SYSTOLIC BLOOD PRESS GE 130-139MM HG: ICD-10-PCS | Mod: CPTII,S$GLB,, | Performed by: INTERNAL MEDICINE

## 2020-12-18 PROCEDURE — 1126F PR PAIN SEVERITY QUANTIFIED, NO PAIN PRESENT: ICD-10-PCS | Mod: S$GLB,,, | Performed by: INTERNAL MEDICINE

## 2020-12-18 PROCEDURE — 1126F AMNT PAIN NOTED NONE PRSNT: CPT | Mod: S$GLB,,, | Performed by: INTERNAL MEDICINE

## 2020-12-18 PROCEDURE — 3079F PR MOST RECENT DIASTOLIC BLOOD PRESSURE 80-89 MM HG: ICD-10-PCS | Mod: CPTII,S$GLB,, | Performed by: INTERNAL MEDICINE

## 2020-12-18 NOTE — PROGRESS NOTES
Subjective:    Patient ID:  Howard Segura is a 60 y.o. male     HPI  Patient presents to the clinic today for follow-up of his hypertension, atrial fibrillation.  Since last clinic visit he denies any hospitalizations or emergency room visits.  Is doing a lot better than his previous visit.  He denies any chest pain, shortness of breath, palpitations, lightheadedness, dizziness or any syncopal episodes.  He denies any bleeding issues.  He denies any symptoms suggestive of stroke or TIA.  His labs from September with reviewed and were discussed with the patient.    Current Outpatient Medications   Medication Instructions    amlodipine-atorvastatin (CADUET) 5-20 mg per tablet 1 tablet, Oral, Daily    clotrimazole-betamethasone 1-0.05% (LOTRISONE) cream Topical (Top), 2 times daily, Apply to affected area-use sparingly.  Possible side effect of overdose of steroid which is in the cream.    ELIQUIS 5 mg Tab TAKE 1 TABLET BY MOUTH TWICE DAILY    metoprolol tartrate (LOPRESSOR) 100 mg, Oral, 2 times daily    omeprazole (PRILOSEC OTC) 20 MG tablet 1 tablet, Oral, Daily    potassium chloride SA (K-DUR,KLOR-CON) 10 MEQ tablet TAKE 1 TABLET(10 MEQ) BY MOUTH TWICE DAILY    triamterene-hydrochlorothiazide 37.5-25 mg (MAXZIDE-25) 37.5-25 mg per tablet TAKE 1 TABLET BY MOUTH EVERY DAY        Review of Systems   Constitution: Negative for decreased appetite, fever, malaise/fatigue, weight gain and weight loss.   HENT: Negative for ear pain and sore throat.    Eyes: Negative for double vision and pain.   Cardiovascular: Negative for chest pain, dyspnea on exertion and leg swelling.   Respiratory: Negative for cough and hemoptysis.    Endocrine: Negative for heat intolerance and polydipsia.   Hematologic/Lymphatic: Negative for bleeding problem.   Skin: Negative for rash.   Musculoskeletal: Negative for stiffness.   Gastrointestinal: Negative for abdominal pain, jaundice and vomiting.   Genitourinary: Negative for dysuria.  "  Neurological: Negative for seizures and tremors.   Psychiatric/Behavioral: Negative for altered mental status, hallucinations and suicidal ideas.        Objective:     Vitals:    12/18/20 1016   BP: 130/80   BP Location: Left arm   Patient Position: Sitting   BP Method: Medium (Manual)   Pulse: 96   Resp: 16   SpO2: 96%   Weight: 104.3 kg (230 lb)   Height: 5' 10" (1.778 m)       Physical Exam   Constitutional: He is oriented to person, place, and time. He appears well-developed and well-nourished.   HENT:   Head: Normocephalic and atraumatic.   Eyes: Pupils are equal, round, and reactive to light. Conjunctivae are normal.   Neck: Neck supple. No JVD present.   Cardiovascular: Normal rate, S1 normal, S2 normal and normal heart sounds. An irregularly irregular rhythm present. Exam reveals no gallop and no friction rub.   No murmur heard.  Pulses:       Carotid pulses are 2+ on the right side and 2+ on the left side.       Posterior tibial pulses are 2+ on the right side and 2+ on the left side.   Pulmonary/Chest: No stridor. No respiratory distress. He has no wheezes. He has no rales.   Abdominal: Soft. He exhibits no distension. There is no abdominal tenderness.   Musculoskeletal:         General: No edema.   Neurological: He is alert and oriented to person, place, and time.   Skin: Skin is warm and dry. No burn and no rash noted. No cyanosis. Nails show no clubbing.   Psychiatric: His behavior is normal. He expresses no suicidal ideation.        Results for orders placed during the hospital encounter of 12/22/19   Echo Color Flow Doppler? Yes; Bubble Contrast? Yes    Narrative · Concentric left ventricular remodeling.  · Normal left ventricular systolic function. The estimated ejection   fraction is 61%  · Normal LV diastolic function.  · Normal right ventricular systolic function.  · Moderate left atrial enlargement.  · No pulmonary hypertension present.  · Mild tricuspid regurgitation.  · Normal central venous " pressure (3 mm Hg).  · The estimated PA systolic pressure is 31 mm Hg     Atrial fib,  Left atrial enlargement   lvh         Results for orders placed during the hospital encounter of 06/05/20   Intra-Procedure Documentation    Narrative GALE performed in the Invasive Lab    - See Procedure Log link below for nursing documentation    - See GALE order on Card Proc Tab for physician findings           Assessment:       Problem List Items Addressed This Visit        Cardiac/Vascular    Atrial fibrillation - Primary    Benign essential hypertension    Mixed dyslipidemia            Plan:       1. Continue current medical regimen without any changes.  2.  Return to the clinic in about 6 months or sooner if needed.  3.  Check CBC, CMP, fasting lipid profile and magnesium prior to the next clinic visit.

## 2021-01-19 ENCOUNTER — PATIENT MESSAGE (OUTPATIENT)
Dept: FAMILY MEDICINE | Facility: CLINIC | Age: 61
End: 2021-01-19

## 2021-01-19 DIAGNOSIS — E78.49 FAMILIAL COMBINED HYPERLIPIDEMIA: ICD-10-CM

## 2021-01-19 DIAGNOSIS — I10 BENIGN ESSENTIAL HYPERTENSION: ICD-10-CM

## 2021-01-19 RX ORDER — AMLODIPINE BESYLATE AND ATORVASTATIN CALCIUM 5; 20 MG/1; MG/1
1 TABLET, FILM COATED ORAL DAILY
Qty: 90 TABLET | Refills: 1 | Status: SHIPPED | OUTPATIENT
Start: 2021-01-19 | End: 2021-07-12 | Stop reason: SDUPTHER

## 2021-03-01 RX ORDER — TRIAMTERENE/HYDROCHLOROTHIAZID 37.5-25 MG
1 TABLET ORAL DAILY
Qty: 90 TABLET | Refills: 3 | Status: SHIPPED | OUTPATIENT
Start: 2021-03-01 | End: 2022-01-31 | Stop reason: SDUPTHER

## 2021-03-25 DIAGNOSIS — R21 RASH: ICD-10-CM

## 2021-03-26 RX ORDER — POTASSIUM CHLORIDE 750 MG/1
TABLET, EXTENDED RELEASE ORAL
Qty: 180 TABLET | Refills: 1 | Status: SHIPPED | OUTPATIENT
Start: 2021-03-26 | End: 2021-03-31 | Stop reason: ALTCHOICE

## 2021-03-26 RX ORDER — METOPROLOL TARTRATE 100 MG/1
100 TABLET ORAL 2 TIMES DAILY
Qty: 180 TABLET | Refills: 2 | Status: SHIPPED | OUTPATIENT
Start: 2021-03-26 | End: 2022-03-07 | Stop reason: SDUPTHER

## 2021-03-26 RX ORDER — CLOTRIMAZOLE AND BETAMETHASONE DIPROPIONATE 10; .64 MG/G; MG/G
CREAM TOPICAL 2 TIMES DAILY
Qty: 45 G | Refills: 0 | Status: SHIPPED | OUTPATIENT
Start: 2021-03-26 | End: 2021-12-06 | Stop reason: SDUPTHER

## 2021-03-31 ENCOUNTER — OFFICE VISIT (OUTPATIENT)
Dept: FAMILY MEDICINE | Facility: CLINIC | Age: 61
End: 2021-03-31
Payer: COMMERCIAL

## 2021-03-31 ENCOUNTER — PATIENT MESSAGE (OUTPATIENT)
Dept: FAMILY MEDICINE | Facility: CLINIC | Age: 61
End: 2021-03-31

## 2021-03-31 VITALS
BODY MASS INDEX: 33.21 KG/M2 | HEIGHT: 70 IN | HEART RATE: 84 BPM | SYSTOLIC BLOOD PRESSURE: 134 MMHG | WEIGHT: 232 LBS | DIASTOLIC BLOOD PRESSURE: 98 MMHG

## 2021-03-31 DIAGNOSIS — Z79.01 ANTICOAGULATED: ICD-10-CM

## 2021-03-31 DIAGNOSIS — I48.0 PAROXYSMAL ATRIAL FIBRILLATION: ICD-10-CM

## 2021-03-31 DIAGNOSIS — L30.9 DERMATITIS: ICD-10-CM

## 2021-03-31 DIAGNOSIS — I10 BENIGN ESSENTIAL HYPERTENSION: Primary | ICD-10-CM

## 2021-03-31 DIAGNOSIS — E78.49 FAMILIAL COMBINED HYPERLIPIDEMIA: ICD-10-CM

## 2021-03-31 DIAGNOSIS — R53.81 GENERAL DETERIORATION OF HEALTH: ICD-10-CM

## 2021-03-31 PROCEDURE — 99214 PR OFFICE/OUTPT VISIT, EST, LEVL IV, 30-39 MIN: ICD-10-PCS | Mod: S$GLB,,, | Performed by: INTERNAL MEDICINE

## 2021-03-31 PROCEDURE — 3008F PR BODY MASS INDEX (BMI) DOCUMENTED: ICD-10-PCS | Mod: S$GLB,,, | Performed by: INTERNAL MEDICINE

## 2021-03-31 PROCEDURE — 3075F SYST BP GE 130 - 139MM HG: CPT | Mod: S$GLB,,, | Performed by: INTERNAL MEDICINE

## 2021-03-31 PROCEDURE — 3080F DIAST BP >= 90 MM HG: CPT | Mod: S$GLB,,, | Performed by: INTERNAL MEDICINE

## 2021-03-31 PROCEDURE — 3075F PR MOST RECENT SYSTOLIC BLOOD PRESS GE 130-139MM HG: ICD-10-PCS | Mod: S$GLB,,, | Performed by: INTERNAL MEDICINE

## 2021-03-31 PROCEDURE — 3080F PR MOST RECENT DIASTOLIC BLOOD PRESSURE >= 90 MM HG: ICD-10-PCS | Mod: S$GLB,,, | Performed by: INTERNAL MEDICINE

## 2021-03-31 PROCEDURE — 99214 OFFICE O/P EST MOD 30 MIN: CPT | Mod: S$GLB,,, | Performed by: INTERNAL MEDICINE

## 2021-03-31 PROCEDURE — 1126F AMNT PAIN NOTED NONE PRSNT: CPT | Mod: S$GLB,,, | Performed by: INTERNAL MEDICINE

## 2021-03-31 PROCEDURE — 1126F PR PAIN SEVERITY QUANTIFIED, NO PAIN PRESENT: ICD-10-PCS | Mod: S$GLB,,, | Performed by: INTERNAL MEDICINE

## 2021-03-31 PROCEDURE — 3008F BODY MASS INDEX DOCD: CPT | Mod: S$GLB,,, | Performed by: INTERNAL MEDICINE

## 2021-03-31 RX ORDER — POTASSIUM CHLORIDE 750 MG/1
10 CAPSULE, EXTENDED RELEASE ORAL 2 TIMES DAILY
Qty: 180 CAPSULE | Refills: 3 | Status: SHIPPED | OUTPATIENT
Start: 2021-03-31 | End: 2022-03-10 | Stop reason: SDUPTHER

## 2021-03-31 RX ORDER — TRIAMCINOLONE ACETONIDE 1 MG/G
CREAM TOPICAL 2 TIMES DAILY
Qty: 45 G | Refills: 1 | Status: SHIPPED | OUTPATIENT
Start: 2021-03-31 | End: 2023-05-15

## 2021-04-09 NOTE — PT/OT/SLP PROGRESS
Physical Therapy Treatment    Patient Name:  Howard Segura   MRN:  [id number]    Recommendations:     Discharge Recommendations:  home health PT   Discharge Equipment Recommendations: bedside commode, shower chair, walker, rolling   Barriers to discharge: None    Assessment:     Howard Segura is a 59 y.o. male admitted with a medical diagnosis of Closed fracture of neck of right femur.  He presents with the following impairments/functional limitations:  weakness, pain, impaired functional mobilty, gait instability, decreased coordination, decreased lower extremity function. Pt agreeable to therapy and reports his back is hurting him more than his hip. Pt t/f to sitting EOB with Mod A, pt screaming out in pain ~10 min. Pt sit<>stand with CGA and gait of 90' SBA with RW.     Rehab Prognosis: Good; patient would benefit from acute skilled PT services to address these deficits and reach maximum level of function.    Recent Surgery: Procedure(s) (LRB):  ORIF, HIP (Right) 2 Days Post-Op    Plan:     During this hospitalization, patient to be seen BID to address the identified rehab impairments via gait training, therapeutic activities, therapeutic exercises and progress toward the following goals:    · Plan of Care Expires:  01/06/20    Subjective     Chief Complaint: Back pain!!  Patient/Family Comments/goals: To go home today if he is ready.   Pain/Comfort:  · Pain Rating 1: 10/10  · Location - Side 1: Right  · Location - Orientation 1: posterior  · Location 1: back  · Pain Addressed 1: Pre-medicate for activity, Reposition, Distraction      Objective:     Communicated with nurse Griggs prior to session.  Patient found HOB elevated with peripheral IV, telemetry upon PT entry to room.     General Precautions: Standard, fall   Orthopedic Precautions:RLE partial weight bearing   Braces: N/A     Functional Mobility:  · Bed Mobility:     · Rolling Left:  moderate assistance  · Scooting: moderate assistance  · Supine to Sit:  moderate assistance. Screaming in pain due to back. ~ 15 min to get to sitting EOB.   · Transfers:     · Sit to Stand:  contact guard assistance with rolling walker  · Gait: 90' SBA with RW and IV. No LOB. RLE PWB. Pt increased ROM and muscle faciliation during gait.       AM-PAC 6 CLICK MOBILITY          Therapeutic Activities and Exercises:   Pt states he has been performing his exercises while in bed.   Back pain limits pt during transfers due to muscle spasms. Once pt is up, he moves well.     Patient left up in chair with all lines intact, call button in reach, nurse Anson  notified and wife present..    GOALS:   Multidisciplinary Problems     Physical Therapy Goals        Problem: Physical Therapy Goal    Goal Priority Disciplines Outcome Goal Variances Interventions   Physical Therapy Goal     PT, PT/OT Ongoing, Progressing     Description:  Patient will increase functional independence with mobility PWB (R)LE by performin. Supine to sit with Stand-by Assistance  2. Sit to stand transfer with Stand-by Assistance  3. Bed to chair transfer with Stand-by Assistance using Rolling Walker  4. Gait  x 250 feet with Stand-by Assistance using Rolling Walker.   5. Lower extremity exercise program x 30 reps per handout, with supervision                      Time Tracking:     PT Received On: 19  PT Start Time: 837     PT Stop Time: 929  PT Total Time (min): 52 min     Billable Minutes: Gait Training 26 minutes and Therapeutic Activity 26 minutes    Treatment Type: Treatment  PT/PTA: PTA     PTA Visit Number: Bronwyn     Macy Dahlia, PTA  2019   Imaging Studies

## 2021-04-19 ENCOUNTER — PATIENT MESSAGE (OUTPATIENT)
Dept: FAMILY MEDICINE | Facility: CLINIC | Age: 61
End: 2021-04-19

## 2021-06-03 ENCOUNTER — PATIENT MESSAGE (OUTPATIENT)
Dept: FAMILY MEDICINE | Facility: CLINIC | Age: 61
End: 2021-06-03

## 2021-06-03 DIAGNOSIS — L08.9 SKIN INFECTION: Primary | ICD-10-CM

## 2021-06-03 RX ORDER — MUPIROCIN 20 MG/G
OINTMENT TOPICAL 3 TIMES DAILY
Qty: 22 G | Refills: 0 | Status: SHIPPED | OUTPATIENT
Start: 2021-06-03 | End: 2022-04-05

## 2021-07-12 DIAGNOSIS — I10 BENIGN ESSENTIAL HYPERTENSION: ICD-10-CM

## 2021-07-12 DIAGNOSIS — E78.49 FAMILIAL COMBINED HYPERLIPIDEMIA: ICD-10-CM

## 2021-07-12 RX ORDER — AMLODIPINE BESYLATE AND ATORVASTATIN CALCIUM 5; 20 MG/1; MG/1
1 TABLET, FILM COATED ORAL DAILY
Qty: 90 TABLET | Refills: 1 | Status: SHIPPED | OUTPATIENT
Start: 2021-07-12 | End: 2022-01-31 | Stop reason: SDUPTHER

## 2021-08-04 ENCOUNTER — PATIENT MESSAGE (OUTPATIENT)
Dept: FAMILY MEDICINE | Facility: CLINIC | Age: 61
End: 2021-08-04

## 2021-08-04 DIAGNOSIS — R53.81 GENERAL DETERIORATION OF HEALTH: ICD-10-CM

## 2021-09-07 ENCOUNTER — OFFICE VISIT (OUTPATIENT)
Dept: URGENT CARE | Facility: CLINIC | Age: 61
End: 2021-09-07
Payer: COMMERCIAL

## 2021-09-07 VITALS
HEIGHT: 72 IN | HEART RATE: 72 BPM | TEMPERATURE: 98 F | RESPIRATION RATE: 16 BRPM | DIASTOLIC BLOOD PRESSURE: 99 MMHG | SYSTOLIC BLOOD PRESSURE: 145 MMHG | WEIGHT: 225 LBS | BODY MASS INDEX: 30.48 KG/M2 | OXYGEN SATURATION: 99 %

## 2021-09-07 DIAGNOSIS — J01.90 ACUTE BACTERIAL RHINOSINUSITIS: Primary | ICD-10-CM

## 2021-09-07 DIAGNOSIS — R09.82 POSTNASAL DRIP: ICD-10-CM

## 2021-09-07 DIAGNOSIS — R05.9 COUGH: ICD-10-CM

## 2021-09-07 DIAGNOSIS — Z20.822 ENCOUNTER FOR LABORATORY TESTING FOR COVID-19 VIRUS: ICD-10-CM

## 2021-09-07 DIAGNOSIS — B96.89 ACUTE BACTERIAL RHINOSINUSITIS: Primary | ICD-10-CM

## 2021-09-07 DIAGNOSIS — Z20.822 COVID-19 VIRUS NOT DETECTED: ICD-10-CM

## 2021-09-07 LAB
CTP QC/QA: YES
SARS-COV-2 RDRP RESP QL NAA+PROBE: NEGATIVE

## 2021-09-07 PROCEDURE — 3077F SYST BP >= 140 MM HG: CPT | Mod: CPTII,S$GLB,, | Performed by: NURSE PRACTITIONER

## 2021-09-07 PROCEDURE — 99203 PR OFFICE/OUTPT VISIT, NEW, LEVL III, 30-44 MIN: ICD-10-PCS | Mod: S$GLB,,, | Performed by: NURSE PRACTITIONER

## 2021-09-07 PROCEDURE — 3080F PR MOST RECENT DIASTOLIC BLOOD PRESSURE >= 90 MM HG: ICD-10-PCS | Mod: CPTII,S$GLB,, | Performed by: NURSE PRACTITIONER

## 2021-09-07 PROCEDURE — 1160F RVW MEDS BY RX/DR IN RCRD: CPT | Mod: CPTII,S$GLB,, | Performed by: NURSE PRACTITIONER

## 2021-09-07 PROCEDURE — 3008F BODY MASS INDEX DOCD: CPT | Mod: CPTII,S$GLB,, | Performed by: NURSE PRACTITIONER

## 2021-09-07 PROCEDURE — U0005 INFEC AGEN DETEC AMPLI PROBE: HCPCS | Performed by: NURSE PRACTITIONER

## 2021-09-07 PROCEDURE — U0003 INFECTIOUS AGENT DETECTION BY NUCLEIC ACID (DNA OR RNA); SEVERE ACUTE RESPIRATORY SYNDROME CORONAVIRUS 2 (SARS-COV-2) (CORONAVIRUS DISEASE [COVID-19]), AMPLIFIED PROBE TECHNIQUE, MAKING USE OF HIGH THROUGHPUT TECHNOLOGIES AS DESCRIBED BY CMS-2020-01-R: HCPCS | Performed by: NURSE PRACTITIONER

## 2021-09-07 PROCEDURE — U0002 COVID-19 LAB TEST NON-CDC: HCPCS | Mod: QW,S$GLB,, | Performed by: NURSE PRACTITIONER

## 2021-09-07 PROCEDURE — 3080F DIAST BP >= 90 MM HG: CPT | Mod: CPTII,S$GLB,, | Performed by: NURSE PRACTITIONER

## 2021-09-07 PROCEDURE — 3077F PR MOST RECENT SYSTOLIC BLOOD PRESSURE >= 140 MM HG: ICD-10-PCS | Mod: CPTII,S$GLB,, | Performed by: NURSE PRACTITIONER

## 2021-09-07 PROCEDURE — 3008F PR BODY MASS INDEX (BMI) DOCUMENTED: ICD-10-PCS | Mod: CPTII,S$GLB,, | Performed by: NURSE PRACTITIONER

## 2021-09-07 PROCEDURE — 1159F PR MEDICATION LIST DOCUMENTED IN MEDICAL RECORD: ICD-10-PCS | Mod: CPTII,S$GLB,, | Performed by: NURSE PRACTITIONER

## 2021-09-07 PROCEDURE — U0002: ICD-10-PCS | Mod: QW,S$GLB,, | Performed by: NURSE PRACTITIONER

## 2021-09-07 PROCEDURE — 99203 OFFICE O/P NEW LOW 30 MIN: CPT | Mod: S$GLB,,, | Performed by: NURSE PRACTITIONER

## 2021-09-07 PROCEDURE — 1160F PR REVIEW ALL MEDS BY PRESCRIBER/CLIN PHARMACIST DOCUMENTED: ICD-10-PCS | Mod: CPTII,S$GLB,, | Performed by: NURSE PRACTITIONER

## 2021-09-07 PROCEDURE — 1159F MED LIST DOCD IN RCRD: CPT | Mod: CPTII,S$GLB,, | Performed by: NURSE PRACTITIONER

## 2021-09-07 RX ORDER — DOXYCYCLINE 100 MG/1
100 CAPSULE ORAL EVERY 12 HOURS
Qty: 14 CAPSULE | Refills: 0 | Status: SHIPPED | OUTPATIENT
Start: 2021-09-07 | End: 2021-09-14

## 2021-09-07 RX ORDER — PROMETHAZINE HYDROCHLORIDE AND CODEINE PHOSPHATE 6.25; 1 MG/5ML; MG/5ML
5 SOLUTION ORAL EVERY 6 HOURS PRN
Qty: 118 ML | Refills: 0 | Status: SHIPPED | OUTPATIENT
Start: 2021-09-07 | End: 2021-10-22 | Stop reason: ALTCHOICE

## 2021-09-08 ENCOUNTER — TELEPHONE (OUTPATIENT)
Dept: URGENT CARE | Facility: CLINIC | Age: 61
End: 2021-09-08

## 2021-09-08 LAB
SARS-COV-2 RNA RESP QL NAA+PROBE: NOT DETECTED
SARS-COV-2- CYCLE NUMBER: NORMAL

## 2021-09-16 ENCOUNTER — OFFICE VISIT (OUTPATIENT)
Dept: URGENT CARE | Facility: CLINIC | Age: 61
End: 2021-09-16
Payer: COMMERCIAL

## 2021-09-16 VITALS
HEART RATE: 61 BPM | DIASTOLIC BLOOD PRESSURE: 77 MMHG | TEMPERATURE: 98 F | BODY MASS INDEX: 29.8 KG/M2 | RESPIRATION RATE: 16 BRPM | HEIGHT: 72 IN | OXYGEN SATURATION: 97 % | SYSTOLIC BLOOD PRESSURE: 113 MMHG | WEIGHT: 220 LBS

## 2021-09-16 DIAGNOSIS — J01.11 ACUTE RECURRENT FRONTAL SINUSITIS: Primary | ICD-10-CM

## 2021-09-16 PROCEDURE — 3074F PR MOST RECENT SYSTOLIC BLOOD PRESSURE < 130 MM HG: ICD-10-PCS | Mod: CPTII,S$GLB,, | Performed by: NURSE PRACTITIONER

## 2021-09-16 PROCEDURE — 3008F BODY MASS INDEX DOCD: CPT | Mod: CPTII,S$GLB,, | Performed by: NURSE PRACTITIONER

## 2021-09-16 PROCEDURE — 3074F SYST BP LT 130 MM HG: CPT | Mod: CPTII,S$GLB,, | Performed by: NURSE PRACTITIONER

## 2021-09-16 PROCEDURE — 3008F PR BODY MASS INDEX (BMI) DOCUMENTED: ICD-10-PCS | Mod: CPTII,S$GLB,, | Performed by: NURSE PRACTITIONER

## 2021-09-16 PROCEDURE — 99213 PR OFFICE/OUTPT VISIT, EST, LEVL III, 20-29 MIN: ICD-10-PCS | Mod: 25,S$GLB,, | Performed by: NURSE PRACTITIONER

## 2021-09-16 PROCEDURE — 1159F PR MEDICATION LIST DOCUMENTED IN MEDICAL RECORD: ICD-10-PCS | Mod: CPTII,S$GLB,, | Performed by: NURSE PRACTITIONER

## 2021-09-16 PROCEDURE — 99213 OFFICE O/P EST LOW 20 MIN: CPT | Mod: 25,S$GLB,, | Performed by: NURSE PRACTITIONER

## 2021-09-16 PROCEDURE — 3078F PR MOST RECENT DIASTOLIC BLOOD PRESSURE < 80 MM HG: ICD-10-PCS | Mod: CPTII,S$GLB,, | Performed by: NURSE PRACTITIONER

## 2021-09-16 PROCEDURE — 1160F PR REVIEW ALL MEDS BY PRESCRIBER/CLIN PHARMACIST DOCUMENTED: ICD-10-PCS | Mod: CPTII,S$GLB,, | Performed by: NURSE PRACTITIONER

## 2021-09-16 PROCEDURE — 96372 PR INJECTION,THERAP/PROPH/DIAG2ST, IM OR SUBCUT: ICD-10-PCS | Mod: S$GLB,,, | Performed by: NURSE PRACTITIONER

## 2021-09-16 PROCEDURE — 1160F RVW MEDS BY RX/DR IN RCRD: CPT | Mod: CPTII,S$GLB,, | Performed by: NURSE PRACTITIONER

## 2021-09-16 PROCEDURE — 3078F DIAST BP <80 MM HG: CPT | Mod: CPTII,S$GLB,, | Performed by: NURSE PRACTITIONER

## 2021-09-16 PROCEDURE — 96372 THER/PROPH/DIAG INJ SC/IM: CPT | Mod: S$GLB,,, | Performed by: NURSE PRACTITIONER

## 2021-09-16 PROCEDURE — 1159F MED LIST DOCD IN RCRD: CPT | Mod: CPTII,S$GLB,, | Performed by: NURSE PRACTITIONER

## 2021-09-16 RX ORDER — DEXAMETHASONE SODIUM PHOSPHATE 4 MG/ML
8 INJECTION, SOLUTION INTRA-ARTICULAR; INTRALESIONAL; INTRAMUSCULAR; INTRAVENOUS; SOFT TISSUE
Status: COMPLETED | OUTPATIENT
Start: 2021-09-16 | End: 2021-09-16

## 2021-09-16 RX ORDER — DOXYCYCLINE 100 MG/1
100 CAPSULE ORAL 2 TIMES DAILY
Qty: 14 CAPSULE | Refills: 0 | Status: SHIPPED | OUTPATIENT
Start: 2021-09-16 | End: 2021-09-23

## 2021-09-16 RX ORDER — PREDNISONE 20 MG/1
20 TABLET ORAL 2 TIMES DAILY
Qty: 8 TABLET | Refills: 0 | Status: SHIPPED | OUTPATIENT
Start: 2021-09-17 | End: 2021-09-21

## 2021-09-16 RX ADMIN — DEXAMETHASONE SODIUM PHOSPHATE 8 MG: 4 INJECTION, SOLUTION INTRA-ARTICULAR; INTRALESIONAL; INTRAMUSCULAR; INTRAVENOUS; SOFT TISSUE at 03:09

## 2021-09-17 ENCOUNTER — TELEPHONE (OUTPATIENT)
Dept: FAMILY MEDICINE | Facility: CLINIC | Age: 61
End: 2021-09-17

## 2021-09-17 DIAGNOSIS — I10 BENIGN ESSENTIAL HYPERTENSION: Primary | ICD-10-CM

## 2021-09-17 DIAGNOSIS — I48.0 PAROXYSMAL ATRIAL FIBRILLATION: ICD-10-CM

## 2021-09-17 DIAGNOSIS — Z12.5 SCREENING FOR PROSTATE CANCER: ICD-10-CM

## 2021-09-17 DIAGNOSIS — Z79.01 ANTICOAGULATED: ICD-10-CM

## 2021-09-20 ENCOUNTER — PATIENT MESSAGE (OUTPATIENT)
Dept: FAMILY MEDICINE | Facility: CLINIC | Age: 61
End: 2021-09-20

## 2021-09-20 DIAGNOSIS — Z20.822 EXPOSURE TO COVID-19 VIRUS: ICD-10-CM

## 2021-09-27 ENCOUNTER — PATIENT MESSAGE (OUTPATIENT)
Dept: FAMILY MEDICINE | Facility: CLINIC | Age: 61
End: 2021-09-27

## 2021-10-01 ENCOUNTER — LAB VISIT (OUTPATIENT)
Dept: LAB | Facility: HOSPITAL | Age: 61
End: 2021-10-01
Attending: INTERNAL MEDICINE
Payer: COMMERCIAL

## 2021-10-01 DIAGNOSIS — Z12.5 SCREENING FOR PROSTATE CANCER: ICD-10-CM

## 2021-10-01 DIAGNOSIS — Z79.01 ANTICOAGULATED: ICD-10-CM

## 2021-10-01 DIAGNOSIS — I48.0 PAROXYSMAL ATRIAL FIBRILLATION: ICD-10-CM

## 2021-10-01 DIAGNOSIS — I10 BENIGN ESSENTIAL HYPERTENSION: ICD-10-CM

## 2021-10-01 DIAGNOSIS — Z20.822 EXPOSURE TO COVID-19 VIRUS: ICD-10-CM

## 2021-10-01 LAB
ALBUMIN SERPL BCP-MCNC: 4.2 G/DL (ref 3.5–5.2)
ALBUMIN/CREAT UR: 5.3 UG/MG (ref 0–30)
ALP SERPL-CCNC: 61 U/L (ref 55–135)
ALT SERPL W/O P-5'-P-CCNC: 33 U/L (ref 10–44)
ANION GAP SERPL CALC-SCNC: 9 MMOL/L (ref 8–16)
AST SERPL-CCNC: 24 U/L (ref 10–40)
BASOPHILS # BLD AUTO: 0.06 K/UL (ref 0–0.2)
BASOPHILS NFR BLD: 1 % (ref 0–1.9)
BILIRUB SERPL-MCNC: 1.1 MG/DL (ref 0.1–1)
BUN SERPL-MCNC: 12 MG/DL (ref 6–20)
CALCIUM SERPL-MCNC: 9.1 MG/DL (ref 8.7–10.5)
CHLORIDE SERPL-SCNC: 102 MMOL/L (ref 95–110)
CHOLEST SERPL-MCNC: 162 MG/DL (ref 120–199)
CHOLEST/HDLC SERPL: 4.9 {RATIO} (ref 2–5)
CO2 SERPL-SCNC: 28 MMOL/L (ref 23–29)
COMPLEXED PSA SERPL-MCNC: 2.3 NG/ML (ref 0–4)
CREAT SERPL-MCNC: 1 MG/DL (ref 0.5–1.4)
CREAT UR-MCNC: 109 MG/DL (ref 23–375)
DIFFERENTIAL METHOD: ABNORMAL
EOSINOPHIL # BLD AUTO: 0.2 K/UL (ref 0–0.5)
EOSINOPHIL NFR BLD: 3 % (ref 0–8)
ERYTHROCYTE [DISTWIDTH] IN BLOOD BY AUTOMATED COUNT: 12.9 % (ref 11.5–14.5)
EST. GFR  (AFRICAN AMERICAN): >60 ML/MIN/1.73 M^2
EST. GFR  (NON AFRICAN AMERICAN): >60 ML/MIN/1.73 M^2
GLUCOSE SERPL-MCNC: 127 MG/DL (ref 70–110)
HCT VFR BLD AUTO: 40.1 % (ref 40–54)
HDLC SERPL-MCNC: 33 MG/DL (ref 40–75)
HDLC SERPL: 20.4 % (ref 20–50)
HGB BLD-MCNC: 14.3 G/DL (ref 14–18)
IMM GRANULOCYTES # BLD AUTO: 0.02 K/UL (ref 0–0.04)
IMM GRANULOCYTES NFR BLD AUTO: 0.3 % (ref 0–0.5)
LDLC SERPL CALC-MCNC: 61 MG/DL (ref 63–159)
LYMPHOCYTES # BLD AUTO: 1.4 K/UL (ref 1–4.8)
LYMPHOCYTES NFR BLD: 24 % (ref 18–48)
MAGNESIUM SERPL-MCNC: 1.6 MG/DL (ref 1.6–2.6)
MCH RBC QN AUTO: 31.4 PG (ref 27–31)
MCHC RBC AUTO-ENTMCNC: 35.7 G/DL (ref 32–36)
MCV RBC AUTO: 88 FL (ref 82–98)
MICROALBUMIN UR DL<=1MG/L-MCNC: 5.8 UG/ML
MONOCYTES # BLD AUTO: 0.6 K/UL (ref 0.3–1)
MONOCYTES NFR BLD: 9.3 % (ref 4–15)
NEUTROPHILS # BLD AUTO: 3.7 K/UL (ref 1.8–7.7)
NEUTROPHILS NFR BLD: 62.4 % (ref 38–73)
NONHDLC SERPL-MCNC: 129 MG/DL
NRBC BLD-RTO: 0 /100 WBC
PLATELET # BLD AUTO: 146 K/UL (ref 150–450)
PMV BLD AUTO: 9.6 FL (ref 9.2–12.9)
POTASSIUM SERPL-SCNC: 3.9 MMOL/L (ref 3.5–5.1)
PROT SERPL-MCNC: 6.7 G/DL (ref 6–8.4)
RBC # BLD AUTO: 4.56 M/UL (ref 4.6–6.2)
SODIUM SERPL-SCNC: 139 MMOL/L (ref 136–145)
TRIGL SERPL-MCNC: 340 MG/DL (ref 30–150)
WBC # BLD AUTO: 6 K/UL (ref 3.9–12.7)

## 2021-10-01 PROCEDURE — 83735 ASSAY OF MAGNESIUM: CPT | Performed by: INTERNAL MEDICINE

## 2021-10-01 PROCEDURE — 86769 SARS-COV-2 COVID-19 ANTIBODY: CPT | Performed by: INTERNAL MEDICINE

## 2021-10-01 PROCEDURE — 85025 COMPLETE CBC W/AUTO DIFF WBC: CPT | Performed by: INTERNAL MEDICINE

## 2021-10-01 PROCEDURE — 80053 COMPREHEN METABOLIC PANEL: CPT | Performed by: INTERNAL MEDICINE

## 2021-10-01 PROCEDURE — 36415 COLL VENOUS BLD VENIPUNCTURE: CPT | Performed by: INTERNAL MEDICINE

## 2021-10-01 PROCEDURE — 84153 ASSAY OF PSA TOTAL: CPT | Performed by: INTERNAL MEDICINE

## 2021-10-01 PROCEDURE — 82570 ASSAY OF URINE CREATININE: CPT | Performed by: INTERNAL MEDICINE

## 2021-10-01 PROCEDURE — 80061 LIPID PANEL: CPT | Performed by: INTERNAL MEDICINE

## 2021-10-02 LAB
SARS-COV-2 IGG SERPL IA-ACNC: 4034.4 AU/ML
SARS-COV-2 IGG SERPL QL IA: POSITIVE

## 2021-10-22 ENCOUNTER — PATIENT MESSAGE (OUTPATIENT)
Dept: FAMILY MEDICINE | Facility: CLINIC | Age: 61
End: 2021-10-22

## 2021-10-22 ENCOUNTER — OFFICE VISIT (OUTPATIENT)
Dept: FAMILY MEDICINE | Facility: CLINIC | Age: 61
End: 2021-10-22
Payer: COMMERCIAL

## 2021-10-22 VITALS
DIASTOLIC BLOOD PRESSURE: 82 MMHG | WEIGHT: 229 LBS | HEART RATE: 71 BPM | SYSTOLIC BLOOD PRESSURE: 116 MMHG | BODY MASS INDEX: 31.02 KG/M2 | HEIGHT: 72 IN

## 2021-10-22 DIAGNOSIS — E78.2 MIXED DYSLIPIDEMIA: ICD-10-CM

## 2021-10-22 DIAGNOSIS — Z79.01 ANTICOAGULATED: ICD-10-CM

## 2021-10-22 DIAGNOSIS — I48.0 PAROXYSMAL ATRIAL FIBRILLATION: ICD-10-CM

## 2021-10-22 DIAGNOSIS — I10 BENIGN ESSENTIAL HYPERTENSION: ICD-10-CM

## 2021-10-22 DIAGNOSIS — Z00.00 ANNUAL PHYSICAL EXAM: Primary | ICD-10-CM

## 2021-10-22 PROCEDURE — 3066F PR DOCUMENTATION OF TREATMENT FOR NEPHROPATHY: ICD-10-PCS | Mod: S$GLB,,, | Performed by: INTERNAL MEDICINE

## 2021-10-22 PROCEDURE — 3061F NEG MICROALBUMINURIA REV: CPT | Mod: S$GLB,,, | Performed by: INTERNAL MEDICINE

## 2021-10-22 PROCEDURE — 99213 OFFICE O/P EST LOW 20 MIN: CPT | Mod: 25,S$GLB,, | Performed by: INTERNAL MEDICINE

## 2021-10-22 PROCEDURE — 3079F DIAST BP 80-89 MM HG: CPT | Mod: S$GLB,,, | Performed by: INTERNAL MEDICINE

## 2021-10-22 PROCEDURE — 3074F PR MOST RECENT SYSTOLIC BLOOD PRESSURE < 130 MM HG: ICD-10-PCS | Mod: S$GLB,,, | Performed by: INTERNAL MEDICINE

## 2021-10-22 PROCEDURE — 3061F PR NEG MICROALBUMINURIA RESULT DOCUMENTED/REVIEW: ICD-10-PCS | Mod: S$GLB,,, | Performed by: INTERNAL MEDICINE

## 2021-10-22 PROCEDURE — 99396 PREV VISIT EST AGE 40-64: CPT | Mod: S$GLB,,, | Performed by: INTERNAL MEDICINE

## 2021-10-22 PROCEDURE — 3008F PR BODY MASS INDEX (BMI) DOCUMENTED: ICD-10-PCS | Mod: S$GLB,,, | Performed by: INTERNAL MEDICINE

## 2021-10-22 PROCEDURE — 3074F SYST BP LT 130 MM HG: CPT | Mod: S$GLB,,, | Performed by: INTERNAL MEDICINE

## 2021-10-22 PROCEDURE — 3079F PR MOST RECENT DIASTOLIC BLOOD PRESSURE 80-89 MM HG: ICD-10-PCS | Mod: S$GLB,,, | Performed by: INTERNAL MEDICINE

## 2021-10-22 PROCEDURE — 1160F PR REVIEW ALL MEDS BY PRESCRIBER/CLIN PHARMACIST DOCUMENTED: ICD-10-PCS | Mod: S$GLB,,, | Performed by: INTERNAL MEDICINE

## 2021-10-22 PROCEDURE — 3066F NEPHROPATHY DOC TX: CPT | Mod: S$GLB,,, | Performed by: INTERNAL MEDICINE

## 2021-10-22 PROCEDURE — 1160F RVW MEDS BY RX/DR IN RCRD: CPT | Mod: S$GLB,,, | Performed by: INTERNAL MEDICINE

## 2021-10-22 PROCEDURE — 99396 PR PREVENTIVE VISIT,EST,40-64: ICD-10-PCS | Mod: S$GLB,,, | Performed by: INTERNAL MEDICINE

## 2021-10-22 PROCEDURE — 3008F BODY MASS INDEX DOCD: CPT | Mod: S$GLB,,, | Performed by: INTERNAL MEDICINE

## 2021-10-22 PROCEDURE — 99213 PR OFFICE/OUTPT VISIT, EST, LEVL III, 20-29 MIN: ICD-10-PCS | Mod: 25,S$GLB,, | Performed by: INTERNAL MEDICINE

## 2021-11-04 DIAGNOSIS — R53.81 GENERAL DETERIORATION OF HEALTH: ICD-10-CM

## 2021-12-06 DIAGNOSIS — R21 RASH: ICD-10-CM

## 2021-12-07 RX ORDER — CLOTRIMAZOLE AND BETAMETHASONE DIPROPIONATE 10; .64 MG/G; MG/G
CREAM TOPICAL 2 TIMES DAILY
Qty: 45 G | Refills: 0 | Status: SHIPPED | OUTPATIENT
Start: 2021-12-07 | End: 2021-12-09 | Stop reason: SDUPTHER

## 2021-12-09 RX ORDER — CLOTRIMAZOLE AND BETAMETHASONE DIPROPIONATE 10; .64 MG/G; MG/G
CREAM TOPICAL 2 TIMES DAILY
Qty: 45 G | Refills: 0 | Status: SHIPPED | OUTPATIENT
Start: 2021-12-09 | End: 2022-04-05

## 2022-01-31 DIAGNOSIS — I10 BENIGN ESSENTIAL HYPERTENSION: ICD-10-CM

## 2022-01-31 DIAGNOSIS — E78.49 FAMILIAL COMBINED HYPERLIPIDEMIA: ICD-10-CM

## 2022-01-31 RX ORDER — AMLODIPINE BESYLATE AND ATORVASTATIN CALCIUM 5; 20 MG/1; MG/1
1 TABLET, FILM COATED ORAL DAILY
Qty: 90 TABLET | Refills: 1 | Status: SHIPPED | OUTPATIENT
Start: 2022-01-31 | End: 2022-07-26 | Stop reason: SDUPTHER

## 2022-01-31 RX ORDER — TRIAMTERENE/HYDROCHLOROTHIAZID 37.5-25 MG
1 TABLET ORAL DAILY
Qty: 90 TABLET | Refills: 3 | Status: SHIPPED | OUTPATIENT
Start: 2022-01-31 | End: 2023-01-25

## 2022-02-08 ENCOUNTER — PATIENT MESSAGE (OUTPATIENT)
Dept: FAMILY MEDICINE | Facility: CLINIC | Age: 62
End: 2022-02-08
Payer: COMMERCIAL

## 2022-02-08 DIAGNOSIS — J30.1 NON-SEASONAL ALLERGIC RHINITIS DUE TO POLLEN: Primary | ICD-10-CM

## 2022-02-08 RX ORDER — FLUTICASONE PROPIONATE 50 MCG
1 SPRAY, SUSPENSION (ML) NASAL DAILY
Qty: 16 G | Refills: 5 | Status: SHIPPED | OUTPATIENT
Start: 2022-02-08 | End: 2023-10-29 | Stop reason: SDUPTHER

## 2022-02-09 ENCOUNTER — PATIENT MESSAGE (OUTPATIENT)
Dept: FAMILY MEDICINE | Facility: CLINIC | Age: 62
End: 2022-02-09
Payer: COMMERCIAL

## 2022-02-09 NOTE — TELEPHONE ENCOUNTER
Non-seasonal allergic rhinitis due to pollen  -     fluticasone propionate (FLONASE) 50 mcg/actuation nasal spray; 1 spray (50 mcg total) by Each Nostril route once daily.  Dispense: 16 g; Refill: 5

## 2022-03-08 RX ORDER — METOPROLOL TARTRATE 100 MG/1
100 TABLET ORAL 2 TIMES DAILY
Qty: 180 TABLET | Refills: 2 | Status: SHIPPED | OUTPATIENT
Start: 2022-03-08 | End: 2022-11-30

## 2022-03-10 DIAGNOSIS — I10 BENIGN ESSENTIAL HYPERTENSION: ICD-10-CM

## 2022-03-10 RX ORDER — POTASSIUM CHLORIDE 750 MG/1
10 CAPSULE, EXTENDED RELEASE ORAL 2 TIMES DAILY
Qty: 180 CAPSULE | Refills: 3 | Status: SHIPPED | OUTPATIENT
Start: 2022-03-10 | End: 2023-05-15 | Stop reason: ALTCHOICE

## 2022-04-05 ENCOUNTER — PATIENT MESSAGE (OUTPATIENT)
Dept: FAMILY MEDICINE | Facility: CLINIC | Age: 62
End: 2022-04-05

## 2022-04-11 NOTE — PROGRESS NOTES
----- Message from Beny Winn MA sent at 4/11/2022  2:59 PM CDT -----    ----- Message -----  From: Rubén Rico MD  Sent: 4/11/2022   2:55 PM CDT  To: [facility] Staff    Please schedule for caudal WERO.           [facility] Medicine  Progress Note    Patient Name: Howard Segura  MRN: [id number]  Patient Class: IP- Inpatient   Admission Date: 12/22/2019  Length of Stay: 1 days  Attending Physician: Brooklyn Holguin MD  Primary Care Provider: Bassam Lopez MD        Subjective:     Principal Problem:Closed fracture of neck of right femur        HPI:  Mr. Segura is a 58 y/o male, PMH of GERD, HTN, HLD, prior renal stone, who slipped and fell from the top of a flight of steps from a standing position striking his back several times and landing on his buttocks and right leg. There was deformity of the leg after the fall and severe pain. EMS transported the patient to [place] ER, where imaging showed a closed right hip surgical neck fracture. EKG showed A. Fib. without RVR, which is a new finding in this patient. Has been on metoprolol x age 30 2/2 PACs. Not anticoagulated. Upon arrival to [facility] the patient reports right hip pain and spasms of the right leg. He describes the hip pain as constant and dull and rates it 3/10. The patient states rest improves the pain and movement exacerbates it. He reports the leg spasms have started since his leg was placed in a traction splint. He states they are intermittent but progressively becoming more painful. He rates the spasms 8/10. He reports no aggravating or alleviating factors. He denies hitting his head or any loss of consciousness. He denies any SOB, chest pain, numbness, tingling, headache, fever, chills, dizziness, or headache. His workup at [place] ED revealed mild anemia, otherwise no lab abnormalities. Case d/w Ortho, who will consult. Patient transferred to [facility] and will be admitted under hospital medicine.    Overview/Hospital Course:  No notes on file    No new subjective & objective note has been filed under this hospital service since the last note was generated.      Assessment/Plan:      * Closed fracture of neck of  right femur  Status post right hip open reduction internal fixation - POD # 1.  Continue to follow Orthopedic recommendations.  Needs aggressive incentive spirometry.  Follow hemoglobin and hematocrit closely.  Pain control with PO narcotics and antiemetics as needed.  Physical therapy as per Orthopedics protocol with fall precautions.        Hypophosphatemia  Replete phosphorus.  Follow daily phosphorus level.      Fracture of femoral neck, right  As above      Paroxysmal atrial fibrillation  Reviewed EKG from [place] showing atrial fibrillation w/o RVR, rate in the 80s.  Follow ECHO and cardiology recommendations.  [id number] Score: 1  Start Xarelto 10 mg daily as per recommendations by Dr. Segal once approved by Dr. Anderws.  I had a long discussion with patient and his wife, we discussed risks and benefits related to use of long-term anticoagulation including high risk for bleeding tendency.  We reviewed fall precautions.  Patient is in agreement with use of Xarelto.      GERD without esophagitis  Chronic. Continue PPI.    Mixed dyslipidemia  Chronic. Continue home statin.   Reviewed previous lipid panel:   Ref. Range 10/30/2019 07:02   Cholesterol Latest Ref Range: 120 - 199 mg/dL 134   HDL Latest Ref Range: 40 - 75 mg/dL 32 (L)   Hdl/Cholesterol Ratio Latest Ref Range: 20.0 - 50.0 % 23.9   LDL Cholesterol External Latest Ref Range: 63.0 - 159.0 mg/dL 57.0 (L)   Non-HDL Cholesterol Latest Units: mg/dL 102   Total Cholesterol/HDL Ratio Latest Ref Range: 2.0 - 5.0  4.2   Triglycerides Latest Ref Range: 30 - 150 mg/dL 225 (H)       Former smoker  Encouraged continued cessation.      Obesity with body mass index 30 or greater  Body mass index is 35.87 kg/m². Morbid obesity complicates all aspects of disease management from diagnostic modalities to treatment. Weight loss encouraged and health benefits explained to patient.            Benign essential hypertension  Chronic, controlled.  Latest blood pressure and vitals  reviewed-   Temp:  [97.6 °F (36.4 °C)-100.1 °F (37.8 °C)]   Pulse:  []   Resp:  [11-22]   BP: (102-170)/()   SpO2:  [90 %-99 %] .   Home meds for hypertension were reviewed and noted below. Will hold anti-hypertensives for now and monitor bp closely.  Hypertension Medications             amlodipine-atorvastatin (CADUET) 5-20 mg per tablet Take 1 tablet by mouth once daily.    metoprolol tartrate (LOPRESSOR) 100 MG tablet Take 1 tablet (100 mg total) by mouth 2 (two) times daily.    triamterene-hydrochlorothiazide 37.5-25 mg (MAXZIDE-25) 37.5-25 mg per tablet Take 1 tablet by mouth once daily.        Will utilize p.r.n. blood pressure medication only if patient's blood pressure greater than  180/110 and he develops symptoms such as worsening chest pain or shortness of breath.      Time spent in care of the patient, counseling and coordination of care (Greater than 50% spent in direct face to face contact): 35 min.      VTE Risk Mitigation (From admission, onward)         Ordered     rivaroxaban tablet 10 mg  With dinner      12/23/19 1033     IP VTE HIGH RISK PATIENT  Once      12/22/19 1943     Place sequential compression device  Until discontinued      12/22/19 1943     Place sequential compression device  Until discontinued      12/22/19 0311 12/22/19 0311                      Brooklyn Holguin MD  Department of Hospital Medicine   [facility]

## 2022-04-13 ENCOUNTER — OFFICE VISIT (OUTPATIENT)
Dept: FAMILY MEDICINE | Facility: CLINIC | Age: 62
End: 2022-04-13
Payer: COMMERCIAL

## 2022-04-13 VITALS
WEIGHT: 233 LBS | HEIGHT: 72 IN | DIASTOLIC BLOOD PRESSURE: 89 MMHG | BODY MASS INDEX: 31.56 KG/M2 | HEART RATE: 76 BPM | SYSTOLIC BLOOD PRESSURE: 132 MMHG

## 2022-04-13 DIAGNOSIS — Z11.4 SCREENING FOR HUMAN IMMUNODEFICIENCY VIRUS: ICD-10-CM

## 2022-04-13 DIAGNOSIS — R73.9 ELEVATED BLOOD SUGAR: ICD-10-CM

## 2022-04-13 DIAGNOSIS — Z79.01 ANTICOAGULATED: ICD-10-CM

## 2022-04-13 DIAGNOSIS — E78.49 FAMILIAL COMBINED HYPERLIPIDEMIA: ICD-10-CM

## 2022-04-13 DIAGNOSIS — I48.19 PERSISTENT ATRIAL FIBRILLATION: Primary | ICD-10-CM

## 2022-04-13 DIAGNOSIS — E78.2 MIXED DYSLIPIDEMIA: ICD-10-CM

## 2022-04-13 PROCEDURE — 1160F PR REVIEW ALL MEDS BY PRESCRIBER/CLIN PHARMACIST DOCUMENTED: ICD-10-PCS | Mod: S$GLB,,, | Performed by: INTERNAL MEDICINE

## 2022-04-13 PROCEDURE — 99214 PR OFFICE/OUTPT VISIT, EST, LEVL IV, 30-39 MIN: ICD-10-PCS | Mod: S$GLB,,, | Performed by: INTERNAL MEDICINE

## 2022-04-13 PROCEDURE — 99214 OFFICE O/P EST MOD 30 MIN: CPT | Mod: S$GLB,,, | Performed by: INTERNAL MEDICINE

## 2022-04-13 PROCEDURE — 3079F PR MOST RECENT DIASTOLIC BLOOD PRESSURE 80-89 MM HG: ICD-10-PCS | Mod: S$GLB,,, | Performed by: INTERNAL MEDICINE

## 2022-04-13 PROCEDURE — 3008F BODY MASS INDEX DOCD: CPT | Mod: S$GLB,,, | Performed by: INTERNAL MEDICINE

## 2022-04-13 PROCEDURE — 3075F SYST BP GE 130 - 139MM HG: CPT | Mod: S$GLB,,, | Performed by: INTERNAL MEDICINE

## 2022-04-13 PROCEDURE — 1160F RVW MEDS BY RX/DR IN RCRD: CPT | Mod: S$GLB,,, | Performed by: INTERNAL MEDICINE

## 2022-04-13 PROCEDURE — 3079F DIAST BP 80-89 MM HG: CPT | Mod: S$GLB,,, | Performed by: INTERNAL MEDICINE

## 2022-04-13 PROCEDURE — 3008F PR BODY MASS INDEX (BMI) DOCUMENTED: ICD-10-PCS | Mod: S$GLB,,, | Performed by: INTERNAL MEDICINE

## 2022-04-13 PROCEDURE — 3075F PR MOST RECENT SYSTOLIC BLOOD PRESS GE 130-139MM HG: ICD-10-PCS | Mod: S$GLB,,, | Performed by: INTERNAL MEDICINE
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
## 2022-04-20 ENCOUNTER — PATIENT MESSAGE (OUTPATIENT)
Dept: FAMILY MEDICINE | Facility: CLINIC | Age: 62
End: 2022-04-20

## 2022-04-20 DIAGNOSIS — J01.00 ACUTE MAXILLARY SINUSITIS, RECURRENCE NOT SPECIFIED: Primary | ICD-10-CM

## 2022-04-20 RX ORDER — AZITHROMYCIN 250 MG/1
TABLET, FILM COATED ORAL
Qty: 6 TABLET | Refills: 0 | Status: SHIPPED | OUTPATIENT
Start: 2022-04-20 | End: 2022-04-25

## 2022-04-20 RX ORDER — PREDNISONE 20 MG/1
20 TABLET ORAL 2 TIMES DAILY
Qty: 8 TABLET | Refills: 0 | Status: SHIPPED | OUTPATIENT
Start: 2022-04-20 | End: 2022-10-06

## 2022-04-20 NOTE — TELEPHONE ENCOUNTER
Acute maxillary sinusitis, recurrence not specified  -     predniSONE (DELTASONE) 20 MG tablet; Take 1 tablet (20 mg total) by mouth 2 (two) times daily.  Dispense: 8 tablet; Refill: 0  -     azithromycin (Z-MANUEL) 250 MG tablet; Take 2 tablets by mouth on day 1; Take 1 tablet by mouth on days 2-5  Dispense: 6 tablet; Refill: 0

## 2022-04-22 ENCOUNTER — PATIENT MESSAGE (OUTPATIENT)
Dept: FAMILY MEDICINE | Facility: CLINIC | Age: 62
End: 2022-04-22

## 2022-04-25 ENCOUNTER — PATIENT MESSAGE (OUTPATIENT)
Dept: FAMILY MEDICINE | Facility: CLINIC | Age: 62
End: 2022-04-25

## 2022-04-25 DIAGNOSIS — R05.9 COUGH: ICD-10-CM

## 2022-04-25 DIAGNOSIS — U07.1 COVID-19 VIRUS INFECTION: Primary | ICD-10-CM

## 2022-04-25 RX ORDER — PROMETHAZINE HYDROCHLORIDE AND CODEINE PHOSPHATE 6.25; 1 MG/5ML; MG/5ML
5 SOLUTION ORAL EVERY 8 HOURS PRN
Qty: 90 ML | Refills: 0 | Status: SHIPPED | OUTPATIENT
Start: 2022-04-25 | End: 2022-05-05

## 2022-04-26 ENCOUNTER — PATIENT MESSAGE (OUTPATIENT)
Dept: FAMILY MEDICINE | Facility: CLINIC | Age: 62
End: 2022-04-26

## 2022-04-26 NOTE — TELEPHONE ENCOUNTER
COVID-19 virus infection  -     promethazine-codeine 6.25-10 mg/5 ml (PHENERGAN WITH CODEINE) 6.25-10 mg/5 mL syrup; Take 5 mLs by mouth every 8 (eight) hours as needed for Cough. For cough related to COVID.  Do not drive on this medication because of drowsiness.  Watch out for constipation.  Take stool softener.  Dispense: 90 mL; Refill: 0    Cough  -     promethazine-codeine 6.25-10 mg/5 ml (PHENERGAN WITH CODEINE) 6.25-10 mg/5 mL syrup; Take 5 mLs by mouth every 8 (eight) hours as needed for Cough. For cough related to COVID.  Do not drive on this medication because of drowsiness.  Watch out for constipation.  Take stool softener.  Dispense: 90 mL; Refill: 0    Patient left a message that he was tested for COVID and has alertness cough.

## 2022-05-04 ENCOUNTER — LAB VISIT (OUTPATIENT)
Dept: LAB | Facility: HOSPITAL | Age: 62
End: 2022-05-04
Attending: INTERNAL MEDICINE
Payer: COMMERCIAL

## 2022-05-04 ENCOUNTER — PATIENT MESSAGE (OUTPATIENT)
Dept: FAMILY MEDICINE | Facility: CLINIC | Age: 62
End: 2022-05-04

## 2022-05-04 DIAGNOSIS — E78.2 MIXED DYSLIPIDEMIA: ICD-10-CM

## 2022-05-04 DIAGNOSIS — I48.19 PERSISTENT ATRIAL FIBRILLATION: ICD-10-CM

## 2022-05-04 DIAGNOSIS — R73.9 ELEVATED BLOOD SUGAR: ICD-10-CM

## 2022-05-04 DIAGNOSIS — Z11.4 SCREENING FOR HUMAN IMMUNODEFICIENCY VIRUS: ICD-10-CM

## 2022-05-04 LAB
ALT SERPL W/O P-5'-P-CCNC: 35 U/L (ref 10–44)
ANION GAP SERPL CALC-SCNC: 9 MMOL/L (ref 8–16)
BASOPHILS # BLD AUTO: 0.06 K/UL (ref 0–0.2)
BASOPHILS NFR BLD: 0.9 % (ref 0–1.9)
BUN SERPL-MCNC: 17 MG/DL (ref 8–23)
CALCIUM SERPL-MCNC: 8.9 MG/DL (ref 8.7–10.5)
CHLORIDE SERPL-SCNC: 103 MMOL/L (ref 95–110)
CHOLEST SERPL-MCNC: 166 MG/DL (ref 120–199)
CHOLEST/HDLC SERPL: 6.6 {RATIO} (ref 2–5)
CO2 SERPL-SCNC: 29 MMOL/L (ref 23–29)
CREAT SERPL-MCNC: 1 MG/DL (ref 0.5–1.4)
DIFFERENTIAL METHOD: ABNORMAL
EOSINOPHIL # BLD AUTO: 0.3 K/UL (ref 0–0.5)
EOSINOPHIL NFR BLD: 4 % (ref 0–8)
ERYTHROCYTE [DISTWIDTH] IN BLOOD BY AUTOMATED COUNT: 13 % (ref 11.5–14.5)
EST. GFR  (AFRICAN AMERICAN): >60 ML/MIN/1.73 M^2
EST. GFR  (NON AFRICAN AMERICAN): >60 ML/MIN/1.73 M^2
ESTIMATED AVG GLUCOSE: 126 MG/DL (ref 68–131)
GLUCOSE SERPL-MCNC: 134 MG/DL (ref 70–110)
HBA1C MFR BLD: 6 % (ref 4.5–6.2)
HCT VFR BLD AUTO: 40.2 % (ref 40–54)
HDLC SERPL-MCNC: 25 MG/DL (ref 40–75)
HDLC SERPL: 15.1 % (ref 20–50)
HGB BLD-MCNC: 14.6 G/DL (ref 14–18)
IMM GRANULOCYTES # BLD AUTO: 0.05 K/UL (ref 0–0.04)
IMM GRANULOCYTES NFR BLD AUTO: 0.8 % (ref 0–0.5)
LDLC SERPL CALC-MCNC: ABNORMAL MG/DL (ref 63–159)
LYMPHOCYTES # BLD AUTO: 2 K/UL (ref 1–4.8)
LYMPHOCYTES NFR BLD: 29.7 % (ref 18–48)
MAGNESIUM SERPL-MCNC: 1.6 MG/DL (ref 1.6–2.6)
MCH RBC QN AUTO: 31.1 PG (ref 27–31)
MCHC RBC AUTO-ENTMCNC: 36.3 G/DL (ref 32–36)
MCV RBC AUTO: 86 FL (ref 82–98)
MONOCYTES # BLD AUTO: 0.6 K/UL (ref 0.3–1)
MONOCYTES NFR BLD: 9.6 % (ref 4–15)
NEUTROPHILS # BLD AUTO: 3.6 K/UL (ref 1.8–7.7)
NEUTROPHILS NFR BLD: 55 % (ref 38–73)
NONHDLC SERPL-MCNC: 141 MG/DL
NRBC BLD-RTO: 0 /100 WBC
PLATELET # BLD AUTO: 169 K/UL (ref 150–450)
PMV BLD AUTO: 9.4 FL (ref 9.2–12.9)
POTASSIUM SERPL-SCNC: 3.6 MMOL/L (ref 3.5–5.1)
RBC # BLD AUTO: 4.7 M/UL (ref 4.6–6.2)
SODIUM SERPL-SCNC: 141 MMOL/L (ref 136–145)
TRIGL SERPL-MCNC: 559 MG/DL (ref 30–150)
WBC # BLD AUTO: 6.56 K/UL (ref 3.9–12.7)

## 2022-05-04 PROCEDURE — 87389 HIV-1 AG W/HIV-1&-2 AB AG IA: CPT | Performed by: INTERNAL MEDICINE

## 2022-05-04 PROCEDURE — 80048 BASIC METABOLIC PNL TOTAL CA: CPT | Performed by: INTERNAL MEDICINE

## 2022-05-04 PROCEDURE — 36415 COLL VENOUS BLD VENIPUNCTURE: CPT | Performed by: INTERNAL MEDICINE

## 2022-05-04 PROCEDURE — 84460 ALANINE AMINO (ALT) (SGPT): CPT | Performed by: INTERNAL MEDICINE

## 2022-05-04 PROCEDURE — 80061 LIPID PANEL: CPT | Performed by: INTERNAL MEDICINE

## 2022-05-04 PROCEDURE — 83735 ASSAY OF MAGNESIUM: CPT | Performed by: INTERNAL MEDICINE

## 2022-05-04 PROCEDURE — 83036 HEMOGLOBIN GLYCOSYLATED A1C: CPT | Performed by: INTERNAL MEDICINE

## 2022-05-04 PROCEDURE — 85025 COMPLETE CBC W/AUTO DIFF WBC: CPT | Performed by: INTERNAL MEDICINE

## 2022-05-05 LAB — HIV 1+2 AB+HIV1 P24 AG SERPL QL IA: NON REACTIVE

## 2022-05-26 ENCOUNTER — LAB VISIT (OUTPATIENT)
Dept: LAB | Facility: HOSPITAL | Age: 62
End: 2022-05-26
Attending: INTERNAL MEDICINE
Payer: COMMERCIAL

## 2022-05-26 ENCOUNTER — PATIENT MESSAGE (OUTPATIENT)
Dept: FAMILY MEDICINE | Facility: CLINIC | Age: 62
End: 2022-05-26

## 2022-05-26 DIAGNOSIS — E78.5 HYPERLIPEMIA: Primary | ICD-10-CM

## 2022-05-26 LAB
CHOLEST SERPL-MCNC: 122 MG/DL (ref 120–199)
CHOLEST/HDLC SERPL: 4.5 {RATIO} (ref 2–5)
HDLC SERPL-MCNC: 27 MG/DL (ref 40–75)
HDLC SERPL: 22.1 % (ref 20–50)
LDLC SERPL CALC-MCNC: 55.8 MG/DL (ref 63–159)
NONHDLC SERPL-MCNC: 95 MG/DL
TRIGL SERPL-MCNC: 196 MG/DL (ref 30–150)

## 2022-05-26 PROCEDURE — 36415 COLL VENOUS BLD VENIPUNCTURE: CPT | Performed by: INTERNAL MEDICINE

## 2022-05-26 PROCEDURE — 80061 LIPID PANEL: CPT | Performed by: INTERNAL MEDICINE

## 2022-06-16 NOTE — PLAN OF CARE
06/16/22                            Adi Mohamud  [address]  [facility] [address]    To Whom It May Concern:    This is to certify Adi Mohamud was evaluated with Luisa Jasmine DO on 06/16/22 in urgent care today.  She will need to be excused from work today.              Luisa Jasmine DO  [facility]  [address]  [address]  [place] [address]  Phone: [phone]  Fax: [phone]        12/23/19 0938   Patient Assessment/Suction   Level of Consciousness (AVPU) alert   Respiratory Effort Normal;Unlabored   Incentive Spirometer   $ Incentive Spirometer Charges postop instruction;proper technique demonstrated   Incentive Spirometer Predicted Level (mL) 2700   Administration (IS) mouthpiece;proper technique demonstrated   Number of Repetitions (IS) 10   Level Incentive Spirometer (mL) 1000   Patient Tolerance (IS) good   ISU and instruct with IS Q4 w/a

## 2022-07-26 DIAGNOSIS — I10 BENIGN ESSENTIAL HYPERTENSION: ICD-10-CM

## 2022-07-26 DIAGNOSIS — E78.49 FAMILIAL COMBINED HYPERLIPIDEMIA: ICD-10-CM

## 2022-07-27 RX ORDER — AMLODIPINE BESYLATE AND ATORVASTATIN CALCIUM 5; 20 MG/1; MG/1
1 TABLET, FILM COATED ORAL DAILY
Qty: 90 TABLET | Refills: 1 | Status: SHIPPED | OUTPATIENT
Start: 2022-07-27 | End: 2023-01-25

## 2022-10-06 ENCOUNTER — OFFICE VISIT (OUTPATIENT)
Dept: URGENT CARE | Facility: CLINIC | Age: 62
End: 2022-10-06
Payer: COMMERCIAL

## 2022-10-06 ENCOUNTER — PATIENT MESSAGE (OUTPATIENT)
Dept: FAMILY MEDICINE | Facility: CLINIC | Age: 62
End: 2022-10-06

## 2022-10-06 VITALS
BODY MASS INDEX: 29.66 KG/M2 | WEIGHT: 219 LBS | HEART RATE: 78 BPM | HEIGHT: 72 IN | SYSTOLIC BLOOD PRESSURE: 127 MMHG | OXYGEN SATURATION: 98 % | RESPIRATION RATE: 16 BRPM | DIASTOLIC BLOOD PRESSURE: 83 MMHG | TEMPERATURE: 97 F

## 2022-10-06 DIAGNOSIS — B96.89 ACUTE BACTERIAL SINUSITIS: Primary | ICD-10-CM

## 2022-10-06 DIAGNOSIS — J01.90 ACUTE BACTERIAL SINUSITIS: Primary | ICD-10-CM

## 2022-10-06 PROBLEM — Z87.442 HISTORY OF RENAL CALCULI: Status: ACTIVE | Noted: 2022-10-06

## 2022-10-06 PROCEDURE — 96372 THER/PROPH/DIAG INJ SC/IM: CPT | Mod: S$GLB,,, | Performed by: NURSE PRACTITIONER

## 2022-10-06 PROCEDURE — 3008F PR BODY MASS INDEX (BMI) DOCUMENTED: ICD-10-PCS | Mod: CPTII,S$GLB,, | Performed by: NURSE PRACTITIONER

## 2022-10-06 PROCEDURE — 3008F BODY MASS INDEX DOCD: CPT | Mod: CPTII,S$GLB,, | Performed by: NURSE PRACTITIONER

## 2022-10-06 PROCEDURE — 1160F PR REVIEW ALL MEDS BY PRESCRIBER/CLIN PHARMACIST DOCUMENTED: ICD-10-PCS | Mod: CPTII,S$GLB,, | Performed by: NURSE PRACTITIONER

## 2022-10-06 PROCEDURE — 3044F PR MOST RECENT HEMOGLOBIN A1C LEVEL <7.0%: ICD-10-PCS | Mod: CPTII,S$GLB,, | Performed by: NURSE PRACTITIONER

## 2022-10-06 PROCEDURE — 99213 OFFICE O/P EST LOW 20 MIN: CPT | Mod: 25,S$GLB,, | Performed by: NURSE PRACTITIONER

## 2022-10-06 PROCEDURE — 96372 PR INJECTION,THERAP/PROPH/DIAG2ST, IM OR SUBCUT: ICD-10-PCS | Mod: S$GLB,,, | Performed by: NURSE PRACTITIONER

## 2022-10-06 PROCEDURE — 3074F SYST BP LT 130 MM HG: CPT | Mod: CPTII,S$GLB,, | Performed by: NURSE PRACTITIONER

## 2022-10-06 PROCEDURE — 3079F PR MOST RECENT DIASTOLIC BLOOD PRESSURE 80-89 MM HG: ICD-10-PCS | Mod: CPTII,S$GLB,, | Performed by: NURSE PRACTITIONER

## 2022-10-06 PROCEDURE — 3074F PR MOST RECENT SYSTOLIC BLOOD PRESSURE < 130 MM HG: ICD-10-PCS | Mod: CPTII,S$GLB,, | Performed by: NURSE PRACTITIONER

## 2022-10-06 PROCEDURE — 99213 PR OFFICE/OUTPT VISIT, EST, LEVL III, 20-29 MIN: ICD-10-PCS | Mod: 25,S$GLB,, | Performed by: NURSE PRACTITIONER

## 2022-10-06 PROCEDURE — 3079F DIAST BP 80-89 MM HG: CPT | Mod: CPTII,S$GLB,, | Performed by: NURSE PRACTITIONER

## 2022-10-06 PROCEDURE — 1159F MED LIST DOCD IN RCRD: CPT | Mod: CPTII,S$GLB,, | Performed by: NURSE PRACTITIONER

## 2022-10-06 PROCEDURE — 3044F HG A1C LEVEL LT 7.0%: CPT | Mod: CPTII,S$GLB,, | Performed by: NURSE PRACTITIONER

## 2022-10-06 PROCEDURE — 1159F PR MEDICATION LIST DOCUMENTED IN MEDICAL RECORD: ICD-10-PCS | Mod: CPTII,S$GLB,, | Performed by: NURSE PRACTITIONER

## 2022-10-06 PROCEDURE — 1160F RVW MEDS BY RX/DR IN RCRD: CPT | Mod: CPTII,S$GLB,, | Performed by: NURSE PRACTITIONER

## 2022-10-06 RX ORDER — AMOXICILLIN AND CLAVULANATE POTASSIUM 875; 125 MG/1; MG/1
1 TABLET, FILM COATED ORAL EVERY 12 HOURS
Qty: 14 TABLET | Refills: 0 | Status: SHIPPED | OUTPATIENT
Start: 2022-10-06 | End: 2022-10-13

## 2022-10-06 RX ORDER — DEXAMETHASONE SODIUM PHOSPHATE 4 MG/ML
8 INJECTION, SOLUTION INTRA-ARTICULAR; INTRALESIONAL; INTRAMUSCULAR; INTRAVENOUS; SOFT TISSUE
Status: COMPLETED | OUTPATIENT
Start: 2022-10-06 | End: 2022-10-06

## 2022-10-06 RX ORDER — PREDNISONE 20 MG/1
20 TABLET ORAL 2 TIMES DAILY
Qty: 8 TABLET | Refills: 0 | Status: SHIPPED | OUTPATIENT
Start: 2022-10-07 | End: 2022-10-11

## 2022-10-06 RX ADMIN — DEXAMETHASONE SODIUM PHOSPHATE 8 MG: 4 INJECTION, SOLUTION INTRA-ARTICULAR; INTRALESIONAL; INTRAMUSCULAR; INTRAVENOUS; SOFT TISSUE at 01:10

## 2022-10-06 NOTE — PATIENT INSTRUCTIONS
Start prednisone pills tomorrow and take twice a day for 3-4 days.  Always take with food to minimize stomach upset.    Augmentin twice a day for 7 days.  It is always advisable to take probiotics for intestinal health that you can purchase over-the-counter when taking antibiotics to help maintain the normal good bacteria in the intestines.    Continue Flonase daily as already prescribed.    You may also consider using plain Mucinex or the generic guaifenesin 1200 mg twice a day until symptoms have resolved to help thin secretions and break down mucus if needed  Increase your fluid intake significantly to help thin secretions/mucus

## 2022-10-06 NOTE — PROGRESS NOTES
Subjective:       Patient ID: Howard Segura is a 61 y.o. male.    Vitals:  height is 6' (1.829 m) and weight is 99.3 kg (219 lb). His oral temperature is 97.1 °F (36.2 °C). His blood pressure is 127/83 and his pulse is 78. His respiration is 16 and oxygen saturation is 98%.     Chief Complaint: Sinus Problem    Sinus Problem  This is a new problem. Episode onset: a week and a half/two weeks ago. The problem has been gradually worsening since onset. There has been no fever. Associated symptoms include congestion (Thick), headaches and sinus pressure. Pertinent negatives include no coughing, shortness of breath or sore throat. (Plugged ear sensation  Left side facial pain) Treatments tried: Old Rx Zpack. The treatment provided no relief.     Constitution: Negative for fever.   HENT:  Positive for congestion (Thick), sinus pain and sinus pressure. Negative for sore throat.    Respiratory:  Negative for chest tightness, cough and shortness of breath.    Gastrointestinal:  Negative for abdominal pain, nausea, vomiting and diarrhea.   Neurological:  Positive for headaches.     Objective:      Physical Exam   Constitutional: He is oriented to person, place, and time. He appears well-developed.  Non-toxic appearance. He does not appear ill. No distress.   HENT:   Head: Normocephalic and atraumatic.   Ears:   Right Ear: Tympanic membrane, external ear and ear canal normal.   Left Ear: Tympanic membrane, external ear and ear canal normal.   Nose: Congestion present. Left sinus exhibits maxillary sinus tenderness.   Mouth/Throat: Uvula is midline and oropharynx is clear and moist. Mucous membranes are moist. No oral lesions. No trismus in the jaw. No uvula swelling. No oropharyngeal exudate or posterior oropharyngeal erythema.   Eyes: Conjunctivae and EOM are normal. Right eye exhibits no discharge. Left eye exhibits no discharge.   Neck: Neck supple. No neck rigidity present.   Cardiovascular: Normal rate, regular rhythm and  normal heart sounds.   Pulmonary/Chest: Effort normal and breath sounds normal. No respiratory distress. He has no wheezes. He has no rhonchi. He has no rales.   Abdominal: Normal appearance.   Musculoskeletal:      Cervical back: He exhibits no tenderness.   Lymphadenopathy:        Head (left side): Preauricular adenopathy present.     He has no cervical adenopathy.   Neurological: no focal deficit. He is alert and oriented to person, place, and time.   Skin: Skin is warm and dry. Capillary refill takes 2 to 3 seconds.   Psychiatric: His behavior is normal. Mood normal.   Nursing note and vitals reviewed.      Assessment:       1. Acute bacterial sinusitis          Plan:         Acute bacterial sinusitis  -     dexamethasone injection 8 mg  -     predniSONE (DELTASONE) 20 MG tablet; Take 1 tablet (20 mg total) by mouth 2 (two) times daily. for 4 days  Dispense: 8 tablet; Refill: 0  -     amoxicillin-clavulanate 875-125mg (AUGMENTIN) 875-125 mg per tablet; Take 1 tablet by mouth every 12 (twelve) hours. for 7 days  Dispense: 14 tablet; Refill: 0       Start prednisone pills tomorrow and take twice a day for 3-4 days.  Always take with food to minimize stomach upset.    Augmentin twice a day for 7 days.  It is always advisable to take probiotics for intestinal health that you can purchase over-the-counter when taking antibiotics to help maintain the normal good bacteria in the intestines.    Continue Flonase daily as already prescribed.    You may also consider using plain Mucinex or the generic guaifenesin 1200 mg twice a day until symptoms have resolved to help thin secretions and break down mucus if needed  Increase your fluid intake significantly to help thin secretions/mucus

## 2022-11-17 ENCOUNTER — OFFICE VISIT (OUTPATIENT)
Dept: FAMILY MEDICINE | Facility: CLINIC | Age: 62
End: 2022-11-17
Payer: COMMERCIAL

## 2022-11-17 ENCOUNTER — LAB VISIT (OUTPATIENT)
Dept: LAB | Facility: HOSPITAL | Age: 62
End: 2022-11-17
Attending: INTERNAL MEDICINE
Payer: COMMERCIAL

## 2022-11-17 VITALS
DIASTOLIC BLOOD PRESSURE: 68 MMHG | SYSTOLIC BLOOD PRESSURE: 132 MMHG | HEIGHT: 72 IN | WEIGHT: 225 LBS | BODY MASS INDEX: 30.48 KG/M2 | HEART RATE: 82 BPM

## 2022-11-17 DIAGNOSIS — I48.11 LONGSTANDING PERSISTENT ATRIAL FIBRILLATION: ICD-10-CM

## 2022-11-17 DIAGNOSIS — J32.9 RECURRENT SINUSITIS: ICD-10-CM

## 2022-11-17 DIAGNOSIS — Z79.01 ANTICOAGULATED: Chronic | ICD-10-CM

## 2022-11-17 DIAGNOSIS — I10 ESSENTIAL HYPERTENSION, MALIGNANT: ICD-10-CM

## 2022-11-17 DIAGNOSIS — I48.19 PERSISTENT ATRIAL FIBRILLATION: Primary | Chronic | ICD-10-CM

## 2022-11-17 DIAGNOSIS — R79.0 LOW MAGNESIUM LEVEL: ICD-10-CM

## 2022-11-17 DIAGNOSIS — F51.04 PSYCHOPHYSIOLOGICAL INSOMNIA: ICD-10-CM

## 2022-11-17 DIAGNOSIS — E78.2 MIXED DYSLIPIDEMIA: Chronic | ICD-10-CM

## 2022-11-17 DIAGNOSIS — Z12.5 SPECIAL SCREENING FOR MALIGNANT NEOPLASM OF PROSTATE: ICD-10-CM

## 2022-11-17 DIAGNOSIS — E78.5 HYPERLIPEMIA: Primary | ICD-10-CM

## 2022-11-17 LAB
ALBUMIN SERPL BCP-MCNC: 4.2 G/DL (ref 3.5–5.2)
ALP SERPL-CCNC: 54 U/L (ref 55–135)
ALT SERPL W/O P-5'-P-CCNC: 28 U/L (ref 10–44)
ANION GAP SERPL CALC-SCNC: 9 MMOL/L (ref 8–16)
AST SERPL-CCNC: 27 U/L (ref 10–40)
BILIRUB SERPL-MCNC: 1 MG/DL (ref 0.1–1)
BUN SERPL-MCNC: 14 MG/DL (ref 8–23)
CALCIUM SERPL-MCNC: 8.9 MG/DL (ref 8.7–10.5)
CHLORIDE SERPL-SCNC: 100 MMOL/L (ref 95–110)
CHOLEST SERPL-MCNC: 154 MG/DL (ref 120–199)
CHOLEST/HDLC SERPL: 4.3 {RATIO} (ref 2–5)
CO2 SERPL-SCNC: 28 MMOL/L (ref 23–29)
CREAT SERPL-MCNC: 0.9 MG/DL (ref 0.5–1.4)
ERYTHROCYTE [DISTWIDTH] IN BLOOD BY AUTOMATED COUNT: 13.2 % (ref 11.5–14.5)
EST. GFR  (NO RACE VARIABLE): >60 ML/MIN/1.73 M^2
ESTIMATED AVG GLUCOSE: 117 MG/DL (ref 68–131)
GLUCOSE SERPL-MCNC: 132 MG/DL (ref 70–110)
HBA1C MFR BLD: 5.7 % (ref 4.5–6.2)
HCT VFR BLD AUTO: 39.1 % (ref 40–54)
HDLC SERPL-MCNC: 36 MG/DL (ref 40–75)
HDLC SERPL: 23.4 % (ref 20–50)
HGB BLD-MCNC: 14.3 G/DL (ref 14–18)
LDLC SERPL CALC-MCNC: 81.8 MG/DL (ref 63–159)
MAGNESIUM SERPL-MCNC: 1.4 MG/DL (ref 1.6–2.6)
MCH RBC QN AUTO: 32.4 PG (ref 27–31)
MCHC RBC AUTO-ENTMCNC: 36.6 G/DL (ref 32–36)
MCV RBC AUTO: 89 FL (ref 82–98)
NONHDLC SERPL-MCNC: 118 MG/DL
PLATELET # BLD AUTO: 161 K/UL (ref 150–450)
PMV BLD AUTO: 9.9 FL (ref 9.2–12.9)
POTASSIUM SERPL-SCNC: 3.5 MMOL/L (ref 3.5–5.1)
PROT SERPL-MCNC: 6.7 G/DL (ref 6–8.4)
RBC # BLD AUTO: 4.42 M/UL (ref 4.6–6.2)
SODIUM SERPL-SCNC: 137 MMOL/L (ref 136–145)
TRIGL SERPL-MCNC: 181 MG/DL (ref 30–150)
WBC # BLD AUTO: 5.91 K/UL (ref 3.9–12.7)

## 2022-11-17 PROCEDURE — 3078F PR MOST RECENT DIASTOLIC BLOOD PRESSURE < 80 MM HG: ICD-10-PCS | Mod: CPTII,S$GLB,, | Performed by: INTERNAL MEDICINE

## 2022-11-17 PROCEDURE — 3075F SYST BP GE 130 - 139MM HG: CPT | Mod: CPTII,S$GLB,, | Performed by: INTERNAL MEDICINE

## 2022-11-17 PROCEDURE — 85027 COMPLETE CBC AUTOMATED: CPT | Performed by: INTERNAL MEDICINE

## 2022-11-17 PROCEDURE — 80053 COMPREHEN METABOLIC PANEL: CPT | Performed by: INTERNAL MEDICINE

## 2022-11-17 PROCEDURE — 1159F MED LIST DOCD IN RCRD: CPT | Mod: CPTII,S$GLB,, | Performed by: INTERNAL MEDICINE

## 2022-11-17 PROCEDURE — 3008F BODY MASS INDEX DOCD: CPT | Mod: CPTII,S$GLB,, | Performed by: INTERNAL MEDICINE

## 2022-11-17 PROCEDURE — 3075F PR MOST RECENT SYSTOLIC BLOOD PRESS GE 130-139MM HG: ICD-10-PCS | Mod: CPTII,S$GLB,, | Performed by: INTERNAL MEDICINE

## 2022-11-17 PROCEDURE — 99214 OFFICE O/P EST MOD 30 MIN: CPT | Mod: S$GLB,,, | Performed by: INTERNAL MEDICINE

## 2022-11-17 PROCEDURE — 3044F PR MOST RECENT HEMOGLOBIN A1C LEVEL <7.0%: ICD-10-PCS | Mod: CPTII,S$GLB,, | Performed by: INTERNAL MEDICINE

## 2022-11-17 PROCEDURE — 83036 HEMOGLOBIN GLYCOSYLATED A1C: CPT | Performed by: INTERNAL MEDICINE

## 2022-11-17 PROCEDURE — 1160F PR REVIEW ALL MEDS BY PRESCRIBER/CLIN PHARMACIST DOCUMENTED: ICD-10-PCS | Mod: CPTII,S$GLB,, | Performed by: INTERNAL MEDICINE

## 2022-11-17 PROCEDURE — 3078F DIAST BP <80 MM HG: CPT | Mod: CPTII,S$GLB,, | Performed by: INTERNAL MEDICINE

## 2022-11-17 PROCEDURE — 84154 ASSAY OF PSA FREE: CPT | Performed by: INTERNAL MEDICINE

## 2022-11-17 PROCEDURE — 99214 PR OFFICE/OUTPT VISIT, EST, LEVL IV, 30-39 MIN: ICD-10-PCS | Mod: S$GLB,,, | Performed by: INTERNAL MEDICINE

## 2022-11-17 PROCEDURE — 36415 COLL VENOUS BLD VENIPUNCTURE: CPT | Performed by: INTERNAL MEDICINE

## 2022-11-17 PROCEDURE — 83735 ASSAY OF MAGNESIUM: CPT | Performed by: INTERNAL MEDICINE

## 2022-11-17 PROCEDURE — 1160F RVW MEDS BY RX/DR IN RCRD: CPT | Mod: CPTII,S$GLB,, | Performed by: INTERNAL MEDICINE

## 2022-11-17 PROCEDURE — 84153 ASSAY OF PSA TOTAL: CPT | Performed by: INTERNAL MEDICINE

## 2022-11-17 PROCEDURE — 3044F HG A1C LEVEL LT 7.0%: CPT | Mod: CPTII,S$GLB,, | Performed by: INTERNAL MEDICINE

## 2022-11-17 PROCEDURE — 3008F PR BODY MASS INDEX (BMI) DOCUMENTED: ICD-10-PCS | Mod: CPTII,S$GLB,, | Performed by: INTERNAL MEDICINE

## 2022-11-17 PROCEDURE — 80061 LIPID PANEL: CPT | Performed by: INTERNAL MEDICINE

## 2022-11-17 PROCEDURE — 1159F PR MEDICATION LIST DOCUMENTED IN MEDICAL RECORD: ICD-10-PCS | Mod: CPTII,S$GLB,, | Performed by: INTERNAL MEDICINE

## 2022-11-17 RX ORDER — IPRATROPIUM BROMIDE 42 UG/1
2 SPRAY, METERED NASAL 2 TIMES DAILY
Qty: 15 ML | Refills: 5 | Status: SHIPPED | OUTPATIENT
Start: 2022-11-17 | End: 2023-05-15

## 2022-11-17 RX ORDER — LANOLIN ALCOHOL/MO/W.PET/CERES
400 CREAM (GRAM) TOPICAL DAILY
Qty: 90 TABLET | Refills: 3 | Status: SHIPPED | OUTPATIENT
Start: 2022-11-17 | End: 2023-12-29 | Stop reason: SDUPTHER
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
## 2022-11-18 ENCOUNTER — PATIENT MESSAGE (OUTPATIENT)
Dept: FAMILY MEDICINE | Facility: CLINIC | Age: 62
End: 2022-11-18

## 2022-11-18 LAB
PSA FREE MFR SERPL: 16 %
PSA FREE SERPL-MCNC: 0.24 NG/ML
PSA SERPL-MCNC: 1.5 NG/ML (ref 0–4)

## 2022-12-11 DIAGNOSIS — R21 RASH: ICD-10-CM

## 2022-12-12 RX ORDER — CLOTRIMAZOLE AND BETAMETHASONE DIPROPIONATE 10; .64 MG/G; MG/G
CREAM TOPICAL
Qty: 45 G | Refills: 0 | OUTPATIENT
Start: 2022-12-12

## 2023-01-20 ENCOUNTER — LAB VISIT (OUTPATIENT)
Dept: LAB | Facility: HOSPITAL | Age: 63
End: 2023-01-20
Attending: DERMATOLOGY
Payer: COMMERCIAL

## 2023-01-20 ENCOUNTER — HOSPITAL ENCOUNTER (OUTPATIENT)
Dept: RADIOLOGY | Facility: HOSPITAL | Age: 63
Discharge: HOME | End: 2023-01-20
Attending: DERMATOLOGY
Payer: COMMERCIAL

## 2023-01-20 ENCOUNTER — PATIENT MESSAGE (OUTPATIENT)
Dept: FAMILY MEDICINE | Facility: CLINIC | Age: 63
End: 2023-01-20

## 2023-01-20 DIAGNOSIS — R59.1 LYMPHADENOPATHY: ICD-10-CM

## 2023-01-20 DIAGNOSIS — D64.9 ANEMIA, UNSPECIFIED TYPE: ICD-10-CM

## 2023-01-20 DIAGNOSIS — Z12.11 SCREENING FOR COLON CANCER: Primary | ICD-10-CM

## 2023-01-20 DIAGNOSIS — E03.9 HYPOTHYROIDISM, UNSPECIFIED TYPE: ICD-10-CM

## 2023-01-20 DIAGNOSIS — L29.9 PRURITUS: ICD-10-CM

## 2023-01-20 LAB
ERYTHROCYTE [SEDIMENTATION RATE] IN BLOOD BY WESTERGREN METHOD: 1 MM/HR (ref 0–10)
FERRITIN SERPL-MCNC: 60 NG/ML (ref 20–300)
TSH SERPL DL<=0.005 MIU/L-ACNC: 2.05 UIU/ML (ref 0.34–5.6)

## 2023-01-20 PROCEDURE — 82728 ASSAY OF FERRITIN: CPT | Performed by: DERMATOLOGY

## 2023-01-20 PROCEDURE — 71046 X-RAY EXAM CHEST 2 VIEWS: CPT | Mod: TC,PO

## 2023-01-20 PROCEDURE — 84155 ASSAY OF PROTEIN SERUM: CPT | Performed by: DERMATOLOGY

## 2023-01-20 PROCEDURE — 36415 COLL VENOUS BLD VENIPUNCTURE: CPT | Performed by: DERMATOLOGY

## 2023-01-20 PROCEDURE — 85651 RBC SED RATE NONAUTOMATED: CPT | Performed by: DERMATOLOGY

## 2023-01-20 PROCEDURE — 84165 PROTEIN E-PHORESIS SERUM: CPT | Performed by: DERMATOLOGY

## 2023-01-20 PROCEDURE — 84443 ASSAY THYROID STIM HORMONE: CPT | Performed by: DERMATOLOGY

## 2023-01-20 NOTE — PROGRESS NOTES
Billie Lawrence-     Dr Isiah BARBER [facility] - sent me a message that you will be due now for Colon cancer screening . She is trying to be sure that you do  not have a cancer lurking anywhere in your body which could cause you itching or pruritus.    To that effect, I am sending a referral to Dr. Sánchez Christian MD Gastroenter[facility] at below phone number and address.  Please call his office to set up a mutually convenient appointment for this procedure.  My office in turn will fax also the formal referral letter for him.    Best regards     Dr. Jessica BARBER      Referral Details    Referred By   Referred To   Bassam Lopez MD  [address]  [address]  [facility] [address]  Phone: [phone]  Fax: [phone] Diagnoses: Screening for colon cancer  Order: Ambulatory referral/consult to Gastroenterology  Reason: Specialty Services Required [place]  [address] [facility] [address]  Phone: [phone]  Fax: [phone]

## 2023-01-21 LAB
ALBUMIN SERPL ELPH-MCNC: 4.3 G/DL (ref 2.9–4.4)
ALBUMIN/GLOB SERPL: 1.7 {RATIO} (ref 0.7–1.7)
ALPHA1 GLOB SERPL ELPH-MCNC: 0.2 G/DL (ref 0–0.4)
ALPHA2 GLOB SERPL ELPH-MCNC: 0.7 G/DL (ref 0.4–1)
B-GLOBULIN SERPL ELPH-MCNC: 1.1 G/DL (ref 0.7–1.3)
GAMMA GLOB SERPL ELPH-MCNC: 0.6 G/DL (ref 0.4–1.8)
GLOBULIN SER CALC-MCNC: 2.6 G/DL (ref 2.2–3.9)
LABORATORY COMMENT REPORT: NORMAL
M PROTEIN SERPL ELPH-MCNC: NORMAL G/DL
PROT SERPL-MCNC: 6.9 G/DL (ref 6–8.5)

## 2023-02-15 ENCOUNTER — LAB VISIT (OUTPATIENT)
Dept: LAB | Facility: HOSPITAL | Age: 63
End: 2023-02-15
Attending: STUDENT IN AN ORGANIZED HEALTH CARE EDUCATION/TRAINING PROGRAM
Payer: COMMERCIAL

## 2023-02-15 ENCOUNTER — OFFICE VISIT (OUTPATIENT)
Dept: ALLERGY | Facility: CLINIC | Age: 63
End: 2023-02-15
Payer: COMMERCIAL

## 2023-02-15 VITALS — HEIGHT: 72 IN | BODY MASS INDEX: 31.12 KG/M2 | WEIGHT: 229.75 LBS

## 2023-02-15 DIAGNOSIS — F45.8 PRURITUS SINE MATERIA: ICD-10-CM

## 2023-02-15 DIAGNOSIS — F45.8 PRURITUS SINE MATERIA: Primary | ICD-10-CM

## 2023-02-15 PROCEDURE — 1159F PR MEDICATION LIST DOCUMENTED IN MEDICAL RECORD: ICD-10-PCS | Mod: CPTII,S$GLB,, | Performed by: STUDENT IN AN ORGANIZED HEALTH CARE EDUCATION/TRAINING PROGRAM

## 2023-02-15 PROCEDURE — 99999 PR PBB SHADOW E&M-EST. PATIENT-LVL IV: CPT | Mod: PBBFAC,,, | Performed by: STUDENT IN AN ORGANIZED HEALTH CARE EDUCATION/TRAINING PROGRAM

## 2023-02-15 PROCEDURE — 86003 ALLG SPEC IGE CRUDE XTRC EA: CPT | Mod: 59 | Performed by: STUDENT IN AN ORGANIZED HEALTH CARE EDUCATION/TRAINING PROGRAM

## 2023-02-15 PROCEDURE — 3008F BODY MASS INDEX DOCD: CPT | Mod: CPTII,S$GLB,, | Performed by: STUDENT IN AN ORGANIZED HEALTH CARE EDUCATION/TRAINING PROGRAM

## 2023-02-15 PROCEDURE — 82785 ASSAY OF IGE: CPT | Performed by: STUDENT IN AN ORGANIZED HEALTH CARE EDUCATION/TRAINING PROGRAM

## 2023-02-15 PROCEDURE — 1159F MED LIST DOCD IN RCRD: CPT | Mod: CPTII,S$GLB,, | Performed by: STUDENT IN AN ORGANIZED HEALTH CARE EDUCATION/TRAINING PROGRAM

## 2023-02-15 PROCEDURE — 86003 ALLG SPEC IGE CRUDE XTRC EA: CPT | Performed by: STUDENT IN AN ORGANIZED HEALTH CARE EDUCATION/TRAINING PROGRAM

## 2023-02-15 PROCEDURE — 3008F PR BODY MASS INDEX (BMI) DOCUMENTED: ICD-10-PCS | Mod: CPTII,S$GLB,, | Performed by: STUDENT IN AN ORGANIZED HEALTH CARE EDUCATION/TRAINING PROGRAM

## 2023-02-15 PROCEDURE — 99204 PR OFFICE/OUTPT VISIT, NEW, LEVL IV, 45-59 MIN: ICD-10-PCS | Mod: S$GLB,,, | Performed by: STUDENT IN AN ORGANIZED HEALTH CARE EDUCATION/TRAINING PROGRAM

## 2023-02-15 PROCEDURE — 99204 OFFICE O/P NEW MOD 45 MIN: CPT | Mod: S$GLB,,, | Performed by: STUDENT IN AN ORGANIZED HEALTH CARE EDUCATION/TRAINING PROGRAM

## 2023-02-15 PROCEDURE — 99999 PR PBB SHADOW E&M-EST. PATIENT-LVL IV: ICD-10-PCS | Mod: PBBFAC,,, | Performed by: STUDENT IN AN ORGANIZED HEALTH CARE EDUCATION/TRAINING PROGRAM

## 2023-02-15 RX ORDER — HYDROCODONE BITARTRATE AND ACETAMINOPHEN 5; 325 MG/1; MG/1
1 TABLET ORAL EVERY 4 HOURS PRN
COMMUNITY
Start: 2023-01-24 | End: 2023-05-15

## 2023-02-15 RX ORDER — AMOXICILLIN 500 MG/1
500 CAPSULE ORAL 3 TIMES DAILY
COMMUNITY
Start: 2023-01-24 | End: 2023-07-20

## 2023-02-15 NOTE — PATIENT INSTRUCTIONS
Start taking cetirizine or loratadine 2 tablets (20mg) twice daily until follow up    Any future rash or hive, please take pictures

## 2023-02-15 NOTE — PROGRESS NOTES
ALLERGY & IMMUNOLOGY CLINIC -  NEW  PATIENT     HISTORY OF PRESENT ILLNESS     Patient ID: Howard Segura is a 62 y.o. male    CC:   Chief Complaint   Patient presents with    Itching     Starting months ago       HPI: Howard Segura is a 62 y.o. male presents for evaluation of:    Pruritus: States that symptoms started approximately 3 years ago after breaking his hip. At the time post-op only had pruritus in the areas of the scar. Over the previous year, states that symptoms have gradually worsened and now endorses itching that randomly can affect his scalp, arms, legs, abdomen and back. Symptoms occur randomly throughout the day without inciting factor, and itching does not often include a rash. He is on Eliquis and develops bruising from the itching and he states that he will have a hive that can come and go within minutes. Has been prescribed multiple creams and does not like the consistency of Vanicream cream, but applies Sarna daily after showering as well as needed for itching which does provide relief. Has tried avoiding [employer] lattes, tree nuts, vitamins and prilosec all without relief of symptoms. Denies fevers, chills, unexplained weight loss, nausea, vomiting, diarrhea, dizziness, lightheadedness. Has changed to unscented and non-fragranced detergents, soaps, etc all without relief of symptoms. Itching can last as few as a few seconds to minutes in duration. He up to date on cancer screenings aside from colonoscopy     H/o Asthma: denies  H/o Rhinitis: denies  Oral Allergy:  denies  Food Allergy: denies  Venom Allergy: denies  Latex Allergy: denies  Env/Occ: denies any environmental or occupational exposures     REVIEW OF SYSTEMS     CONST: no F/C/NS, no unintentional weight changes  EYES: Denies itchy/watery eyes, denies injected eyes  EARS: no hearing loss, no sensation of fullness  NOSE: no congestion, no rhinorrhea, no discharge, no itching, no sneezing  PULM: no SOB, no wheezing, no cough, no  snoring  CV: no CP, no palpitations, no leg swelling  GI: no dysphagia, no heartburn, no pain, no N/V/D  DERM: no rashes, no skin breaks    Balance of review of systems negative except as mentioned above     MEDICAL HISTORY     MedHx: active problems reviewed  SurgHx:   Past Surgical History:   Procedure Laterality Date    EXCISION OF SQUAMOUS CELL CARCINOMA      Head    KNEE SURGERY      OPEN REDUCTION AND INTERNAL FIXATION (ORIF) OF FRACTURE OF TIBIAL PLATEAU Left     OPEN REDUCTION AND INTERNAL FIXATION (ORIF) OF INJURY OF HIP Right 12/22/2019    Procedure: ORIF, HIP;  Surgeon: Todd Andrews Jr., MD;  Location: [facility] OR;  Service: Orthopedics;  Laterality: Right;    QUADRICEPS TENDON REPAIR Left     ROTATOR CUFF REPAIR Left 2012    ROTATOR CUFF REPAIR Right 1994    SINUS SURGERY      TREATMENT OF CARDIAC ARRHYTHMIA N/A 6/5/2020    Procedure: CARDIOVERSION;  Surgeon: James Richter MD;  Location: [facility] CATH/EP LAB;  Service: Cardiology;  Laterality: N/A;       SocHx:   -Previous Smoker  -Pets: denies  -School/Work: Retired from [employer]    FamHx:   Otherwise no Family History of asthma, allergic rhinitis, atopic dermatitis, drug allergy, food allergy, venom allergy or immune deficiency.     Allergies: see below  Medications: MAR reviewed       PHYSICAL EXAM     VS: Ht 6' (1.829 m)   Wt 104.2 kg (229 lb 11.5 oz)   BMI 31.16 kg/m²   GENERAL: awake, alert, cooperative with exam  EYES: PERRL, EOMI, no conjunctival injection, no discharge, no infraorbital shiners  EARS: external auditory canals normal B/L, TM normal B/L  NOSE: NT 2+ and pink B/L, no stringing mucous, no polyps  ORAL: MMM, no ulcers, no thrush, no cobblestoning  NECK: supple, trachea midline, no cervical or submandibular LAD  LUNGS: CTAB, no w/r/c, no increased WOB  HEART: Normal Rate and regular rhythm, normal S1/S2, no m/g/r  ABDOMEN: Normoactive bowel sounds, soft, non-tender, non-distended, no HSM  EXTREMITIES: +2 distal pulses, no  c/c/e  DERM: Small bruise to right forearm and left interior upper arm   NEURO: normal gait, no facial asymmetry     LABORATORY STUDIES     Reviewed CBC and no eosinophils on previous differentials  CMP did not show elevated of LFTs  TSH wnl   SPEP WNL      ALLERGEN TESTING     Immunocaps: Ordered Today     IMAGING & OTHER DIAGNOSTICS     FINDINGS:  PA and lateral chest without comparisons show normal cardiomediastinal silhouette.     Lungs are clear. Pulmonary vasculature is normal. Bones are normal.     ASSESSMENT & PLAN     Howard Segura is a 62 y.o. 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 Additionally follows with dermatology as well and has been UTD with malignancy screenings aside from colonoscopy. Most recent CBC did not have a differential, but from May 2022 when he was experiencing similar symptoms he did not have any eosinophils that would warrant a parasitic infection evaluation. His LFTs and TSH levels all within normal limits. He does have xerosis, but the intermittent episodes of the pruritus without rash or identifiable symptoms is not consistent with an IgE mediated allergic reaction. Would not suspect to be side effect of medication given the intermittent nature of symptoms, lack of rash, and random locations of the rash. Possibly neurogenic pruritus, will trial course of high dose antihistamines for the next to weeks and check allergies today  -     Dermatophagoides Lit[place]; Future; Expected date: 02/15/2023  -     Dermatophagoides Pteronyssinus; Future; Expected date: 02/15/2023  -     Bermuda; Future; Expected date: 02/15/2023  -     Miquel; Future; Expected date: 02/15/2023  -     [place]; Future; Expected date: 02/15/2023  -     English Plantain; Future; Expected date: 02/15/2023  -     Oak; Future; Expected date: 02/15/2023  -     Pecan; Future; Expected date: 02/15/2023  -     Yung Elder; Future; Expected  date: 02/15/2023  -     Ragweed; Future; Expected date: 02/15/2023  -     Alternaria; Future; Expected date: 02/15/2023  -     Aspergillus; Future; Expected date: 02/15/2023  -     Cat; Future; Expected date: 02/15/2023  -     Cockroach; Future; Expected date: 02/15/2023  -     Dog; Future; Expected date: 02/15/2023  -     IgE; Future; Expected date: 02/15/2023        Follow up: 2 weeks       Steffen Cuba MD  Department of Allergy&Immunology

## 2023-02-16 LAB — IGE SERPL-ACNC: <35 IU/ML (ref 0–100)

## 2023-02-20 LAB
A ALTERNATA IGE QN: <0.1 KU/L
A FUMIGATUS IGE QN: <0.1 KU/L
BERMUDA GRASS IGE QN: <0.1 KU/L
CAT DANDER IGE QN: <0.1 KU/L
CEDAR IGE QN: <0.1 KU/L
D FARINAE IGE QN: <0.1 KU/L
D PTERONYSS IGE QN: <0.1 KU/L
DEPRECATED A ALTERNATA IGE RAST QL: NORMAL
DEPRECATED A FUMIGATUS IGE RAST QL: NORMAL
DEPRECATED BERMUDA GRASS IGE RAST QL: NORMAL
DEPRECATED CAT DANDER IGE RAST QL: NORMAL
DEPRECATED CEDAR IGE RAST QL: NORMAL
DEPRECATED D FARINAE IGE RAST QL: NORMAL
DEPRECATED D PTERONYSS IGE RAST QL: NORMAL
DEPRECATED DOG DANDER IGE RAST QL: NORMAL
DEPRECATED ELDER IGE RAST QL: NORMAL
DEPRECATED ENGL PLANTAIN IGE RAST QL: NORMAL
DEPRECATED PECAN/HICK TREE IGE RAST QL: NORMAL
DEPRECATED ROACH IGE RAST QL: NORMAL
DEPRECATED TIMOTHY IGE RAST QL: NORMAL
DEPRECATED WEST RAGWEED IGE RAST QL: NORMAL
DEPRECATED WHITE OAK IGE RAST QL: NORMAL
DOG DANDER IGE QN: <0.1 KU/L
ELDER IGE QN: <0.1 KU/L
ENGL PLANTAIN IGE QN: <0.1 KU/L
PECAN/HICK TREE IGE QN: <0.1 KU/L
ROACH IGE QN: <0.1 KU/L
TIMOTHY IGE QN: <0.1 KU/L
WEST RAGWEED IGE QN: <0.1 KU/L
WHITE OAK IGE QN: <0.1 KU/L

## 2023-02-22 ENCOUNTER — PATIENT MESSAGE (OUTPATIENT)
Dept: ALLERGY | Facility: CLINIC | Age: 63
End: 2023-02-22
Payer: COMMERCIAL

## 2023-02-24 NOTE — PROGRESS NOTES
ALLERGY & IMMUNOLOGY CLINIC - ESTABLISHED PATIENT     HISTORY OF PRESENT ILLNESS     Patient ID: Howard Segura is a 62 y.o. male    CC:   Chief Complaint   Patient presents with    Follow-up       HPI: Howard Segura is a 62 y.o. male presents for evaluation of:    Pruritus: States since last visit, has experienced no relief in overall pruritus. Has been taking cetirizine 20mg BID and Famotidine 20mg BID and provided little to no relief of symptoms. Denies hives and denies skin rashes with symptoms.      REVIEW OF SYSTEMS     Balance of review of systems negative except as mentioned above     MEDICAL HISTORY     MedHx: active problems reviewed  SurgHx:   Past Surgical History:   Procedure Laterality Date    EXCISION OF SQUAMOUS CELL CARCINOMA      Head    KNEE SURGERY      OPEN REDUCTION AND INTERNAL FIXATION (ORIF) OF FRACTURE OF TIBIAL PLATEAU Left     OPEN REDUCTION AND INTERNAL FIXATION (ORIF) OF INJURY OF HIP Right 12/22/2019    Procedure: ORIF, HIP;  Surgeon: Todd Andrews Jr., MD;  Location: [facility];  Service: Orthopedics;  Laterality: Right;    QUADRICEPS TENDON REPAIR Left     ROTATOR CUFF REPAIR Left 2012    ROTATOR CUFF REPAIR Right 1994    SINUS SURGERY      TREATMENT OF CARDIAC ARRHYTHMIA N/A 6/5/2020    Procedure: CARDIOVERSION;  Surgeon: James Richter MD;  Location: [facility] CATH/EP LAB;  Service: Cardiology;  Laterality: N/A;     Allergies: see below  Medications: MAR reviewed       PHYSICAL EXAM     VS: /84 (BP Location: Left arm, Patient Position: Sitting, BP Method: Large (Manual))   Ht 6' (1.829 m)   Wt 103.4 kg (227 lb 15.3 oz)   BMI 30.92 kg/m²   GENERAL: awake, alert, cooperative with exam  EYES: PERRL, EOMI, no conjunctival injection, no discharge, no infraorbital shiners  EARS: external auditory canals normal B/L, TM normal B/L  NECK: supple, trachea midline, no cervical or submandibular LAD  LUNGS: CTAB, no w/r/c, no increased WOB  HEART: Normal Rate and regular rhythm,  normal S1/S2, no m/g/r  EXTREMITIES: +2 distal pulses, no c/c/e  DERM: no rashes, no skin breaks     ALLERGEN TESTING     Area 6 Panel negative     ASSESSMENT & PLAN     Howard Segura is a 62 y.o. male with     Pruritus sine materia- Negative allergy testing by Immunocap method, most recent blood work did not show hematologic abnormalities nor kidney/liver dysfunction. Cancer screenings aside from colonoscopy UTD as well. Given lack of active dermatitis, will trial Gabapentin for neurogenic etiology. Scheduled to have patch testing as well with Dermatology in 1 month. Advised on risks and benefits of Gabapentin, especially sedation, advised to avoid operating motor vehicles and large machinery during 1 month trial  -     gabapentin (NEURONTIN) 300 MG capsule; Take 1 capsule (300 mg total) by mouth 3 (three) times daily.  Dispense: 90 capsule; Refill: 3        Follow up: 1 month      Steffen Cuba MD

## 2023-02-27 ENCOUNTER — OFFICE VISIT (OUTPATIENT)
Dept: ALLERGY | Facility: CLINIC | Age: 63
End: 2023-02-27
Payer: COMMERCIAL

## 2023-02-27 VITALS
DIASTOLIC BLOOD PRESSURE: 84 MMHG | WEIGHT: 227.94 LBS | HEIGHT: 72 IN | SYSTOLIC BLOOD PRESSURE: 133 MMHG | BODY MASS INDEX: 30.87 KG/M2

## 2023-02-27 DIAGNOSIS — F45.8 PRURITUS SINE MATERIA: Primary | ICD-10-CM

## 2023-02-27 PROCEDURE — 99999 PR PBB SHADOW E&M-EST. PATIENT-LVL III: ICD-10-PCS | Mod: PBBFAC,,, | Performed by: STUDENT IN AN ORGANIZED HEALTH CARE EDUCATION/TRAINING PROGRAM

## 2023-02-27 PROCEDURE — 3075F SYST BP GE 130 - 139MM HG: CPT | Mod: CPTII,S$GLB,, | Performed by: STUDENT IN AN ORGANIZED HEALTH CARE EDUCATION/TRAINING PROGRAM

## 2023-02-27 PROCEDURE — 3079F DIAST BP 80-89 MM HG: CPT | Mod: CPTII,S$GLB,, | Performed by: STUDENT IN AN ORGANIZED HEALTH CARE EDUCATION/TRAINING PROGRAM

## 2023-02-27 PROCEDURE — 99214 PR OFFICE/OUTPT VISIT, EST, LEVL IV, 30-39 MIN: ICD-10-PCS | Mod: S$GLB,,, | Performed by: STUDENT IN AN ORGANIZED HEALTH CARE EDUCATION/TRAINING PROGRAM

## 2023-02-27 PROCEDURE — 3008F BODY MASS INDEX DOCD: CPT | Mod: CPTII,S$GLB,, | Performed by: STUDENT IN AN ORGANIZED HEALTH CARE EDUCATION/TRAINING PROGRAM

## 2023-02-27 PROCEDURE — 99214 OFFICE O/P EST MOD 30 MIN: CPT | Mod: S$GLB,,, | Performed by: STUDENT IN AN ORGANIZED HEALTH CARE EDUCATION/TRAINING PROGRAM

## 2023-02-27 PROCEDURE — 99999 PR PBB SHADOW E&M-EST. PATIENT-LVL III: CPT | Mod: PBBFAC,,, | Performed by: STUDENT IN AN ORGANIZED HEALTH CARE EDUCATION/TRAINING PROGRAM

## 2023-02-27 PROCEDURE — 3008F PR BODY MASS INDEX (BMI) DOCUMENTED: ICD-10-PCS | Mod: CPTII,S$GLB,, | Performed by: STUDENT IN AN ORGANIZED HEALTH CARE EDUCATION/TRAINING PROGRAM

## 2023-02-27 PROCEDURE — 3079F PR MOST RECENT DIASTOLIC BLOOD PRESSURE 80-89 MM HG: ICD-10-PCS | Mod: CPTII,S$GLB,, | Performed by: STUDENT IN AN ORGANIZED HEALTH CARE EDUCATION/TRAINING PROGRAM

## 2023-02-27 PROCEDURE — 3075F PR MOST RECENT SYSTOLIC BLOOD PRESS GE 130-139MM HG: ICD-10-PCS | Mod: CPTII,S$GLB,, | Performed by: STUDENT IN AN ORGANIZED HEALTH CARE EDUCATION/TRAINING PROGRAM

## 2023-02-27 RX ORDER — GABAPENTIN 300 MG/1
300 CAPSULE ORAL 3 TIMES DAILY
Qty: 90 CAPSULE | Refills: 3 | Status: SHIPPED | OUTPATIENT
Start: 2023-02-27 | End: 2023-05-08

## 2023-03-06 ENCOUNTER — PATIENT MESSAGE (OUTPATIENT)
Dept: ALLERGY | Facility: CLINIC | Age: 63
End: 2023-03-06
Payer: COMMERCIAL

## 2023-03-09 ENCOUNTER — PATIENT MESSAGE (OUTPATIENT)
Dept: FAMILY MEDICINE | Facility: CLINIC | Age: 63
End: 2023-03-09

## 2023-03-13 ENCOUNTER — OFFICE VISIT (OUTPATIENT)
Dept: ALLERGY | Facility: CLINIC | Age: 63
End: 2023-03-13
Payer: COMMERCIAL

## 2023-03-13 VITALS
HEIGHT: 72 IN | SYSTOLIC BLOOD PRESSURE: 130 MMHG | BODY MASS INDEX: 31.8 KG/M2 | WEIGHT: 234.81 LBS | DIASTOLIC BLOOD PRESSURE: 82 MMHG

## 2023-03-13 DIAGNOSIS — F45.8 PRURITUS SINE MATERIA: Primary | ICD-10-CM

## 2023-03-13 PROCEDURE — 3008F PR BODY MASS INDEX (BMI) DOCUMENTED: ICD-10-PCS | Mod: CPTII,S$GLB,, | Performed by: STUDENT IN AN ORGANIZED HEALTH CARE EDUCATION/TRAINING PROGRAM

## 2023-03-13 PROCEDURE — 3075F PR MOST RECENT SYSTOLIC BLOOD PRESS GE 130-139MM HG: ICD-10-PCS | Mod: CPTII,S$GLB,, | Performed by: STUDENT IN AN ORGANIZED HEALTH CARE EDUCATION/TRAINING PROGRAM

## 2023-03-13 PROCEDURE — 99214 OFFICE O/P EST MOD 30 MIN: CPT | Mod: S$GLB,,, | Performed by: STUDENT IN AN ORGANIZED HEALTH CARE EDUCATION/TRAINING PROGRAM

## 2023-03-13 PROCEDURE — 3079F PR MOST RECENT DIASTOLIC BLOOD PRESSURE 80-89 MM HG: ICD-10-PCS | Mod: CPTII,S$GLB,, | Performed by: STUDENT IN AN ORGANIZED HEALTH CARE EDUCATION/TRAINING PROGRAM

## 2023-03-13 PROCEDURE — 3079F DIAST BP 80-89 MM HG: CPT | Mod: CPTII,S$GLB,, | Performed by: STUDENT IN AN ORGANIZED HEALTH CARE EDUCATION/TRAINING PROGRAM

## 2023-03-13 PROCEDURE — 3075F SYST BP GE 130 - 139MM HG: CPT | Mod: CPTII,S$GLB,, | Performed by: STUDENT IN AN ORGANIZED HEALTH CARE EDUCATION/TRAINING PROGRAM

## 2023-03-13 PROCEDURE — 99999 PR PBB SHADOW E&M-EST. PATIENT-LVL III: ICD-10-PCS | Mod: PBBFAC,,, | Performed by: STUDENT IN AN ORGANIZED HEALTH CARE EDUCATION/TRAINING PROGRAM

## 2023-03-13 PROCEDURE — 1159F MED LIST DOCD IN RCRD: CPT | Mod: CPTII,S$GLB,, | Performed by: STUDENT IN AN ORGANIZED HEALTH CARE EDUCATION/TRAINING PROGRAM

## 2023-03-13 PROCEDURE — 3008F BODY MASS INDEX DOCD: CPT | Mod: CPTII,S$GLB,, | Performed by: STUDENT IN AN ORGANIZED HEALTH CARE EDUCATION/TRAINING PROGRAM

## 2023-03-13 PROCEDURE — 99999 PR PBB SHADOW E&M-EST. PATIENT-LVL III: CPT | Mod: PBBFAC,,, | Performed by: STUDENT IN AN ORGANIZED HEALTH CARE EDUCATION/TRAINING PROGRAM

## 2023-03-13 PROCEDURE — 1159F PR MEDICATION LIST DOCUMENTED IN MEDICAL RECORD: ICD-10-PCS | Mod: CPTII,S$GLB,, | Performed by: STUDENT IN AN ORGANIZED HEALTH CARE EDUCATION/TRAINING PROGRAM

## 2023-03-13 PROCEDURE — 99214 PR OFFICE/OUTPT VISIT, EST, LEVL IV, 30-39 MIN: ICD-10-PCS | Mod: S$GLB,,, | Performed by: STUDENT IN AN ORGANIZED HEALTH CARE EDUCATION/TRAINING PROGRAM

## 2023-03-13 RX ORDER — SERTRALINE HYDROCHLORIDE 25 MG/1
25 TABLET, FILM COATED ORAL DAILY
Qty: 30 TABLET | Refills: 11 | Status: CANCELLED | OUTPATIENT
Start: 2023-03-13 | End: 2024-03-12

## 2023-03-13 RX ORDER — SERTRALINE HYDROCHLORIDE 50 MG/1
75 TABLET, FILM COATED ORAL DAILY
Qty: 45 TABLET | Refills: 1 | Status: SHIPPED | OUTPATIENT
Start: 2023-03-13 | End: 2023-05-08

## 2023-03-13 NOTE — PROGRESS NOTES
"ALLERGY & IMMUNOLOGY CLINIC -  ESTABLISHED PATIENT     HISTORY OF PRESENT ILLNESS     Patient ID: Howard Segura is a 62 y.o. male    CC:   Chief Complaint   Patient presents with    Follow-up     Follow up patient says medication  he was given isn't working       HPI: Howard Segura is a 62 y.o. male presents for evaluation of:  Accompanied by wife today who provides supplemental history    F/u Itching: States that following last appointment, started Gabapentin 300mg TID. He initially endorses resolution of pruritus after initiating, but after 3-4 days, he felt like its effects "wore off". He denies sedation from the medication and states he continues to experience daily episodes of pruritus. Mainly affects his upper extremities, originating in the axillary regions and migrating throughout the day to affect his wrists, hands, neck and back of scalp. Additionally can experience pruritus in the lower extremities, but attributes this to dry skin that he itches and "flakes off." He recently stopped the Gabapentin and is not interested in increasing dosage due to 7lb weight gain over the previous 1-2 weeks. Denies fevers, chills, respiratory and GI issues. Itching can occur for as little as 15 minutes at a time, but occurs throughout the day and seems to affect his sleep significantly. Additionally endorses multiple stressors including his elderly parents. Denies SI/HI at this time or recently.      REVIEW OF SYSTEMS     Balance of review of systems negative except as mentioned above     MEDICAL HISTORY     MedHx: active problems reviewed  SurgHx:   Past Surgical History:   Procedure Laterality Date    EXCISION OF SQUAMOUS CELL CARCINOMA      Head    KNEE SURGERY      OPEN REDUCTION AND INTERNAL FIXATION (ORIF) OF FRACTURE OF TIBIAL PLATEAU Left     OPEN REDUCTION AND INTERNAL FIXATION (ORIF) OF INJURY OF HIP Right 12/22/2019    Procedure: ORIF, HIP;  Surgeon: Todd Andrews Jr., MD;  Location: [facility];  Service: " Orthopedics;  Laterality: Right;    QUADRICEPS TENDON REPAIR Left     ROTATOR CUFF REPAIR Left 2012    ROTATOR CUFF REPAIR Right 1994    SINUS SURGERY      TREATMENT OF CARDIAC ARRHYTHMIA N/A 6/5/2020    Procedure: CARDIOVERSION;  Surgeon: James Richter MD;  Location: [facility] CATH/EP LAB;  Service: Cardiology;  Laterality: N/A;     Allergies: see below  Medications: MAR reviewed       PHYSICAL EXAM     VS: /82 (BP Location: Right arm, Patient Position: Sitting, BP Method: Large (Manual))   Ht 6' (1.829 m)   Wt 106.5 kg (234 lb 12.6 oz)   BMI 31.84 kg/m²   GENERAL: awake, alert, cooperative with exam  EYES: PERRL, EOMI, no conjunctival injection, no discharge, no infraorbital shiners  EARS: external auditory canals normal B/L  ORAL: MMM, no ulcers, no thrush, no cobblestoning  EXTREMITIES: +2 distal pulses, no c/c/e  DERM: no rashes, no skin breaks     ASSESSMENT & PLAN     Howard Segura is a 62 y.o. male with     Pruritus sine materia- Unclear etiology with negative allergy testing, normal LFTs and renal function as well. UTD with routine cancer screenings without red flag symptoms present. Now failed high dose oral antihistamines and discontinued gabapentin due to weight gain and little improvement. Discussed that neurogenic itch is very difficult to treat as there does not appear to be an underlying etiology at this time. Discussed the risks and benefits of further treatment, provided verbal consent to trial off label treatments for pruritus at this time given the significant worsening in quality of life. Suspect that he may benefits from an SSRI such as sertraline, as this can be mutually beneficial for anxiety and sleep issues he has been experiencing. Discussed side effects and elected to pursue 4-6 week trial of efficacy. Noted that Sertraline has been properly studied for cholestatic pruritus and there are additional case reports of efficacy as well.         Follow up: 6 weeks      Steffen  MD [place]

## 2023-05-08 ENCOUNTER — OFFICE VISIT (OUTPATIENT)
Dept: ALLERGY | Facility: CLINIC | Age: 63
End: 2023-05-08
Payer: COMMERCIAL

## 2023-05-08 VITALS — DIASTOLIC BLOOD PRESSURE: 83 MMHG | SYSTOLIC BLOOD PRESSURE: 124 MMHG | TEMPERATURE: 98 F | HEART RATE: 89 BPM

## 2023-05-08 DIAGNOSIS — F45.8 PRURITUS SINE MATERIA: Primary | ICD-10-CM

## 2023-05-08 DIAGNOSIS — I48.91 ATRIAL FIBRILLATION, UNSPECIFIED TYPE: ICD-10-CM

## 2023-05-08 PROCEDURE — 99204 PR OFFICE/OUTPT VISIT, NEW, LEVL IV, 45-59 MIN: ICD-10-PCS | Mod: S$GLB,,, | Performed by: ALLERGY & IMMUNOLOGY

## 2023-05-08 PROCEDURE — 1159F PR MEDICATION LIST DOCUMENTED IN MEDICAL RECORD: ICD-10-PCS | Mod: CPTII,S$GLB,, | Performed by: ALLERGY & IMMUNOLOGY

## 2023-05-08 PROCEDURE — 3074F PR MOST RECENT SYSTOLIC BLOOD PRESSURE < 130 MM HG: ICD-10-PCS | Mod: CPTII,S$GLB,, | Performed by: ALLERGY & IMMUNOLOGY

## 2023-05-08 PROCEDURE — 1160F RVW MEDS BY RX/DR IN RCRD: CPT | Mod: CPTII,S$GLB,, | Performed by: ALLERGY & IMMUNOLOGY

## 2023-05-08 PROCEDURE — 3079F PR MOST RECENT DIASTOLIC BLOOD PRESSURE 80-89 MM HG: ICD-10-PCS | Mod: CPTII,S$GLB,, | Performed by: ALLERGY & IMMUNOLOGY

## 2023-05-08 PROCEDURE — 1159F MED LIST DOCD IN RCRD: CPT | Mod: CPTII,S$GLB,, | Performed by: ALLERGY & IMMUNOLOGY

## 2023-05-08 PROCEDURE — 3074F SYST BP LT 130 MM HG: CPT | Mod: CPTII,S$GLB,, | Performed by: ALLERGY & IMMUNOLOGY

## 2023-05-08 PROCEDURE — 3079F DIAST BP 80-89 MM HG: CPT | Mod: CPTII,S$GLB,, | Performed by: ALLERGY & IMMUNOLOGY

## 2023-05-08 PROCEDURE — 99204 OFFICE O/P NEW MOD 45 MIN: CPT | Mod: S$GLB,,, | Performed by: ALLERGY & IMMUNOLOGY

## 2023-05-08 PROCEDURE — 1160F PR REVIEW ALL MEDS BY PRESCRIBER/CLIN PHARMACIST DOCUMENTED: ICD-10-PCS | Mod: CPTII,S$GLB,, | Performed by: ALLERGY & IMMUNOLOGY

## 2023-05-08 RX ORDER — DOXEPIN HYDROCHLORIDE 10 MG/1
10 CAPSULE ORAL NIGHTLY PRN
Qty: 30 CAPSULE | Refills: 1 | Status: SHIPPED | OUTPATIENT
Start: 2023-05-08 | End: 2023-07-20

## 2023-05-08 NOTE — PATIENT INSTRUCTIONS
I think you are classic for neurogenic itch .     Sarana can help.     You can try icy/hot as well.     Doxepin at night as needed 10 mg for itching.     You can use 20 mg if needed.     Remember may cause drowsiness so do not leave the bed.     Follow up in 4 weeks, sooner if needed.

## 2023-05-08 NOTE — LETTER
May 8, 2023        Bassam Lopez MD  [address]  [address]  [place] [address]             [facility] - Allergy  [address]  [address]  [place] [address]  Phone: [phone]  Fax: [phone]   Patient: Howard Segura   MR Number: [id number]   YOB: 1960   Date of Visit: 5/8/2023       Dear Dr. Lopez:    Thank you for referring Howard Segura to me for evaluation. Below are the relevant portions of my assessment and plan of care.            If you have questions, please do not hesitate to call me. I look forward to following Howard along with you.    Sincerely,      Diana Ram MD           CC  No Recipients

## 2023-05-08 NOTE — PROGRESS NOTES
Subjective:       Patient ID: Howard Segura is a 62 y.o. male.    Chief Complaint: itching  (Itching for the last year. )    HPI    Pt presents as a new patient   For itching     Pt states duration has been about a year.   He has tried elevated doses of zyrtec.   This wasn't with relief.   He did get urticaria associated as well.     He fell off his stairs and hit his ribs and broke his hip as well.   Itching in the inner arms   And outer legs    He mainly noticed this after his hip surgery.     Component      Latest Ref Rng & Units 1/20/2023   TSH      0.340 - 5.600 uIU/mL 2.050       Review of Systems    General: neg unexpected weight changes, fevers, chills, night sweats, malaise  HEENT: see hpi, Neg eye pain, vision changes, ear drainage, nose bleeds, throat tightness, sores in the mouth  CV: Neg chest pain, palpitations, swelling  Resp: see hpi, neg shortness of breath, hemoptysis, cough  GI: see hpi, neg dysphagia, night abdominal pain, reflux, chronic diarrhea, chronic constipation  Derm: See Hpi, neg new rash, neg flushing  Mu/sk: Neg joint pain, joint swelling   Psych: Neg anxiety  neuro: neg chronic headaches, muscle weakness  Endo: neg heat/cold intolerance, chronic fatigue    Objective:     Vitals:    05/08/23 0855   BP: 124/83   Pulse: 89   Temp: 98.1 °F (36.7 °C)        Physical Exam    General: no acute distress, well developed well nourished   Skin: Neg rashes or lesions    Assessment:       1. Pruritus sine materia    2. Atrial fibrillation, unspecified type        Plan:       Pruritus sine materia  -     doxepin (SINEQUAN) 10 MG capsule; Take 1 capsule (10 mg total) by mouth nightly as needed (itching).  Dispense: 30 capsule; Refill: 1    Atrial fibrillation, unspecified type                Pruritus sine materia  5/23:  Most likely neurogenic   Doxepin 10 mg qhs prn   Consider 20 mg   Consider amitrypitaline 25 mg     Did not tolerate gabapentin   Did not benefit from zytec 2 bid   Did not benefit  from topical creams or therapies     Hives no longer once stopped creams.     Atrial fibrillation   5/23:  Continue eliquis  Discussed when on a sedation med to monitor for tripping hazards.       Follow up in 4 weeks, sooner if needed      Diana Ram M.D.  Allergy/Immunology  [facility]   [phone] phone  [phone] fax

## 2023-05-10 ENCOUNTER — PATIENT MESSAGE (OUTPATIENT)
Dept: FAMILY MEDICINE | Facility: CLINIC | Age: 63
End: 2023-05-10
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
## 2023-05-15 ENCOUNTER — OFFICE VISIT (OUTPATIENT)
Dept: FAMILY MEDICINE | Facility: CLINIC | Age: 63
End: 2023-05-15
Payer: COMMERCIAL

## 2023-05-15 VITALS
BODY MASS INDEX: 30.88 KG/M2 | SYSTOLIC BLOOD PRESSURE: 127 MMHG | HEIGHT: 72 IN | HEART RATE: 93 BPM | WEIGHT: 228 LBS | DIASTOLIC BLOOD PRESSURE: 88 MMHG

## 2023-05-15 DIAGNOSIS — I10 ESSENTIAL HYPERTENSION: Chronic | ICD-10-CM

## 2023-05-15 DIAGNOSIS — Z12.5 SCREENING FOR PROSTATE CANCER: ICD-10-CM

## 2023-05-15 DIAGNOSIS — F51.04 PSYCHOPHYSIOLOGICAL INSOMNIA: ICD-10-CM

## 2023-05-15 DIAGNOSIS — I48.19 PERSISTENT ATRIAL FIBRILLATION: Primary | Chronic | ICD-10-CM

## 2023-05-15 DIAGNOSIS — E78.2 MIXED DYSLIPIDEMIA: Chronic | ICD-10-CM

## 2023-05-15 DIAGNOSIS — Z79.01 ANTICOAGULATED: Chronic | ICD-10-CM

## 2023-05-15 PROCEDURE — 1159F MED LIST DOCD IN RCRD: CPT | Mod: CPTII,S$GLB,, | Performed by: INTERNAL MEDICINE

## 2023-05-15 PROCEDURE — 99214 OFFICE O/P EST MOD 30 MIN: CPT | Mod: S$GLB,,, | Performed by: INTERNAL MEDICINE

## 2023-05-15 PROCEDURE — 3074F SYST BP LT 130 MM HG: CPT | Mod: CPTII,S$GLB,, | Performed by: INTERNAL MEDICINE

## 2023-05-15 PROCEDURE — 1160F PR REVIEW ALL MEDS BY PRESCRIBER/CLIN PHARMACIST DOCUMENTED: ICD-10-PCS | Mod: CPTII,S$GLB,, | Performed by: INTERNAL MEDICINE

## 2023-05-15 PROCEDURE — 99214 PR OFFICE/OUTPT VISIT, EST, LEVL IV, 30-39 MIN: ICD-10-PCS | Mod: S$GLB,,, | Performed by: INTERNAL MEDICINE

## 2023-05-15 PROCEDURE — 3079F PR MOST RECENT DIASTOLIC BLOOD PRESSURE 80-89 MM HG: ICD-10-PCS | Mod: CPTII,S$GLB,, | Performed by: INTERNAL MEDICINE

## 2023-05-15 PROCEDURE — 3008F BODY MASS INDEX DOCD: CPT | Mod: CPTII,S$GLB,, | Performed by: INTERNAL MEDICINE

## 2023-05-15 PROCEDURE — 3074F PR MOST RECENT SYSTOLIC BLOOD PRESSURE < 130 MM HG: ICD-10-PCS | Mod: CPTII,S$GLB,, | Performed by: INTERNAL MEDICINE

## 2023-05-15 PROCEDURE — 1160F RVW MEDS BY RX/DR IN RCRD: CPT | Mod: CPTII,S$GLB,, | Performed by: INTERNAL MEDICINE

## 2023-05-15 PROCEDURE — 3008F PR BODY MASS INDEX (BMI) DOCUMENTED: ICD-10-PCS | Mod: CPTII,S$GLB,, | Performed by: INTERNAL MEDICINE

## 2023-05-15 PROCEDURE — 3079F DIAST BP 80-89 MM HG: CPT | Mod: CPTII,S$GLB,, | Performed by: INTERNAL MEDICINE

## 2023-05-15 PROCEDURE — 1159F PR MEDICATION LIST DOCUMENTED IN MEDICAL RECORD: ICD-10-PCS | Mod: CPTII,S$GLB,, | Performed by: INTERNAL MEDICINE

## 2023-05-15 RX ORDER — METOPROLOL TARTRATE 100 MG/1
100 TABLET ORAL 2 TIMES DAILY
Qty: 180 TABLET | Refills: 3 | Status: SHIPPED | OUTPATIENT
Start: 2023-05-15 | End: 2024-03-03 | Stop reason: SDUPTHER

## 2023-05-15 RX ORDER — AMLODIPINE BESYLATE 10 MG/1
5 TABLET ORAL EVERY MORNING
Qty: 90 TABLET | Refills: 3 | Status: SHIPPED | OUTPATIENT
Start: 2023-05-15 | End: 2023-07-20 | Stop reason: ALTCHOICE

## 2023-05-15 RX ORDER — AMLODIPINE BESYLATE 5 MG/1
5 TABLET ORAL EVERY MORNING
COMMUNITY
Start: 2023-05-03 | End: 2023-05-15 | Stop reason: SDUPTHER

## 2023-05-15 RX ORDER — ATORVASTATIN CALCIUM 20 MG/1
20 TABLET, FILM COATED ORAL DAILY
Qty: 90 TABLET | Refills: 3 | Status: SHIPPED | OUTPATIENT
Start: 2023-05-15 | End: 2023-07-20 | Stop reason: ALTCHOICE

## 2023-05-17 ENCOUNTER — PATIENT MESSAGE (OUTPATIENT)
Dept: ALLERGY | Facility: CLINIC | Age: 63
End: 2023-05-17

## 2023-05-18 ENCOUNTER — TELEPHONE (OUTPATIENT)
Dept: ALLERGY | Facility: CLINIC | Age: 63
End: 2023-05-18

## 2023-05-18 DIAGNOSIS — F45.8 PRURITUS SINE MATERIA: Primary | ICD-10-CM

## 2023-05-18 RX ORDER — DULOXETIN HYDROCHLORIDE 30 MG/1
CAPSULE, DELAYED RELEASE ORAL
Qty: 60 CAPSULE | Refills: 1 | Status: SHIPPED | OUTPATIENT
Start: 2023-05-18 | End: 2023-06-15

## 2023-05-18 NOTE — TELEPHONE ENCOUNTER
Severe itching, suspected from neurogenic origin.     Stop doxepin  Stop antihistamines.     Start cymbalta     Pruritus sine materia  -     DULoxetine (CYMBALTA) 30 MG capsule; 30 mg every night x 7 days, then 60 mg nightly  Dispense: 60 capsule; Refill: 1

## 2023-05-21 ENCOUNTER — PATIENT MESSAGE (OUTPATIENT)
Dept: FAMILY MEDICINE | Facility: CLINIC | Age: 63
End: 2023-05-21

## 2023-05-28 ENCOUNTER — PATIENT MESSAGE (OUTPATIENT)
Dept: ALLERGY | Facility: CLINIC | Age: 63
End: 2023-05-28

## 2023-06-07 ENCOUNTER — OFFICE VISIT (OUTPATIENT)
Dept: ALLERGY | Facility: CLINIC | Age: 63
End: 2023-06-07
Payer: COMMERCIAL

## 2023-06-07 VITALS — DIASTOLIC BLOOD PRESSURE: 79 MMHG | SYSTOLIC BLOOD PRESSURE: 117 MMHG | TEMPERATURE: 98 F | HEART RATE: 93 BPM

## 2023-06-07 DIAGNOSIS — F45.8 PRURITUS SINE MATERIA: Primary | ICD-10-CM

## 2023-06-07 PROCEDURE — 3078F DIAST BP <80 MM HG: CPT | Mod: CPTII,S$GLB,, | Performed by: ALLERGY & IMMUNOLOGY

## 2023-06-07 PROCEDURE — 1160F PR REVIEW ALL MEDS BY PRESCRIBER/CLIN PHARMACIST DOCUMENTED: ICD-10-PCS | Mod: CPTII,S$GLB,, | Performed by: ALLERGY & IMMUNOLOGY

## 2023-06-07 PROCEDURE — 3074F PR MOST RECENT SYSTOLIC BLOOD PRESSURE < 130 MM HG: ICD-10-PCS | Mod: CPTII,S$GLB,, | Performed by: ALLERGY & IMMUNOLOGY

## 2023-06-07 PROCEDURE — 99214 PR OFFICE/OUTPT VISIT, EST, LEVL IV, 30-39 MIN: ICD-10-PCS | Mod: S$GLB,,, | Performed by: ALLERGY & IMMUNOLOGY

## 2023-06-07 PROCEDURE — 3078F PR MOST RECENT DIASTOLIC BLOOD PRESSURE < 80 MM HG: ICD-10-PCS | Mod: CPTII,S$GLB,, | Performed by: ALLERGY & IMMUNOLOGY

## 2023-06-07 PROCEDURE — 3074F SYST BP LT 130 MM HG: CPT | Mod: CPTII,S$GLB,, | Performed by: ALLERGY & IMMUNOLOGY

## 2023-06-07 PROCEDURE — 1159F MED LIST DOCD IN RCRD: CPT | Mod: CPTII,S$GLB,, | Performed by: ALLERGY & IMMUNOLOGY

## 2023-06-07 PROCEDURE — 1159F PR MEDICATION LIST DOCUMENTED IN MEDICAL RECORD: ICD-10-PCS | Mod: CPTII,S$GLB,, | Performed by: ALLERGY & IMMUNOLOGY

## 2023-06-07 PROCEDURE — 99214 OFFICE O/P EST MOD 30 MIN: CPT | Mod: S$GLB,,, | Performed by: ALLERGY & IMMUNOLOGY

## 2023-06-07 PROCEDURE — 1160F RVW MEDS BY RX/DR IN RCRD: CPT | Mod: CPTII,S$GLB,, | Performed by: ALLERGY & IMMUNOLOGY

## 2023-06-07 NOTE — PATIENT INSTRUCTIONS
Will keep the skin moisturized    Continue the 30 mg nightly.     If the 30 mg continues to work, you do not have to increase.     If you want a slower increase in the dose, alternate 1 capsule vs 2 at night.     Follow up in 3 months, sooner if needed

## 2023-06-07 NOTE — LETTER
June 7, 2023        Bassam Lopez MD  [address]  [address]  [place] [address]             [facility]  [address]  [address]  [place] [address]  Phone: [phone]  Fax: [phone]   Patient: Howard Segura   MR Number: [id number]   YOB: 1960   Date of Visit: 6/7/2023       Dear Dr. Lopez:    Thank you for referring Howard Segura to me for evaluation. Below are the relevant portions of my assessment and plan of care.            If you have questions, please do not hesitate to call me. I look forward to following Howard along with you.    Sincerely,      Diana Ram MD           CC  No Recipients

## 2023-06-07 NOTE — PROGRESS NOTES
Subjective:       Patient ID: Howard Segura is a 62 y.o. male.    Chief Complaint: Pruritus sine materia (Doing good. Patient tried cymbalta, but didn't like the way it made him feel. He stopped it, but recently started taking it again and is doing ok on it. )    HPI    Pt presents  For itching     Last visit   5/2023    Since that time,     Itching only improved with cymbalta 30 mg qhs     He treid high doses of zyrtec and failed doxepin.     Pt states duration has been about a year.   He has tried elevated doses of zyrtec.   This wasn't with relief.   He did get urticaria associated as well.     He fell off his stairs and hit his ribs and broke his hip as well.   Itching in the inner arms   And outer legs    He mainly noticed this after his hip surgery.     Component      Latest Ref Rng & Units 1/20/2023   TSH      0.340 - 5.600 uIU/mL 2.050       Review of Systems    General: neg unexpected weight changes, fevers, chills, night sweats, malaise  HEENT: see hpi, Neg eye pain, vision changes, ear drainage, nose bleeds, throat tightness, sores in the mouth  CV: Neg chest pain, palpitations, swelling  Resp: see hpi, neg shortness of breath, hemoptysis, cough  GI: see hpi, neg dysphagia, night abdominal pain, reflux, chronic diarrhea, chronic constipation  Derm: See Hpi, neg new rash, neg flushing  Mu/sk: Neg joint pain, joint swelling   Psych: Neg anxiety  neuro: neg chronic headaches, muscle weakness  Endo: neg heat/cold intolerance, chronic fatigue    Objective:     Vitals:    06/07/23 1433   BP: 117/79   Pulse: 93   Temp: 98.2 °F (36.8 °C)        Physical Exam    General: no acute distress, well developed well nourished   Skin: Neg rashes or lesions    Assessment:       1. Pruritus sine materia          Plan:       Pruritus sine materia                  Pruritus sine materia  6/23:  Improved with cymbalta   Discussed trial x 7 days and see if needs increase  If he doesn't he can stay at  lower dose     5/23:  Most  likely neurogenic   Doxepin 10 mg qhs prn   Consider 20 mg   Consider amitrypitaline 25 mg     Did not tolerate gabapentin   Did not benefit from zytec 2 bid   Did not benefit from topical creams or therapies     Hives no longer once stopped creams.     Atrial fibrillation   5/23:  Continue eliquis  Discussed when on a sedation med to monitor for tripping hazards.       Follow up in 3 months, sooner if needed    Discussed case and charting 35 mins      Diana Ram M.D.  Allergy/Immunology  [facility]   [phone] phone  [phone] fax

## 2023-06-12 ENCOUNTER — PATIENT MESSAGE (OUTPATIENT)
Dept: ALLERGY | Facility: CLINIC | Age: 63
End: 2023-06-12

## 2023-06-14 ENCOUNTER — PATIENT MESSAGE (OUTPATIENT)
Dept: ALLERGY | Facility: CLINIC | Age: 63
End: 2023-06-14

## 2023-06-15 ENCOUNTER — TELEPHONE (OUTPATIENT)
Dept: ALLERGY | Facility: CLINIC | Age: 63
End: 2023-06-15

## 2023-06-15 DIAGNOSIS — F45.8 PRURITUS SINE MATERIA: Primary | ICD-10-CM

## 2023-06-15 RX ORDER — FLUOXETINE 10 MG/1
10 CAPSULE ORAL DAILY
Qty: 30 CAPSULE | Refills: 1 | Status: SHIPPED | OUTPATIENT
Start: 2023-06-15 | End: 2023-06-26

## 2023-06-15 NOTE — TELEPHONE ENCOUNTER
Discussed weaning off cymbalta and starting 10 mg of prozac due to itching not being controlled.       Pruritus sine materia  -     FLUoxetine 10 MG capsule; Take 1 capsule (10 mg total) by mouth once daily.  Dispense: 30 capsule; Refill: 1

## 2023-06-26 ENCOUNTER — PATIENT MESSAGE (OUTPATIENT)
Dept: ALLERGY | Facility: CLINIC | Age: 63
End: 2023-06-26

## 2023-06-26 DIAGNOSIS — F45.8 PRURITUS SINE MATERIA: Primary | ICD-10-CM

## 2023-06-26 RX ORDER — FLUOXETINE 10 MG/1
10 CAPSULE ORAL 2 TIMES DAILY
Qty: 60 CAPSULE | Refills: 3 | Status: SHIPPED | OUTPATIENT
Start: 2023-06-26 | End: 2023-07-10

## 2023-07-09 ENCOUNTER — PATIENT MESSAGE (OUTPATIENT)
Dept: ALLERGY | Facility: CLINIC | Age: 63
End: 2023-07-09

## 2023-07-10 DIAGNOSIS — F45.8 PRURITUS SINE MATERIA: Primary | ICD-10-CM

## 2023-07-10 RX ORDER — FLUOXETINE HYDROCHLORIDE 20 MG/1
20 CAPSULE ORAL 2 TIMES DAILY
Qty: 60 CAPSULE | Refills: 3 | Status: SHIPPED | OUTPATIENT
Start: 2023-07-10 | End: 2023-09-11 | Stop reason: SDUPTHER

## 2023-07-16 ENCOUNTER — PATIENT MESSAGE (OUTPATIENT)
Dept: FAMILY MEDICINE | Facility: CLINIC | Age: 63
End: 2023-07-16

## 2023-07-16 DIAGNOSIS — E78.2 MIXED DYSLIPIDEMIA: ICD-10-CM

## 2023-07-16 DIAGNOSIS — I48.19 PERSISTENT ATRIAL FIBRILLATION: Primary | ICD-10-CM

## 2023-07-16 DIAGNOSIS — R79.0 LOW MAGNESIUM LEVEL: ICD-10-CM

## 2023-07-16 DIAGNOSIS — R21 RASH: ICD-10-CM

## 2023-07-16 DIAGNOSIS — I10 ESSENTIAL HYPERTENSION: ICD-10-CM

## 2023-07-20 RX ORDER — AMLODIPINE BESYLATE AND ATORVASTATIN CALCIUM 5; 20 MG/1; MG/1
1 TABLET, FILM COATED ORAL DAILY
Qty: 90 TABLET | Refills: 0 | Status: SHIPPED | OUTPATIENT
Start: 2023-07-20 | End: 2023-08-14

## 2023-07-20 RX ORDER — POTASSIUM CHLORIDE 750 MG/1
10 CAPSULE, EXTENDED RELEASE ORAL 2 TIMES DAILY
Qty: 180 CAPSULE | Refills: 1 | Status: SHIPPED | OUTPATIENT
Start: 2023-07-20 | End: 2023-12-29 | Stop reason: SDUPTHER

## 2023-07-20 RX ORDER — TRIAMTERENE/HYDROCHLOROTHIAZID 37.5-25 MG
1 TABLET ORAL DAILY
COMMUNITY
End: 2023-11-27

## 2023-07-20 NOTE — TELEPHONE ENCOUNTER
Billie Lawrence    I am sending the prescription to your pharmacy.  Not sure but it seems lot of medications have been canceled in your list in the epic system.  Please give me an updated list of medication.  I did find amlodipine and atorvastatin separately but not as a combined form.    The best way that it works for me is that the pharmacy sends me requests the portal which click and it goes back immediately rather than digging out everything again.    Hope this works for you.  I have sent 90 days of Caduet to be determined at your follow-up also.      ===View-only below this line===      ----- Message -----       From:Howard Segura       Sent:7/16/2023 12:37 PM CDT         To:Bassam Lopez MD    Subject:Med refills    Dr Lopez can you please refill my Potassium Cl 10mg ER CAPSULES 1 tab bid, & Amlodipine/Atorvastatin 5/20 mg 1 tab qd.    [facility] has them closed out because we were trying a different regime but I went back to my regular meds & I am doing fine. Please make sure the potassium is the capsules as they are easier to swallow.     Thank you!  Howard

## 2023-07-20 NOTE — TELEPHONE ENCOUNTER
Pt wife called about refills for medications and update med list. Pt wife advised that both amlodipine/atorvastatin and potassium were called in. We went through med list to verify that it was updated.     Pt wife stated the refill for mupirocin was denied and would like to know if you would be willing to send in rx as pt keeps this for as needed purposes. Please advise. Thanks.

## 2023-07-21 RX ORDER — MUPIROCIN 20 MG/G
OINTMENT TOPICAL 3 TIMES DAILY
Qty: 22 G | Refills: 0 | Status: SHIPPED | OUTPATIENT
Start: 2023-07-21 | End: 2023-11-21 | Stop reason: SDUPTHER

## 2023-08-13 DIAGNOSIS — E78.2 MIXED DYSLIPIDEMIA: ICD-10-CM

## 2023-08-13 DIAGNOSIS — I10 ESSENTIAL HYPERTENSION: ICD-10-CM

## 2023-08-14 RX ORDER — AMLODIPINE BESYLATE AND ATORVASTATIN CALCIUM 5; 20 MG/1; MG/1
1 TABLET, FILM COATED ORAL DAILY
Qty: 90 TABLET | Refills: 2 | Status: SHIPPED | OUTPATIENT
Start: 2023-08-14

## 2023-09-11 ENCOUNTER — OFFICE VISIT (OUTPATIENT)
Dept: ALLERGY | Facility: CLINIC | Age: 63
End: 2023-09-11
Payer: COMMERCIAL

## 2023-09-11 VITALS — HEART RATE: 95 BPM | SYSTOLIC BLOOD PRESSURE: 128 MMHG | TEMPERATURE: 98 F | DIASTOLIC BLOOD PRESSURE: 80 MMHG

## 2023-09-11 DIAGNOSIS — F45.8 PRURITUS SINE MATERIA: Primary | ICD-10-CM

## 2023-09-11 DIAGNOSIS — R21 RASH: ICD-10-CM

## 2023-09-11 DIAGNOSIS — I48.91 ATRIAL FIBRILLATION, UNSPECIFIED TYPE: ICD-10-CM

## 2023-09-11 PROCEDURE — 99213 OFFICE O/P EST LOW 20 MIN: CPT | Mod: S$GLB,,, | Performed by: ALLERGY & IMMUNOLOGY

## 2023-09-11 PROCEDURE — 3074F PR MOST RECENT SYSTOLIC BLOOD PRESSURE < 130 MM HG: ICD-10-PCS | Mod: CPTII,S$GLB,, | Performed by: ALLERGY & IMMUNOLOGY

## 2023-09-11 PROCEDURE — 1159F MED LIST DOCD IN RCRD: CPT | Mod: CPTII,S$GLB,, | Performed by: ALLERGY & IMMUNOLOGY

## 2023-09-11 PROCEDURE — 1160F PR REVIEW ALL MEDS BY PRESCRIBER/CLIN PHARMACIST DOCUMENTED: ICD-10-PCS | Mod: CPTII,S$GLB,, | Performed by: ALLERGY & IMMUNOLOGY

## 2023-09-11 PROCEDURE — 1159F PR MEDICATION LIST DOCUMENTED IN MEDICAL RECORD: ICD-10-PCS | Mod: CPTII,S$GLB,, | Performed by: ALLERGY & IMMUNOLOGY

## 2023-09-11 PROCEDURE — 3074F SYST BP LT 130 MM HG: CPT | Mod: CPTII,S$GLB,, | Performed by: ALLERGY & IMMUNOLOGY

## 2023-09-11 PROCEDURE — 1160F RVW MEDS BY RX/DR IN RCRD: CPT | Mod: CPTII,S$GLB,, | Performed by: ALLERGY & IMMUNOLOGY

## 2023-09-11 PROCEDURE — 3079F PR MOST RECENT DIASTOLIC BLOOD PRESSURE 80-89 MM HG: ICD-10-PCS | Mod: CPTII,S$GLB,, | Performed by: ALLERGY & IMMUNOLOGY

## 2023-09-11 PROCEDURE — 3079F DIAST BP 80-89 MM HG: CPT | Mod: CPTII,S$GLB,, | Performed by: ALLERGY & IMMUNOLOGY

## 2023-09-11 PROCEDURE — 99213 PR OFFICE/OUTPT VISIT, EST, LEVL III, 20-29 MIN: ICD-10-PCS | Mod: S$GLB,,, | Performed by: ALLERGY & IMMUNOLOGY

## 2023-09-11 RX ORDER — FLUOXETINE HYDROCHLORIDE 20 MG/1
20 CAPSULE ORAL 2 TIMES DAILY
Qty: 60 CAPSULE | Refills: 3 | Status: SHIPPED | OUTPATIENT
Start: 2023-09-11 | End: 2024-09-10

## 2023-09-11 RX ORDER — CLOTRIMAZOLE AND BETAMETHASONE DIPROPIONATE 10; .64 MG/G; MG/G
CREAM TOPICAL
Qty: 45 G | Refills: 3 | Status: SHIPPED | OUTPATIENT
Start: 2023-09-11

## 2023-09-11 NOTE — PROGRESS NOTES
Subjective:       Patient ID: Howard Segura is a 62 y.o. male.    Chief Complaint: pruritus sine materia  (Patient is here for follow up for itching. Patient stopped cymbalta and start prozac 20 mg. Patient said its alittle better. )    HPI    Pt presents  For itching     Last visit   6/2023    Since that time,     Itching improved with prozac 20 mg BID. Pt states the itching is tremendously better.     Itching only improved with cymbalta 30 mg qhs - but didn't completely help.     He treid high doses of zyrtec and failed doxepin.     Pt states duration has been about a year.   He has tried elevated doses of zyrtec.   This wasn't with relief.   He did get urticaria associated as well.     He fell off his stairs and hit his ribs and broke his hip as well.   Itching in the inner arms   And outer legs    He mainly noticed this after his hip surgery.     Component      Latest Ref Rng & Units 1/20/2023   TSH      0.340 - 5.600 uIU/mL 2.050       Review of Systems    General: neg unexpected weight changes, fevers, chills, night sweats, malaise  HEENT: see hpi, Neg eye pain, vision changes, ear drainage, nose bleeds, throat tightness, sores in the mouth  CV: Neg chest pain, palpitations, swelling  Resp: see hpi, neg shortness of breath, hemoptysis, cough  GI: see hpi, neg dysphagia, night abdominal pain, reflux, chronic diarrhea, chronic constipation  Derm: See Hpi, neg new rash, neg flushing  Mu/sk: Neg joint pain, joint swelling   Psych: Neg anxiety  neuro: neg chronic headaches, muscle weakness  Endo: neg heat/cold intolerance, chronic fatigue    Objective:     Vitals:    09/11/23 1006   BP: 128/80   Pulse: 95   Temp: 97.9 °F (36.6 °C)          Physical Exam    General: no acute distress, well developed well nourished   Skin: Neg rashes or lesions    Assessment:       1. Pruritus sine materia    2. Atrial fibrillation, unspecified type    3. Rash            Plan:       Pruritus sine materia  -     FLUoxetine 20 MG  capsule; Take 1 capsule (20 mg total) by mouth 2 (two) times a day.  Dispense: 60 capsule; Refill: 3    Atrial fibrillation, unspecified type    Rash  -     clotrimazole-betamethasone 1-0.05% (LOTRISONE) cream; APPLY TOPICALLY TO THE AFFECTED AREA TWICE DAILY SPARINGLY  Dispense: 45 g; Refill: 3                    Pruritus sine materia  9/23:  Prozac 20 mg bid   Tried and failed cymbalta, doxepin, high doses of antihistamines.     6/23:  Improved with cymbalta   Discussed trial x 7 days and see if needs increase  If he doesn't he can stay at  lower dose     5/23:  Most likely neurogenic   Doxepin 10 mg qhs prn   Consider 20 mg   Consider amitrypitaline 25 mg     Did not tolerate gabapentin   Did not benefit from zytec 2 bid   Did not benefit from topical creams or therapies     Hives no longer once stopped creams.     Atrial fibrillation   9/23:  Continue eliquis  Non sedating medications best     5/23:  Continue eliquis  Discussed when on a sedation med to monitor for tripping hazards.       Follow up in 6-12 months, sooner if needed- denies any suicidal ideation, itching greatly improved.         Diana Ram M.D.  Allergy/Immunology  [facility]   [phone] phone  [phone] fax

## 2023-09-11 NOTE — LETTER
September 11, 2023        Bassam Lopez MD  [address]  [address]  [place] [address]             [facility]  [address]  [address]  [place] [address]  Phone: [phone]  Fax: [phone]   Patient: Howard Segura   MR Number: [id number]   YOB: 1960   Date of Visit: 9/11/2023       Dear Dr. Lopez:    Thank you for referring Howard Segura to me for evaluation. Below are the relevant portions of my assessment and plan of care.    1. Pruritus sine materia    2. Atrial fibrillation, unspecified type          Pruritus sine materia    Atrial fibrillation, unspecified type              If you have questions, please do not hesitate to call me. I look forward to following Howard along with you.    Sincerely,      Diana Ram MD           CC  No Recipients

## 2023-09-30 ENCOUNTER — PATIENT MESSAGE (OUTPATIENT)
Dept: ALLERGY | Facility: CLINIC | Age: 63
End: 2023-09-30

## 2023-10-02 DIAGNOSIS — F45.8 PRURITUS SINE MATERIA: Primary | ICD-10-CM

## 2023-10-02 RX ORDER — FLUOXETINE 10 MG/1
10 CAPSULE ORAL 2 TIMES DAILY
Qty: 60 CAPSULE | Refills: 3 | Status: SHIPPED | OUTPATIENT
Start: 2023-10-02 | End: 2024-10-01

## 2023-10-11 ENCOUNTER — PATIENT MESSAGE (OUTPATIENT)
Dept: FAMILY MEDICINE | Facility: CLINIC | Age: 63
End: 2023-10-11

## 2023-10-27 DIAGNOSIS — I48.0 PAROXYSMAL ATRIAL FIBRILLATION: ICD-10-CM

## 2023-10-29 DIAGNOSIS — J30.1 NON-SEASONAL ALLERGIC RHINITIS DUE TO POLLEN: ICD-10-CM

## 2023-10-30 RX ORDER — FLUTICASONE PROPIONATE 50 MCG
1 SPRAY, SUSPENSION (ML) NASAL DAILY
Qty: 16 G | Refills: 5 | Status: SHIPPED | OUTPATIENT
Start: 2023-10-30

## 2023-10-31 ENCOUNTER — PATIENT MESSAGE (OUTPATIENT)
Dept: FAMILY MEDICINE | Facility: CLINIC | Age: 63
End: 2023-10-31

## 2023-11-06 ENCOUNTER — HOSPITAL ENCOUNTER (OUTPATIENT)
Dept: RADIOLOGY | Facility: HOSPITAL | Age: 63
Discharge: HOME | End: 2023-11-06
Attending: PODIATRIST
Payer: COMMERCIAL

## 2023-11-06 ENCOUNTER — OFFICE VISIT (OUTPATIENT)
Dept: PODIATRY | Facility: CLINIC | Age: 63
End: 2023-11-06
Payer: COMMERCIAL

## 2023-11-06 DIAGNOSIS — M79.671 FOOT PAIN, RIGHT: ICD-10-CM

## 2023-11-06 DIAGNOSIS — M79.671 FOOT PAIN, RIGHT: Primary | ICD-10-CM

## 2023-11-06 DIAGNOSIS — S92.901A UNSPECIFIED FRACTURE OF RIGHT FOOT, INITIAL ENCOUNTER FOR CLOSED FRACTURE: Primary | ICD-10-CM

## 2023-11-06 DIAGNOSIS — S90.31XA CONTUSION OF RIGHT FOOT, INITIAL ENCOUNTER: ICD-10-CM

## 2023-11-06 PROCEDURE — 99204 PR OFFICE/OUTPT VISIT, NEW, LEVL IV, 45-59 MIN: ICD-10-PCS | Mod: S$GLB,,, | Performed by: PODIATRIST

## 2023-11-06 PROCEDURE — 73630 X-RAY EXAM OF FOOT: CPT | Mod: TC,PO,RT

## 2023-11-06 PROCEDURE — 73610 X-RAY EXAM OF ANKLE: CPT | Mod: TC,PO,RT

## 2023-11-06 PROCEDURE — 99999 PR PBB SHADOW E&M-EST. PATIENT-LVL II: ICD-10-PCS | Mod: PBBFAC,,, | Performed by: PODIATRIST

## 2023-11-06 PROCEDURE — 73630 XR FOOT COMPLETE 3 VIEW RIGHT: ICD-10-PCS | Mod: 26,RT,, | Performed by: RADIOLOGY

## 2023-11-06 PROCEDURE — 99204 OFFICE O/P NEW MOD 45 MIN: CPT | Mod: S$GLB,,, | Performed by: PODIATRIST

## 2023-11-06 PROCEDURE — 73610 XR ANKLE COMPLETE 3 VIEW RIGHT: ICD-10-PCS | Mod: 26,RT,, | Performed by: RADIOLOGY

## 2023-11-06 PROCEDURE — 73630 X-RAY EXAM OF FOOT: CPT | Mod: 26,RT,, | Performed by: RADIOLOGY

## 2023-11-06 PROCEDURE — 99999 PR PBB SHADOW E&M-EST. PATIENT-LVL II: CPT | Mod: PBBFAC,,, | Performed by: PODIATRIST

## 2023-11-06 PROCEDURE — 73610 X-RAY EXAM OF ANKLE: CPT | Mod: 26,RT,, | Performed by: RADIOLOGY

## 2023-11-07 ENCOUNTER — PATIENT MESSAGE (OUTPATIENT)
Dept: PODIATRY | Facility: CLINIC | Age: 63
End: 2023-11-07
Payer: COMMERCIAL

## 2023-11-07 ENCOUNTER — TELEPHONE (OUTPATIENT)
Dept: PODIATRY | Facility: CLINIC | Age: 63
End: 2023-11-07
Payer: COMMERCIAL

## 2023-11-07 RX ORDER — CYCLOBENZAPRINE HCL 10 MG
10 TABLET ORAL DAILY
Qty: 10 TABLET | Refills: 0 | Status: SHIPPED | OUTPATIENT
Start: 2023-11-07 | End: 2023-11-25

## 2023-11-07 NOTE — PROGRESS NOTES
Subjective:     Patient ID: Howard Segura is a 63 y.o. male.    Chief Complaint: No chief complaint on file.    Howard is a 63 y.o. male with a past medical history of A-fib, Acute vasomotor rhinitis (3/20/2019), Chronic maxillary sinusitis (3/20/2019), Closed fracture of neck of right femur (12/22/2019), Current smoker (11/8/2017), Duodenal ulcer, Fracture of femoral neck, right (12/22/2019), GERD (gastroesophageal reflux disease), Hay fever (11/8/2017), Hypertension, Hypokalemia (11/8/2017), Hypophosphatemia (12/23/2019), Inguinal hernia, Kidney stone, Neck pain, bilateral (5/8/2018), PAC (premature atrial contraction), Rash and nonspecific skin eruption (5/4/2020), Rhinitis medicamentosa (3/20/2019), Skin cancer, and Squamous cell carcinoma of skin. Patient relates injury to the Rt. Lateral forefoot/midfoot that occurred last Thursday.  Notes stubbing the foot against the leg post of a bed several times with ensuing swelling and pain to the extremity.  Notes he is barely able to apply weight bearing pressure to the area.  Rates pain currently as a 0/10 while at rest and a 10/10 with weight bearing.  He has been minimizing weight bearing activity and elevating.  Notes taking a flexeril, as this has helped minimize muscle spasms he has been encountering since the trauma.  Denies any additional pedal complaints.       Hemoglobin A1C   Date Value Ref Range Status   11/17/2022 5.7 4.5 - 6.2 % Final     Comment:     According to ADA guidelines, hemoglobin A1C <7.0% represents  optimal control in non-pregnant diabetic patients.  Different  metrics may apply to specific populations.   Standards of Medical Care in Diabetes - 2016.    For the purpose of screening for the presence of diabetes:  <5.7%     Consistent with the absence of diabetes  5.7-6.4%  Consistent with increasing risk for diabetes   (prediabetes)  >or=6.5%  Consistent with diabetes    Currently no consensus exists for use of hemoglobin A1C  for diagnosis of  diabetes for children.     05/04/2022 6.0 4.5 - 6.2 % Final     Comment:     According to ADA guidelines, hemoglobin A1C <7.0% represents  optimal control in non-pregnant diabetic patients.  Different  metrics may apply to specific populations.   Standards of Medical Care in Diabetes - 2016.    For the purpose of screening for the presence of diabetes:  <5.7%     Consistent with the absence of diabetes  5.7-6.4%  Consistent with increasing risk for diabetes   (prediabetes)  >or=6.5%  Consistent with diabetes    Currently no consensus exists for use of hemoglobin A1C  for diagnosis of diabetes for children.     12/22/2019 5.5 4.0 - 5.6 % Final     Comment:     ADA Screening Guidelines:  5.7-6.4%  Consistent with prediabetes  >or=6.5%  Consistent with diabetes  High levels of fetal hemoglobin interfere with the HbA1C  assay. Heterozygous hemoglobin variants (HbS, HgC, etc)do  not significantly interfere with this assay.   However, presence of multiple variants may affect accuracy.           Review of Systems   Constitutional: Negative for chills and fever.   Cardiovascular:  Negative for claudication.   Skin:  Negative for color change and nail changes.   Musculoskeletal:  Positive for joint pain, joint swelling, muscle cramps and myalgias. Negative for muscle weakness.   Gastrointestinal:  Negative for nausea and vomiting.   Neurological:  Negative for numbness and paresthesias.   Psychiatric/Behavioral:  Negative for altered mental status.         Objective:     Physical Exam  Constitutional:       Appearance: Normal appearance. He is not ill-appearing.   Cardiovascular:      Pulses:           Dorsalis pedis pulses are 2+ on the right side and 2+ on the left side.        Posterior tibial pulses are 2+ on the right side and 2+ on the left side.      Comments: CFT is < 3 seconds bilateral.  Pedal hair growth is present bilateral.  Moderate edema noted to the Rt. dorsal lateral midfoot and forefoot.  Toes are warm to  touch bilateral.    Musculoskeletal:         General: Swelling, tenderness and signs of injury present.      Comments: Muscle strength 5/5 in all muscle groups bilateral.  No tenderness nor crepitation with ROM of foot/ankle joints bilateral. Point tenderness with palpation to the Rt. 5th met had and along the diaphysis of said metatarsal.     Skin:     General: Skin is warm and dry.      Capillary Refill: Capillary refill takes 2 to 3 seconds.      Findings: Signs of injury present. No erythema, laceration, lesion, petechiae, rash or wound.      Comments: Increased pedal turgor, normal temperature, and normal texture noted to the Rt. Dorsal lateral midfoot/forefoot.  Toenails x 10 appear normotrophic. Examination of the skin reveals no evidence of significant maceration, rashes, open lesions, suspicious appearing nevi or other concerning lesions.       Neurological:      General: No focal deficit present.      Mental Status: He is alert.      Sensory: No sensory deficit.      Motor: No weakness or atrophy.      Comments: Light touch is intact bilateral.             Assessment:      Encounter Diagnoses   Name Primary?    Foot pain, right Yes    Contusion of right foot, initial encounter      Plan:     Diagnoses and all orders for this visit:    Foot pain, right  -     X-Ray Foot Complete Right; Future  -     X-Ray Ankle Complete Right; Future  -     cyclobenzaprine (FLEXERIL) 10 MG tablet; Take 1 tablet (10 mg total) by mouth once daily. for 10 days    Contusion of right foot, initial encounter  -     cyclobenzaprine (FLEXERIL) 10 MG tablet; Take 1 tablet (10 mg total) by mouth once daily. for 10 days      I counseled the patient on his conditions, their implications and medical management.    Orders written for an x-ray of the Rt. Foot and ankle.  Personally evaluated said plain films with no obvious sign of fracture noted.    Fitted and dispensed a CAM walker.  This is to be utilized with a knee roller to minimize  pressure to the affected limb.    Patient was fitted with a tubigrip stocking.    Advised to continue with RICE.    Rx written for flexeril.      RTC in 2 weeks for follow up.    Christiano Isaacs DPM

## 2023-11-07 NOTE — TELEPHONE ENCOUNTER
----- Message from Christiano Isaacs DPM sent at 11/7/2023  5:18 AM CST -----  Please let the patient know his x-ray was negative for signs of trauma.  I'm still waiting on the radiologist's report to see if he/she noted anything as well.  As discussed, this is likely a really bad contusion of the underlying bone.  We will keep him in the boot for the next two weeks and then re-evaluate back in clinic.  Please set him up a follow up appointment.

## 2023-11-07 NOTE — TELEPHONE ENCOUNTER
Spoke with patient and he voiced understanding regarding XR results and boot wear. Scheduled follow up appointment

## 2023-11-08 ENCOUNTER — TELEPHONE (OUTPATIENT)
Dept: PODIATRY | Facility: CLINIC | Age: 63
End: 2023-11-08
Payer: COMMERCIAL

## 2023-11-08 NOTE — TELEPHONE ENCOUNTER
Called and scheduled f/u.     ----- Message from Christiano Isaacs DPM sent at 11/8/2023  5:49 AM CST -----  Please let the patient know the radiologist saw a possible injury to the cartilage along the lateral most portion of the ankle, which could have occurred with scraping that part of the ankle against the leg post.  However, this was not overtly painful on exam and is likely an incidental finding.  Let's get him back into the clinic in two weeks for a follow up.  ----- Message -----  From: Interface, Rad Results In  Sent: 11/7/2023   8:33 AM CST  To: Christiano Isaacs DPM

## 2023-11-13 ENCOUNTER — LAB VISIT (OUTPATIENT)
Dept: LAB | Facility: HOSPITAL | Age: 63
End: 2023-11-13
Attending: INTERNAL MEDICINE
Payer: COMMERCIAL

## 2023-11-13 DIAGNOSIS — I48.19 PERSISTENT ATRIAL FIBRILLATION: Chronic | ICD-10-CM

## 2023-11-13 DIAGNOSIS — Z79.01 ANTICOAGULATED: Chronic | ICD-10-CM

## 2023-11-13 DIAGNOSIS — I10 ESSENTIAL HYPERTENSION: Chronic | ICD-10-CM

## 2023-11-13 DIAGNOSIS — Z12.5 SCREENING FOR PROSTATE CANCER: ICD-10-CM

## 2023-11-13 DIAGNOSIS — E78.2 MIXED DYSLIPIDEMIA: Chronic | ICD-10-CM

## 2023-11-13 LAB
ALBUMIN SERPL BCP-MCNC: 4 G/DL (ref 3.5–5.2)
ALBUMIN/CREAT UR: NORMAL UG/MG (ref 0–30)
ALP SERPL-CCNC: 74 U/L (ref 55–135)
ALT SERPL W/O P-5'-P-CCNC: 16 U/L (ref 10–44)
ANION GAP SERPL CALC-SCNC: 5 MMOL/L (ref 8–16)
AST SERPL-CCNC: 15 U/L (ref 10–40)
BILIRUB SERPL-MCNC: 0.6 MG/DL (ref 0.1–1)
BUN SERPL-MCNC: 13 MG/DL (ref 8–23)
CALCIUM SERPL-MCNC: 9.4 MG/DL (ref 8.7–10.5)
CHLORIDE SERPL-SCNC: 101 MMOL/L (ref 95–110)
CHOLEST SERPL-MCNC: 140 MG/DL (ref 120–199)
CHOLEST/HDLC SERPL: 4.2 {RATIO} (ref 2–5)
CO2 SERPL-SCNC: 32 MMOL/L (ref 23–29)
COMPLEXED PSA SERPL-MCNC: 2.14 NG/ML (ref 0–4)
CREAT SERPL-MCNC: 1 MG/DL (ref 0.5–1.4)
CREAT UR-MCNC: 139.1 MG/DL (ref 23–375)
ERYTHROCYTE [DISTWIDTH] IN BLOOD BY AUTOMATED COUNT: 13.2 % (ref 11.5–14.5)
EST. GFR  (NO RACE VARIABLE): >60 ML/MIN/1.73 M^2
GLUCOSE SERPL-MCNC: 114 MG/DL (ref 70–110)
HCT VFR BLD AUTO: 39.7 % (ref 40–54)
HDLC SERPL-MCNC: 33 MG/DL (ref 40–75)
HDLC SERPL: 23.6 % (ref 20–50)
HGB BLD-MCNC: 13.8 G/DL (ref 14–18)
LDLC SERPL CALC-MCNC: 77 MG/DL (ref 63–159)
MCH RBC QN AUTO: 30.3 PG (ref 27–31)
MCHC RBC AUTO-ENTMCNC: 34.8 G/DL (ref 32–36)
MCV RBC AUTO: 87 FL (ref 82–98)
MICROALBUMIN UR DL<=1MG/L-MCNC: <7 UG/ML
NONHDLC SERPL-MCNC: 107 MG/DL
PLATELET # BLD AUTO: 254 K/UL (ref 150–450)
PMV BLD AUTO: 9.1 FL (ref 9.2–12.9)
POTASSIUM SERPL-SCNC: 4.2 MMOL/L (ref 3.5–5.1)
PROT SERPL-MCNC: 6.6 G/DL (ref 6–8.4)
RBC # BLD AUTO: 4.56 M/UL (ref 4.6–6.2)
SODIUM SERPL-SCNC: 138 MMOL/L (ref 136–145)
TRIGL SERPL-MCNC: 150 MG/DL (ref 30–150)
WBC # BLD AUTO: 6.29 K/UL (ref 3.9–12.7)

## 2023-11-13 PROCEDURE — 80061 LIPID PANEL: CPT | Performed by: INTERNAL MEDICINE

## 2023-11-13 PROCEDURE — 36415 COLL VENOUS BLD VENIPUNCTURE: CPT | Performed by: INTERNAL MEDICINE

## 2023-11-13 PROCEDURE — 80053 COMPREHEN METABOLIC PANEL: CPT | Performed by: INTERNAL MEDICINE

## 2023-11-13 PROCEDURE — 82570 ASSAY OF URINE CREATININE: CPT | Performed by: INTERNAL MEDICINE

## 2023-11-13 PROCEDURE — 85027 COMPLETE CBC AUTOMATED: CPT | Performed by: INTERNAL MEDICINE

## 2023-11-13 PROCEDURE — 84153 ASSAY OF PSA TOTAL: CPT | Performed by: INTERNAL MEDICINE

## 2023-11-20 ENCOUNTER — OFFICE VISIT (OUTPATIENT)
Dept: PODIATRY | Facility: CLINIC | Age: 63
End: 2023-11-20
Payer: COMMERCIAL

## 2023-11-20 VITALS — HEIGHT: 72 IN | WEIGHT: 228 LBS | BODY MASS INDEX: 30.88 KG/M2

## 2023-11-20 DIAGNOSIS — S90.31XD CONTUSION OF RIGHT FOOT, SUBSEQUENT ENCOUNTER: Primary | ICD-10-CM

## 2023-11-20 PROCEDURE — 99999 PR PBB SHADOW E&M-EST. PATIENT-LVL III: CPT | Mod: PBBFAC,,, | Performed by: PODIATRIST

## 2023-11-20 PROCEDURE — 99212 OFFICE O/P EST SF 10 MIN: CPT | Mod: S$GLB,,, | Performed by: PODIATRIST

## 2023-11-20 PROCEDURE — 99212 PR OFFICE/OUTPT VISIT, EST, LEVL II, 10-19 MIN: ICD-10-PCS | Mod: S$GLB,,, | Performed by: PODIATRIST

## 2023-11-20 PROCEDURE — 3066F NEPHROPATHY DOC TX: CPT | Mod: CPTII,S$GLB,, | Performed by: PODIATRIST

## 2023-11-20 PROCEDURE — 3066F PR DOCUMENTATION OF TREATMENT FOR NEPHROPATHY: ICD-10-PCS | Mod: CPTII,S$GLB,, | Performed by: PODIATRIST

## 2023-11-20 PROCEDURE — 3061F PR NEG MICROALBUMINURIA RESULT DOCUMENTED/REVIEW: ICD-10-PCS | Mod: CPTII,S$GLB,, | Performed by: PODIATRIST

## 2023-11-20 PROCEDURE — 1159F PR MEDICATION LIST DOCUMENTED IN MEDICAL RECORD: ICD-10-PCS | Mod: CPTII,S$GLB,, | Performed by: PODIATRIST

## 2023-11-20 PROCEDURE — 99999 PR PBB SHADOW E&M-EST. PATIENT-LVL III: ICD-10-PCS | Mod: PBBFAC,,, | Performed by: PODIATRIST

## 2023-11-20 PROCEDURE — 3008F BODY MASS INDEX DOCD: CPT | Mod: CPTII,S$GLB,, | Performed by: PODIATRIST

## 2023-11-20 PROCEDURE — 1159F MED LIST DOCD IN RCRD: CPT | Mod: CPTII,S$GLB,, | Performed by: PODIATRIST

## 2023-11-20 PROCEDURE — 3061F NEG MICROALBUMINURIA REV: CPT | Mod: CPTII,S$GLB,, | Performed by: PODIATRIST

## 2023-11-20 PROCEDURE — 3008F PR BODY MASS INDEX (BMI) DOCUMENTED: ICD-10-PCS | Mod: CPTII,S$GLB,, | Performed by: PODIATRIST

## 2023-11-20 NOTE — PROGRESS NOTES
Subjective:     Patient ID: Howard Sgeura is a 63 y.o. male.    Chief Complaint: Follow-up (Right foot )  Patient presents to clinic for a two week follow up regarding a contusion of the Rt. Forefoot.  He currently denies pain with weight bearing to the limb.  Has been utilizing the CAM walker as instructed.  Relates continued swelling of the limb, however, notes this has improved with use of a compression sleeve.   Denies any additional pedal complaints.       Hemoglobin A1C   Date Value Ref Range Status   11/17/2022 5.7 4.5 - 6.2 % Final     Comment:     According to ADA guidelines, hemoglobin A1C <7.0% represents  optimal control in non-pregnant diabetic patients.  Different  metrics may apply to specific populations.   Standards of Medical Care in Diabetes - 2016.    For the purpose of screening for the presence of diabetes:  <5.7%     Consistent with the absence of diabetes  5.7-6.4%  Consistent with increasing risk for diabetes   (prediabetes)  >or=6.5%  Consistent with diabetes    Currently no consensus exists for use of hemoglobin A1C  for diagnosis of diabetes for children.     05/04/2022 6.0 4.5 - 6.2 % Final     Comment:     According to ADA guidelines, hemoglobin A1C <7.0% represents  optimal control in non-pregnant diabetic patients.  Different  metrics may apply to specific populations.   Standards of Medical Care in Diabetes - 2016.    For the purpose of screening for the presence of diabetes:  <5.7%     Consistent with the absence of diabetes  5.7-6.4%  Consistent with increasing risk for diabetes   (prediabetes)  >or=6.5%  Consistent with diabetes    Currently no consensus exists for use of hemoglobin A1C  for diagnosis of diabetes for children.     12/22/2019 5.5 4.0 - 5.6 % Final     Comment:     ADA Screening Guidelines:  5.7-6.4%  Consistent with prediabetes  >or=6.5%  Consistent with diabetes  High levels of fetal hemoglobin interfere with the HbA1C  assay. Heterozygous hemoglobin variants  (HbS, HgC, etc)do  not significantly interfere with this assay.   However, presence of multiple variants may affect accuracy.           Review of Systems   Constitutional: Negative for chills and fever.   Cardiovascular:  Negative for claudication.   Skin:  Negative for color change and nail changes.   Musculoskeletal:  Positive for joint pain, joint swelling, muscle cramps and myalgias. Negative for muscle weakness.   Gastrointestinal:  Negative for nausea and vomiting.   Neurological:  Negative for numbness and paresthesias.   Psychiatric/Behavioral:  Negative for altered mental status.         Objective:     Physical Exam  Constitutional:       Appearance: Normal appearance. He is not ill-appearing.   Cardiovascular:      Pulses:           Dorsalis pedis pulses are 2+ on the right side and 2+ on the left side.        Posterior tibial pulses are 2+ on the right side and 2+ on the left side.      Comments: CFT is < 3 seconds bilateral.  Pedal hair growth is present bilateral.  Mild edema noted to the Rt. dorsal lateral forefoot.  Toes are warm to touch bilateral.    Musculoskeletal:         General: Swelling, tenderness and signs of injury present.      Comments: Muscle strength 5/5 in all muscle groups bilateral.  No tenderness nor crepitation with ROM of foot/ankle joints bilateral.  Less point tenderness with palpation to the Rt. 5th met head.  (-) for pain with palpation to the Rt. Lateral gutter of the ankle.   Skin:     General: Skin is warm and dry.      Capillary Refill: Capillary refill takes 2 to 3 seconds.      Findings: Signs of injury present. No erythema, laceration, lesion, petechiae, rash or wound.      Comments: Increased pedal turgor, normal temperature, and normal texture noted to the Rt. Dorsal lateral forefoot.  Toenails x 10 appear normotrophic. Examination of the skin reveals no evidence of significant maceration, rashes, open lesions, suspicious appearing nevi or other concerning lesions.        Neurological:      General: No focal deficit present.      Mental Status: He is alert.      Sensory: No sensory deficit.      Motor: No weakness or atrophy.      Comments: Light touch is intact bilateral.             Assessment:      Encounter Diagnosis   Name Primary?    Contusion of right foot, subsequent encounter Yes     Plan:     Howard was seen today for follow-up.    Diagnoses and all orders for this visit:    Contusion of right foot, subsequent encounter      I counseled the patient on his conditions, their implications and medical management.    Overall, considerable improvement in foot pain since the last exam.      Patient to continue use of the compression stocking to minimize peripheral edema to the limb.    May continue to ice the affected area for the next two weeks.    Patient to transition back into shoe gear with a rigid midsole for the next two weeks.  May then utilize all other shoe gear going forward.    Patient to gradually increase his weight bearing activity over the next month.  Would refrain from high impact activity during this time.    Due to the contusion, explained that it can take up to 6 months for LE swelling to diminish.     RTC prn.    Christiano Isaacs DPM

## 2023-11-21 RX ORDER — MUPIROCIN 20 MG/G
OINTMENT TOPICAL 3 TIMES DAILY
Qty: 22 G | Refills: 0 | Status: SHIPPED | OUTPATIENT
Start: 2023-11-21
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
## 2023-11-27 ENCOUNTER — OFFICE VISIT (OUTPATIENT)
Dept: FAMILY MEDICINE | Facility: CLINIC | Age: 63
End: 2023-11-27
Payer: COMMERCIAL

## 2023-11-27 VITALS
BODY MASS INDEX: 29.26 KG/M2 | DIASTOLIC BLOOD PRESSURE: 87 MMHG | HEART RATE: 60 BPM | HEIGHT: 72 IN | WEIGHT: 216 LBS | SYSTOLIC BLOOD PRESSURE: 136 MMHG

## 2023-11-27 DIAGNOSIS — Z23 NEED FOR VACCINATION: ICD-10-CM

## 2023-11-27 DIAGNOSIS — R79.0 LOW MAGNESIUM LEVEL: ICD-10-CM

## 2023-11-27 DIAGNOSIS — I10 ESSENTIAL HYPERTENSION: Chronic | ICD-10-CM

## 2023-11-27 DIAGNOSIS — Z79.01 ANTICOAGULATED: Chronic | ICD-10-CM

## 2023-11-27 DIAGNOSIS — Z12.11 SCREENING FOR COLON CANCER: ICD-10-CM

## 2023-11-27 DIAGNOSIS — I48.19 PERSISTENT ATRIAL FIBRILLATION: Primary | Chronic | ICD-10-CM

## 2023-11-27 DIAGNOSIS — E78.2 MIXED DYSLIPIDEMIA: ICD-10-CM

## 2023-11-27 PROCEDURE — 99214 PR OFFICE/OUTPT VISIT, EST, LEVL IV, 30-39 MIN: ICD-10-PCS | Mod: 25,S$GLB,, | Performed by: INTERNAL MEDICINE

## 2023-11-27 PROCEDURE — 99214 OFFICE O/P EST MOD 30 MIN: CPT | Mod: 25,S$GLB,, | Performed by: INTERNAL MEDICINE

## 2023-11-27 PROCEDURE — 3066F PR DOCUMENTATION OF TREATMENT FOR NEPHROPATHY: ICD-10-PCS | Mod: CPTII,S$GLB,, | Performed by: INTERNAL MEDICINE

## 2023-11-27 PROCEDURE — 3008F BODY MASS INDEX DOCD: CPT | Mod: CPTII,S$GLB,, | Performed by: INTERNAL MEDICINE

## 2023-11-27 PROCEDURE — 1159F MED LIST DOCD IN RCRD: CPT | Mod: CPTII,S$GLB,, | Performed by: INTERNAL MEDICINE

## 2023-11-27 PROCEDURE — 3061F NEG MICROALBUMINURIA REV: CPT | Mod: CPTII,S$GLB,, | Performed by: INTERNAL MEDICINE

## 2023-11-27 PROCEDURE — 1159F PR MEDICATION LIST DOCUMENTED IN MEDICAL RECORD: ICD-10-PCS | Mod: CPTII,S$GLB,, | Performed by: INTERNAL MEDICINE

## 2023-11-27 PROCEDURE — 3075F SYST BP GE 130 - 139MM HG: CPT | Mod: CPTII,S$GLB,, | Performed by: INTERNAL MEDICINE

## 2023-11-27 PROCEDURE — 90471 FLU VACCINE (QUAD) GREATER THAN OR EQUAL TO 3YO PRESERVATIVE FREE IM: ICD-10-PCS | Mod: S$GLB,,, | Performed by: INTERNAL MEDICINE

## 2023-11-27 PROCEDURE — 3079F PR MOST RECENT DIASTOLIC BLOOD PRESSURE 80-89 MM HG: ICD-10-PCS | Mod: CPTII,S$GLB,, | Performed by: INTERNAL MEDICINE

## 2023-11-27 PROCEDURE — 90686 FLU VACCINE (QUAD) GREATER THAN OR EQUAL TO 3YO PRESERVATIVE FREE IM: ICD-10-PCS | Mod: S$GLB,,, | Performed by: INTERNAL MEDICINE

## 2023-11-27 PROCEDURE — 90686 IIV4 VACC NO PRSV 0.5 ML IM: CPT | Mod: S$GLB,,, | Performed by: INTERNAL MEDICINE

## 2023-11-27 PROCEDURE — 1160F RVW MEDS BY RX/DR IN RCRD: CPT | Mod: CPTII,S$GLB,, | Performed by: INTERNAL MEDICINE

## 2023-11-27 PROCEDURE — 3079F DIAST BP 80-89 MM HG: CPT | Mod: CPTII,S$GLB,, | Performed by: INTERNAL MEDICINE

## 2023-11-27 PROCEDURE — 3008F PR BODY MASS INDEX (BMI) DOCUMENTED: ICD-10-PCS | Mod: CPTII,S$GLB,, | Performed by: INTERNAL MEDICINE

## 2023-11-27 PROCEDURE — 3075F PR MOST RECENT SYSTOLIC BLOOD PRESS GE 130-139MM HG: ICD-10-PCS | Mod: CPTII,S$GLB,, | Performed by: INTERNAL MEDICINE

## 2023-11-27 PROCEDURE — 3061F PR NEG MICROALBUMINURIA RESULT DOCUMENTED/REVIEW: ICD-10-PCS | Mod: CPTII,S$GLB,, | Performed by: INTERNAL MEDICINE

## 2023-11-27 PROCEDURE — 3066F NEPHROPATHY DOC TX: CPT | Mod: CPTII,S$GLB,, | Performed by: INTERNAL MEDICINE

## 2023-11-27 PROCEDURE — 1160F PR REVIEW ALL MEDS BY PRESCRIBER/CLIN PHARMACIST DOCUMENTED: ICD-10-PCS | Mod: CPTII,S$GLB,, | Performed by: INTERNAL MEDICINE

## 2023-11-27 PROCEDURE — 90471 IMMUNIZATION ADMIN: CPT | Mod: S$GLB,,, | Performed by: INTERNAL MEDICINE

## 2023-12-06 ENCOUNTER — PATIENT OUTREACH (OUTPATIENT)
Dept: ADMINISTRATIVE | Facility: HOSPITAL | Age: 63
End: 2023-12-06
Payer: COMMERCIAL

## 2023-12-06 NOTE — PROGRESS NOTES
Population Health Chart Review & Patient Outreach Details    Outreach Performed: NO    Additional Pop Health Notes:           Updates Requested / Reviewed:      Updated Care Coordination Note, Care Everywhere, , Care Team Updated, and Immunizations Reconciliation Completed or Queried: [place]         Health Maintenance Topics Overdue:    Health Maintenance Due   Topic Date Due    Shingles Vaccine (1 of 2) Never done    RSV Vaccine (Age 60+ and Pregnant patients) (1 - 1-dose 60+ series) Never done    Colorectal Cancer Screening  09/28/2022    COVID-19 Vaccine (8 - 2023-24 season) 09/01/2023         Health Maintenance Topic(s) Outreach Outcomes & Actions Taken:    Colorectal Cancer Screening - Outreach Outcomes & Actions Taken  : Pt Will Schedule with External Provider / Order Routed & Care Team Updated if Applicable

## 2023-12-29 DIAGNOSIS — R79.0 LOW MAGNESIUM LEVEL: ICD-10-CM

## 2024-01-02 RX ORDER — LANOLIN ALCOHOL/MO/W.PET/CERES
400 CREAM (GRAM) TOPICAL DAILY
Qty: 90 TABLET | Refills: 3 | Status: SHIPPED | OUTPATIENT
Start: 2024-01-02

## 2024-01-02 RX ORDER — POTASSIUM CHLORIDE 750 MG/1
10 CAPSULE, EXTENDED RELEASE ORAL 2 TIMES DAILY
Qty: 180 CAPSULE | Refills: 1 | Status: SHIPPED | OUTPATIENT
Start: 2024-01-02

## 2024-02-01 LAB — CRC RECOMMENDATION EXT: NORMAL

## 2024-03-03 DIAGNOSIS — I48.19 PERSISTENT ATRIAL FIBRILLATION: Chronic | ICD-10-CM

## 2024-03-03 DIAGNOSIS — I10 ESSENTIAL HYPERTENSION: Chronic | ICD-10-CM

## 2024-03-04 RX ORDER — METOPROLOL TARTRATE 100 MG/1
100 TABLET ORAL 2 TIMES DAILY
Qty: 180 TABLET | Refills: 3 | Status: SHIPPED | OUTPATIENT
Start: 2024-03-04

## 2024-03-04 RX ORDER — TRIAMTERENE/HYDROCHLOROTHIAZID 37.5-25 MG
1 TABLET ORAL DAILY
Qty: 90 TABLET | Refills: 3 | Status: SHIPPED | OUTPATIENT
Start: 2024-03-04

## 2024-03-12 ENCOUNTER — PATIENT MESSAGE (OUTPATIENT)
Dept: ADMINISTRATIVE | Facility: HOSPITAL | Age: 64
End: 2024-03-12
Payer: COMMERCIAL

## 2024-03-26 ENCOUNTER — PATIENT OUTREACH (OUTPATIENT)
Dept: ADMINISTRATIVE | Facility: HOSPITAL | Age: 64
End: 2024-03-26
Payer: COMMERCIAL

## 2024-03-26 PROBLEM — Z98.890 HISTORY OF COLONOSCOPY: Status: ACTIVE | Noted: 2024-03-26

## 2024-03-26 NOTE — PROGRESS NOTES
Population Health Chart Review & Patient Outreach Details      Additional [facility] Health Notes:               Updates Requested / Reviewed:      Updated Care Coordination Note         Health Maintenance Topics Overdue:      VBHM Score: 0     Patient is not due for any topics at this time.    Shingles/Zoster Vaccine  RSV Vaccine                  Health Maintenance Topic(s) Outreach Outcomes & Actions Taken:    Colorectal Cancer Screening - Outreach Outcomes & Actions Taken  : External Records Uploaded, Care Team Updated, & History Updated if Applicable

## 2024-05-11 DIAGNOSIS — I48.0 PAROXYSMAL ATRIAL FIBRILLATION: ICD-10-CM

## 2024-05-11 DIAGNOSIS — E78.2 MIXED DYSLIPIDEMIA: ICD-10-CM

## 2024-05-11 DIAGNOSIS — I10 ESSENTIAL HYPERTENSION: ICD-10-CM

## 2024-05-13 DIAGNOSIS — F45.8 PRURITUS SINE MATERIA: ICD-10-CM

## 2024-05-13 RX ORDER — MUPIROCIN 20 MG/G
OINTMENT TOPICAL 3 TIMES DAILY
Qty: 22 G | Refills: 0 | Status: SHIPPED | OUTPATIENT
Start: 2024-05-13

## 2024-05-13 RX ORDER — AMLODIPINE BESYLATE AND ATORVASTATIN CALCIUM 5; 20 MG/1; MG/1
1 TABLET, FILM COATED ORAL DAILY
Qty: 90 TABLET | Refills: 2 | Status: SHIPPED | OUTPATIENT
Start: 2024-05-13

## 2024-05-13 RX ORDER — POTASSIUM CHLORIDE 750 MG/1
10 CAPSULE, EXTENDED RELEASE ORAL 2 TIMES DAILY
Qty: 180 CAPSULE | Refills: 1 | Status: SHIPPED | OUTPATIENT
Start: 2024-05-13

## 2024-05-14 RX ORDER — FLUOXETINE 10 MG/1
10 CAPSULE ORAL 2 TIMES DAILY
Qty: 60 CAPSULE | Refills: 3 | OUTPATIENT
Start: 2024-05-14 | End: 2025-05-14

## 2024-05-14 RX ORDER — FLUOXETINE HYDROCHLORIDE 20 MG/1
20 CAPSULE ORAL 2 TIMES DAILY
Qty: 60 CAPSULE | Refills: 3 | OUTPATIENT
Start: 2024-05-14 | End: 2025-05-14

## 2024-05-28 RX ORDER — L-MEFOL/A-CYST/MEB12/ALGAL OIL 6-600-2 MG
TABLET ORAL
Qty: 90 TABLET | Refills: 3 | Status: SHIPPED | OUTPATIENT
Start: 2024-05-28

## 2024-06-04 ENCOUNTER — PATIENT MESSAGE (OUTPATIENT)
Dept: FAMILY MEDICINE | Facility: CLINIC | Age: 64
End: 2024-06-04
Payer: COMMERCIAL

## 2024-06-04 ENCOUNTER — TELEPHONE (OUTPATIENT)
Dept: FAMILY MEDICINE | Facility: CLINIC | Age: 64
End: 2024-06-04
Payer: COMMERCIAL

## 2024-06-04 DIAGNOSIS — I10 ESSENTIAL HYPERTENSION: Primary | ICD-10-CM

## 2024-06-04 NOTE — TELEPHONE ENCOUNTER
Dr Ram office called,   pt is allergic to Amlodipine/atorvastatin  and she wants you to change med

## 2024-06-05 RX ORDER — AMLODIPINE BESYLATE 5 MG/1
5 TABLET ORAL DAILY
Qty: 30 TABLET | Refills: 3 | Status: SHIPPED | OUTPATIENT
Start: 2024-06-05 | End: 2025-06-05

## 2024-06-05 NOTE — TELEPHONE ENCOUNTER
I have sent amlodipine at 5 mg which was a component of your blood pressures/cholesterol medication to your local pharmacy at [facility].  In [place]  Let us see how you do on this by itself before I send it to mail order pharmacy.    Dr. Jessica BARBER             ===View-only below this line===      ----- Message -----       From:Howard Segura       Sent:6/4/2024  4:08 PM CDT         To:User Message Message List    Subject:Allergy to medication    Dr Richter told me to stop the atorvastatin & only take amlodipine for 2 wks to see if that helps.   Dr Ram also told me to stop Prilosec & change to Pepcid.     I have an appt w/ you next week.     Howard Mary      ----- Message -----       From:Howard Segura       Sent:6/4/2024  2:10 PM CDT         To:User Message Message List    Subject:Allergy to medication    No sir. All i know is they did a punch biopsy and it shows that there is a drug reaction causing my itching. No one has any idea which medicine however in her research amlodipine was a leaser in causing itch.       ----- Message -----       From:Bassam Lopez       Sent:6/4/2024  2:05 PM CDT         To:Howard Segura    Subject:Allergy to medication    Billie Lawrence:-I got a message from Dr. Diana Ram MD immunology and Allergy that your allergic to amlodipine atorvastatin.  These are 2 medications which include the blood pressure amlodipine and atorvastatin the cholesterol component.  Would you know or did Dr. Ram clarify to you as to which component you might be allergic to.    Dr. Jessica BARBER

## 2024-06-06 ENCOUNTER — PATIENT MESSAGE (OUTPATIENT)
Dept: FAMILY MEDICINE | Facility: CLINIC | Age: 64
End: 2024-06-06
Payer: COMMERCIAL

## 2024-06-13 ENCOUNTER — PATIENT MESSAGE (OUTPATIENT)
Dept: FAMILY MEDICINE | Facility: CLINIC | Age: 64
End: 2024-06-13

## 2024-06-13 ENCOUNTER — OFFICE VISIT (OUTPATIENT)
Dept: FAMILY MEDICINE | Facility: CLINIC | Age: 64
End: 2024-06-13
Payer: COMMERCIAL

## 2024-06-13 VITALS
DIASTOLIC BLOOD PRESSURE: 86 MMHG | SYSTOLIC BLOOD PRESSURE: 115 MMHG | HEART RATE: 70 BPM | HEIGHT: 72 IN | WEIGHT: 223 LBS | BODY MASS INDEX: 30.2 KG/M2

## 2024-06-13 DIAGNOSIS — E78.2 MIXED DYSLIPIDEMIA: ICD-10-CM

## 2024-06-13 DIAGNOSIS — I10 ESSENTIAL HYPERTENSION: ICD-10-CM

## 2024-06-13 DIAGNOSIS — L29.9 ITCHING: Primary | ICD-10-CM

## 2024-06-13 DIAGNOSIS — K40.90 UNILATERAL INGUINAL HERNIA WITHOUT OBSTRUCTION OR GANGRENE, RECURRENCE NOT SPECIFIED: ICD-10-CM

## 2024-06-13 DIAGNOSIS — Z79.01 ANTICOAGULATED: ICD-10-CM

## 2024-06-13 DIAGNOSIS — I48.19 PERSISTENT ATRIAL FIBRILLATION: ICD-10-CM

## 2024-06-13 DIAGNOSIS — Z12.5 SCREENING FOR PROSTATE CANCER: ICD-10-CM

## 2024-06-13 DIAGNOSIS — R79.0 LOW MAGNESIUM LEVEL: ICD-10-CM

## 2024-06-13 PROCEDURE — 99214 OFFICE O/P EST MOD 30 MIN: CPT | Mod: S$GLB,,, | Performed by: INTERNAL MEDICINE

## 2024-06-13 PROCEDURE — 1160F RVW MEDS BY RX/DR IN RCRD: CPT | Mod: CPTII,S$GLB,, | Performed by: INTERNAL MEDICINE

## 2024-06-13 PROCEDURE — 3074F SYST BP LT 130 MM HG: CPT | Mod: CPTII,S$GLB,, | Performed by: INTERNAL MEDICINE

## 2024-06-13 PROCEDURE — 3079F DIAST BP 80-89 MM HG: CPT | Mod: CPTII,S$GLB,, | Performed by: INTERNAL MEDICINE

## 2024-06-13 PROCEDURE — 1159F MED LIST DOCD IN RCRD: CPT | Mod: CPTII,S$GLB,, | Performed by: INTERNAL MEDICINE

## 2024-06-13 PROCEDURE — 3008F BODY MASS INDEX DOCD: CPT | Mod: CPTII,S$GLB,, | Performed by: INTERNAL MEDICINE

## 2024-06-13 PROCEDURE — 99999 PR PBB SHADOW E&M-EST. PATIENT-LVL III: CPT | Mod: PBBFAC,,, | Performed by: INTERNAL MEDICINE

## 2024-06-13 NOTE — PROGRESS NOTES
Subjective:       Patient ID: Howard Segura is a 63 y.o. male.    Chief Complaint: Hyperlipidemia, Hypertension, Itching, Hernia, and Atrial Fibrillation    Mr. Howard Segura is a 63-year-old gentleman who comes for six-month follow-up.      Underlying medical issues are as below:-.    1.-atrial fibrillation currently on Eliquis.  Also on metoprolol  2.-hypertension currently on amlodipine as a part of amlodipine atorvastatin combination.  Also on metoprolol and Dyazide.  3.-hyperlipidemia currently on amlodipine atorvastatin combination  4.-omeprazole for occasional reflux symptoms.  5.-fluoxetine for itching.  Please note that this is not for depression or anxiety.  6.-intentional weight loss of about 18 lb noted last visit after watching his diet and focusing on exercise.  Not on GLP 1        Developments include an itching which is unexplained.  He does have a dry skin.  Evidently he had a biopsy and he was told that he had a photosensitive skin and perhaps 1 of the medications with amlodipine and atorvastatin combination maybe causing that.  We stopped that combination especially the atorvastatin part and continued him on amlodipine.  It has been about 10 days or so when we communicated about that but it does not bought any relief in his itching.    Another issue is he has developed a small hernia and he asks me the options for it.  My option is that probably he might need a surgery and it may involve the mesh which might have its own complication but he should discuss that with the general surgeon concerned.  I did discuss about the general surgeon who performed the surgery in this town.  Has a active lifestyle and the other day he bicycle 62 miles when he had traveled to [place].  Recently had a echocardiogram which was normal.  I do feel that perhaps if not now, down the road he may need a surgery to fix this to help him maintain his active lifestyle.    Recent updates include a virtual cardiology consultation  where he was found to have a elevated coronary calcium score and was advised to continue with aggressive cardiac risk modification factors including exercise and diet.        Coming back to the itching, he agrees to try a 5 day stopping protocol for each medication at a time.    Not the Eliquis.    He was skin is pretty dry and I do suggest him to use some lotion.  I do see some excoriations your.    My protocol will be as below:-    1.-amlodipine for 5 days  2.-metoprolol for 5 days   3.-omeprazole if not tried before for 5 days   4.-Dyazide for 5 days    Try as he other external source of contact or allergens and avoid that.      We did discuss about his diet and mostly he eats greens, vegetables, salads and fish.  It does not eat any salty, fatty or processed food.          Hypertension  This is a chronic problem. The current episode started more than 1 year ago. The problem has been gradually improving since onset. The problem is controlled. Pertinent negatives include no chest pain, malaise/fatigue, palpitations, peripheral edema or shortness of breath. Risk factors for coronary artery disease include male gender, obesity and dyslipidemia. Past treatments include beta blockers, diuretics and calcium channel blockers. The current treatment provides moderate improvement. There is no history of pheochromocytoma.   Atrial Fibrillation  Presents for follow-up visit. Symptoms are negative for chest pain, dizziness, hypertension, hypotension, palpitations, shortness of breath, syncope and weakness. The symptoms have been stable. Past medical history includes atrial fibrillation and hyperlipidemia. Medication compliance problems include psychosocial issues.   Hyperlipidemia  This is a recurrent problem. The current episode started more than 1 year ago. The problem is uncontrolled. Recent lipid tests were reviewed and are high. Exacerbating diseases include obesity. Pertinent negatives include no chest pain or shortness  of breath. Current antihyperlipidemic treatment includes fibric acid derivatives. The current treatment provides moderate improvement of lipids. Risk factors for coronary artery disease include a sedentary lifestyle, hypertension, male sex and dyslipidemia.   Gastroesophageal Reflux  He complains of heartburn. He reports no chest pain, no choking or no wheezing. This is a recurrent problem. The problem has been rapidly improving (after qutting smoking). Pertinent negatives include no fatigue or melena. Risk factors include obesity and caffeine use (Cut down on coffee and quit smoking). He has tried a PPI for the symptoms. The treatment provided significant (after quitting smoking) relief.   Sinus Problem  This is a chronic problem. The current episode started more than 1 year ago. The problem has been waxing and waning since onset. There has been no fever. He is experiencing no pain. Associated symptoms include sinus pressure. Pertinent negatives include no chills, congestion, diaphoresis or shortness of breath.       Past Medical History:   Diagnosis Date    A-fib     Acute vasomotor rhinitis 03/20/2019    Chronic maxillary sinusitis 03/20/2019    Closed fracture of neck of right femur 12/22/2019    Current smoker 11/08/2017    Duodenal ulcer     Fracture of femoral neck, right 12/22/2019    GERD (gastroesophageal reflux disease)     Hay fever 11/08/2017    History of colonoscopy 03/26/2024         Dr. Víctor Davison.  To be repeated in 2034.     Hypertension     Hypokalemia 11/08/2017    Patient's potassium level is 3.1. He is on diuretics.Did not take K pills meli vázquez bothered hsi stomac. New pills from mail order are tolerable.    Hypophosphatemia 12/23/2019    Inguinal hernia     Kidney stone     Neck pain, bilateral 05/08/2018    PAC (premature atrial contraction)     Rash and nonspecific skin eruption 05/04/2020    Rhinitis medicamentosa 03/20/2019    Skin cancer     Squamous cell carcinoma of skin       Social History     Socioeconomic History    Marital status:      Spouse name: Candie Segura    Number of children: 0   Occupational History    Occupation: [employer]   Tobacco Use    Smoking status: Former     Current packs/day: 0.00     Average packs/day: 0.5 packs/day for 38.6 years (19.3 ttl pk-yrs)     Types: Cigarettes     Start date: 1980     Quit date: 2018     Years since quittin.8    Smokeless tobacco: Never    Tobacco comments:     Counselled   Substance and Sexual Activity    Alcohol use: Not Currently    Drug use: No    Sexual activity: Yes     Partners: Female     Social Determinants of Health     Financial Resource Strain: Low Risk  (2023)    Overall Financial Resource Strain (CARDIA)     Difficulty of Paying Living Expenses: Not very hard   Food Insecurity: No Food Insecurity (2023)    Hunger Vital Sign     Worried About Running Out of Food in the Last Year: Never true     Ran Out of Food in the Last Year: Never true   Transportation Needs: No Transportation Needs (2023)    PRAPARE - Transportation     Lack of Transportation (Medical): No     Lack of Transportation (Non-Medical): No   Physical Activity: Inactive (2023)    Exercise Vital Sign     Days of Exercise per Week: 0 days     Minutes of Exercise per Session: 0 min   Stress: No Stress Concern Present (2023)    [employer] [employer] - Occupational Stress Questionnaire     Feeling of Stress : Only a little   Housing Stability: Low Risk  (2023)    Housing Stability Vital Sign     Unable to Pay for Housing in the Last Year: No     Number of Places Lived in the Last Year: 1     Unstable Housing in the Last Year: No     Past Surgical History:   Procedure Laterality Date    EXCISION OF SQUAMOUS CELL CARCINOMA      Head    KNEE SURGERY      OPEN REDUCTION AND INTERNAL FIXATION (ORIF) OF FRACTURE OF TIBIAL PLATEAU Left     OPEN REDUCTION AND INTERNAL FIXATION  (ORIF) OF INJURY OF HIP Right 12/22/2019    Procedure: ORIF, HIP;  Surgeon: Todd Andrews Jr., MD;  Location: [facility];  Service: Orthopedics;  Laterality: Right;    QUADRICEPS TENDON REPAIR Left     ROTATOR CUFF REPAIR Left 2012    ROTATOR CUFF REPAIR Right 1994    SINUS SURGERY      TREATMENT OF CARDIAC ARRHYTHMIA N/A 6/5/2020    Procedure: CARDIOVERSION;  Surgeon: James Richter MD;  [facility] CATH[facility]EP LAB;  Service: Cardiology;  Laterality: N/A;     Family History   Problem Relation Name Age of Onset    Hepatitis Mother Amee Segura         Treated with Harvoni    Heart disease Father         Review of Systems   Constitutional:  Negative for activity change, chills, diaphoresis, fatigue, malaise/fatigue and unexpected weight change.   HENT:  Positive for sinus pressure. Negative for congestion.    Respiratory:  Negative for choking, shortness of breath and wheezing.    Cardiovascular:  Negative for chest pain, palpitations and syncope.   Gastrointestinal:  Positive for heartburn and rectal pain. Negative for abdominal distention, blood in stool and melena.   Musculoskeletal:  Negative for arthralgias and gait problem.   Skin:         Itching dry skin   Neurological:  Negative for dizziness and weakness.         Objective:      Blood pressure 115/86, pulse 70, height 6' (1.829 m), weight 101.2 kg (223 lb). Body mass index is 30.24 kg/m².  Physical Exam  Constitutional:       General: He is not in acute distress.     Appearance: He is not ill-appearing, toxic-appearing or diaphoretic.   Eyes:      General: No scleral icterus.  Neck:      Vascular: No carotid bruit.   Cardiovascular:      Rate and Rhythm: Regular rhythm. Tachycardia present.      Heart sounds: No murmur heard.     No friction rub.   Pulmonary:      Effort: No respiratory distress.      Breath sounds: No stridor. No wheezing or rhonchi.   Abdominal:      General: Abdomen is flat.      Hernia: A hernia is present.   Musculoskeletal:       Cervical back: No rigidity or tenderness.      Right lower leg: No edema.      Left lower leg: No edema.   Lymphadenopathy:      Cervical: No cervical adenopathy.   Skin:     Coloration: Skin is not jaundiced or pale.      Findings: No abscess, bruising, signs of injury or rash. Rash is not crusting or vesicular.      Comments: Skin appeal extremely dry with areas of excoriation scratching.   Neurological:      Mental Status: He is alert. Mental status is at baseline.   Psychiatric:         Behavior: Behavior normal.           Assessment:       Patient Outreach on 03/26/2024   Component Date Value Ref Range Status    CRC Recommendation External 02/01/2024 Repeat colonoscopy in 10 years   Final       1. Itching  Comments:  Persistent the some itching.  Give 5 day trial of stopped each medication at a time.    2. Essential hypertension  Comments:  Blood pressures are doing good at this point with a combination of amlodipine 5, metoprolol 100 b.i.d.    3. Persistent atrial fibrillation  Comments:  On anticoagulation and also metoprolol.    4. Mixed dyslipidemia    5. Low magnesium level  Comments:  Continue mesalamine.  Especially will help arrhythmias.    6. Anticoagulated    7. Unilateral inguinal hernia without obstruction or gangrene, recurrence not specified  -     Ambulatory referral/consult to General Surgery; Future; Expected date: 07/13/2024    8. Screening for prostate cancer  Comments:  General discussion about prostate cancer screening and will add for or at the time of next visit.           Impression:     1. Fixation of right comminuted intertrochanteric fracture with intact hardware.  Persistent healing intratrochanteric fracture lines are noted.  2. Fat filled right inguinal hernia.        Electronically signed by:Allen Spencer MD  Date:                                            04/13/2020  Time:                                           13:54           Exam Ended: 04/13/20 13:39 CDT Last Resulted:  04/13/20 13:54 CDT             Plan:   Itching  Comments:  Persistent the some itching.  Give 5 day trial of stopped each medication at a time.    Essential hypertension  Comments:  Blood pressures are doing good at this point with a combination of amlodipine 5, metoprolol 100 b.i.d.    Persistent atrial fibrillation  Comments:  On anticoagulation and also metoprolol.    Mixed dyslipidemia    Low magnesium level  Comments:  Continue mesalamine.  Especially will help arrhythmias.    Anticoagulated    Unilateral inguinal hernia without obstruction or gangrene, recurrence not specified  -     Ambulatory referral/consult to General Surgery; Future; Expected date: 07/13/2024    Screening for prostate cancer  Comments:  General discussion about prostate cancer screening and will add for or at the time of next visit.    Patient has persistent itching has been noted and I have set up a 5 day discontinuation protocol for each drug and see if it makes any difference.  Try to check for the environmental factors also.  He was done adequate research on that.  Stopping the cholesterol medication has not bought any difference at this point at least.    His skin is dry definitely.  I am not sure if there any family history of such problems.    He also has a hernia on the right inguinal region or at least as shown by the CT scan in past.  Will get a surgical evaluation at his timing and at his preference.    Other conditions also noted     Continue with infection precautions and consider taking shingles vaccine from local pharmacy and also RSV vaccine.      Thus far he had at least 7 COVID vaccines.      He keeps himself physically and mentally active.  He did participate in his 62 miles biking at this point.  Recent cardiology evaluation was good.  I will check the reports through the epic system.    Sent a communication to Dr. Godinez also about the medication holding trial.    1.-hold amlodipine for 5 days   2.-hold fluoxetine  for 5 days   3.-hold omeprazole for 5 days   4.-consider holding metoprolol for a few days but to check with Dr. Godinez also.    Follow up in about 6 months (around 12/13/2024), or if symptoms worsen or fail to improve, for Hypertension/lipids/afib.      Current Outpatient Medications:     amLODIPine (NORVASC) 5 MG tablet, Take 1 tablet (5 mg total) by mouth once daily., Disp: 30 tablet, Rfl: 3    apixaban (ELIQUIS) 5 mg Tab, TAKE 1 TABLET(5 MG) BY MOUTH TWICE DAILY, Disp: 180 tablet, Rfl: 1    CEREFOLIN NAC, ALGAL OIL, 6 mg-600 mg- 2 mg-90.314 mg Tab, TAKE 1 TABLET BY MOUTH EVERY DAY, Disp: 90 tablet, Rfl: 3    clobetasoL (TEMOVATE) 0.05 % cream, Apply topically 2 (two) times daily., Disp: , Rfl:     clotrimazole-betamethasone 1-0.05% (LOTRISONE) cream, APPLY TOPICALLY TO THE AFFECTED AREA TWICE DAILY SPARINGLY, Disp: 45 g, Rfl: 3    FLUoxetine 10 MG capsule, Take 1 capsule (10 mg total) by mouth 2 (two) times daily., Disp: 180 capsule, Rfl: 1    FLUoxetine 20 MG capsule, Take 1 capsule (20 mg total) by mouth daily., Disp: 90 capsule, Rfl: 1    fluticasone propionate (FLONASE) 50 mcg/actuation nasal spray, 1 spray (50 mcg total) by Each Nostril route once daily., Disp: 16 g, Rfl: 5    magnesium oxide (MAG-OX) 400 mg (241.3 mg magnesium) tablet, Take 1 tablet (400 mg total) by mouth once daily., Disp: 90 tablet, Rfl: 3    metoprolol tartrate (LOPRESSOR) 100 MG tablet, Take 1 tablet (100 mg total) by mouth 2 (two) times daily., Disp: 180 tablet, Rfl: 3    mupirocin (BACTROBAN) 2 % ointment, Apply topically 3 (three) times daily. Please minimize the use of this antibiotic cream to prevent resistance in future., Disp: 22 g, Rfl: 0    omeprazole (PRILOSEC OTC) 20 MG tablet, Take 1 tablet by mouth once daily., Disp: , Rfl:     potassium chloride (MICRO-K) 10 MEQ CpSR, Take 1 capsule (10 mEq total) by mouth 2 (two) times daily., Disp: 180 capsule, Rfl: 1    predniSONE (DELTASONE) 20 MG tablet, 3 po qam x 2 days 2 pills  po qam with breakfast x 1 wk then take 1 po qam with breakfast x 1 wk then take 1/2 po qam with breakfast x 6 days, Disp: 30 tablet, Rfl: 0    predniSONE (DELTASONE) 5 MG tablet, Take 1 tablet (5 mg total) by mouth daily as needed (itching)., Disp: 30 tablet, Rfl: 1    triamterene-hydrochlorothiazide 37.5-25 mg (MAXZIDE-25) 37.5-25 mg per tablet, Take 1 tablet by mouth once daily., Disp: 90 tablet, Rfl: 3    amlodipine-atorvastatin (CADUET) 5-20 mg per tablet, Take 1 tablet by mouth once daily. (Patient not taking: Reported on 6/13/2024), Disp: 90 tablet, Rfl: 2    Bassam Lopez

## 2024-06-13 NOTE — LETTER
June 13, 2024        James Richter MD  [address]  [address]  [place] [address]             [facility] [address]  [address]  [address]  [place] [address]  Phone: [phone]  Fax: [phone]   Patient: Howard Segura   MR Number: [id number]   YOB: 1960   Date of Visit: 6/13/2024     Dear Dr. Richter,     Saw this gentleman today in office.  Main issue seems to be extremely bothersome itching day and night to the point of excoriation.  I have advised him to stop each medication for 5 days beginning with atorvastatin and then amlodipine.  I am concerned about holding off metoprolol and Eliquis given his AFib.  Prior to that I am going to try the medications see if there is any drug cause.  He was had extensive workup including biopsy and no answers thus far.    Sincerely,      Bassam Lopez MD            CC  No Recipients    Enclosure

## 2024-08-11 ENCOUNTER — PATIENT MESSAGE (OUTPATIENT)
Dept: FAMILY MEDICINE | Facility: CLINIC | Age: 64
End: 2024-08-11
Payer: COMMERCIAL

## 2024-08-12 RX ORDER — OMEPRAZOLE 20 MG/1
20 CAPSULE, DELAYED RELEASE ORAL DAILY
Qty: 90 CAPSULE | Refills: 1 | Status: SHIPPED | OUTPATIENT
Start: 2024-08-12 | End: 2025-02-08

## 2024-10-15 DIAGNOSIS — I48.0 PAROXYSMAL ATRIAL FIBRILLATION: ICD-10-CM

## 2024-10-15 DIAGNOSIS — R79.0 LOW MAGNESIUM LEVEL: ICD-10-CM

## 2024-10-16 RX ORDER — LANOLIN ALCOHOL/MO/W.PET/CERES
400 CREAM (GRAM) TOPICAL DAILY
Qty: 90 TABLET | Refills: 3 | Status: SHIPPED | OUTPATIENT
Start: 2024-10-16

## 2024-11-14 ENCOUNTER — PATIENT MESSAGE (OUTPATIENT)
Dept: FAMILY MEDICINE | Facility: CLINIC | Age: 64
End: 2024-11-14
Payer: COMMERCIAL

## 2024-11-15 ENCOUNTER — LAB VISIT (OUTPATIENT)
Dept: LAB | Facility: HOSPITAL | Age: 64
End: 2024-11-15
Attending: INTERNAL MEDICINE
Payer: COMMERCIAL

## 2024-11-15 DIAGNOSIS — Z12.5 SCREENING FOR PROSTATE CANCER: ICD-10-CM

## 2024-11-15 DIAGNOSIS — E78.2 MIXED DYSLIPIDEMIA: ICD-10-CM

## 2024-11-15 DIAGNOSIS — I48.19 PERSISTENT ATRIAL FIBRILLATION: ICD-10-CM

## 2024-11-15 DIAGNOSIS — I10 ESSENTIAL HYPERTENSION: ICD-10-CM

## 2024-11-15 DIAGNOSIS — R73.9 ELEVATED BLOOD SUGAR: ICD-10-CM

## 2024-11-15 LAB
ALBUMIN SERPL BCP-MCNC: 4.7 G/DL (ref 3.5–5.2)
ALBUMIN/CREAT UR: 17.5 UG/MG (ref 0–30)
ALP SERPL-CCNC: 62 U/L (ref 55–135)
ALT SERPL W/O P-5'-P-CCNC: 24 U/L (ref 10–44)
ANION GAP SERPL CALC-SCNC: 9 MMOL/L (ref 8–16)
AST SERPL-CCNC: 20 U/L (ref 10–40)
BASOPHILS # BLD AUTO: 0.07 K/UL (ref 0–0.2)
BASOPHILS NFR BLD: 1 % (ref 0–1.9)
BILIRUB SERPL-MCNC: 1.1 MG/DL (ref 0.1–1)
BUN SERPL-MCNC: 14 MG/DL (ref 8–23)
CALCIUM SERPL-MCNC: 9.6 MG/DL (ref 8.7–10.5)
CHLORIDE SERPL-SCNC: 101 MMOL/L (ref 95–110)
CHOLEST SERPL-MCNC: 167 MG/DL (ref 120–199)
CHOLEST/HDLC SERPL: 4 {RATIO} (ref 2–5)
CO2 SERPL-SCNC: 30 MMOL/L (ref 23–29)
COMPLEXED PSA SERPL-MCNC: 1.55 NG/ML (ref 0–4)
CREAT SERPL-MCNC: 1 MG/DL (ref 0.5–1.4)
CREAT UR-MCNC: 150 MG/DL (ref 23–375)
DIFFERENTIAL METHOD BLD: ABNORMAL
EOSINOPHIL # BLD AUTO: 0.5 K/UL (ref 0–0.5)
EOSINOPHIL NFR BLD: 7 % (ref 0–8)
ERYTHROCYTE [DISTWIDTH] IN BLOOD BY AUTOMATED COUNT: 13.4 % (ref 11.5–14.5)
EST. GFR  (NO RACE VARIABLE): >60 ML/MIN/1.73 M^2
ESTIMATED AVG GLUCOSE: 126 MG/DL (ref 68–131)
GLUCOSE SERPL-MCNC: 122 MG/DL (ref 70–110)
HBA1C MFR BLD: 6 % (ref 4.5–6.2)
HCT VFR BLD AUTO: 42.6 % (ref 40–54)
HDLC SERPL-MCNC: 42 MG/DL (ref 40–75)
HDLC SERPL: 25.1 % (ref 20–50)
HGB BLD-MCNC: 14.6 G/DL (ref 14–18)
IMM GRANULOCYTES # BLD AUTO: 0.04 K/UL (ref 0–0.04)
IMM GRANULOCYTES NFR BLD AUTO: 0.6 % (ref 0–0.5)
LDLC SERPL CALC-MCNC: 70 MG/DL (ref 63–159)
LYMPHOCYTES # BLD AUTO: 1.7 K/UL (ref 1–4.8)
LYMPHOCYTES NFR BLD: 24.5 % (ref 18–48)
MCH RBC QN AUTO: 30.4 PG (ref 27–31)
MCHC RBC AUTO-ENTMCNC: 34.3 G/DL (ref 32–36)
MCV RBC AUTO: 89 FL (ref 82–98)
MICROALBUMIN UR DL<=1MG/L-MCNC: 26.2 UG/ML
MONOCYTES # BLD AUTO: 0.8 K/UL (ref 0.3–1)
MONOCYTES NFR BLD: 11.8 % (ref 4–15)
NEUTROPHILS # BLD AUTO: 3.9 K/UL (ref 1.8–7.7)
NEUTROPHILS NFR BLD: 55.1 % (ref 38–73)
NONHDLC SERPL-MCNC: 125 MG/DL
NRBC BLD-RTO: 0 /100 WBC
PLATELET # BLD AUTO: 180 K/UL (ref 150–450)
PMV BLD AUTO: 9.4 FL (ref 9.2–12.9)
POTASSIUM SERPL-SCNC: 4 MMOL/L (ref 3.5–5.1)
PROT SERPL-MCNC: 7 G/DL (ref 6–8.4)
RBC # BLD AUTO: 4.8 M/UL (ref 4.6–6.2)
SODIUM SERPL-SCNC: 140 MMOL/L (ref 136–145)
TRIGL SERPL-MCNC: 275 MG/DL (ref 30–150)
WBC # BLD AUTO: 7.11 K/UL (ref 3.9–12.7)

## 2024-11-15 PROCEDURE — 82043 UR ALBUMIN QUANTITATIVE: CPT | Performed by: INTERNAL MEDICINE

## 2024-11-15 PROCEDURE — 84153 ASSAY OF PSA TOTAL: CPT | Performed by: INTERNAL MEDICINE

## 2024-11-15 PROCEDURE — 83036 HEMOGLOBIN GLYCOSYLATED A1C: CPT | Performed by: INTERNAL MEDICINE

## 2024-11-15 PROCEDURE — 36415 COLL VENOUS BLD VENIPUNCTURE: CPT | Performed by: INTERNAL MEDICINE

## 2024-11-15 PROCEDURE — 80061 LIPID PANEL: CPT | Performed by: INTERNAL MEDICINE

## 2024-11-15 PROCEDURE — 80053 COMPREHEN METABOLIC PANEL: CPT | Performed by: INTERNAL MEDICINE

## 2024-11-15 PROCEDURE — 85025 COMPLETE CBC W/AUTO DIFF WBC: CPT | Performed by: INTERNAL MEDICINE

## 2024-12-02 NOTE — PROGRESS NOTES
Subjective:       Patient ID: Howard Segura is a 64 y.o. male.    Chief Complaint: Hypertension, Labs Only, Insomnia, and Atrial Fibrillation    History of Present Illness    CHIEF COMPLAINT:  Howard, a 64-year-old gentleman, presents for follow-up regarding chronic insomnia and difficulty initiating sleep.    HPI:  Howard reports significant difficulty falling asleep, which has been ongoing for some time. He is unable to sleep and has trouble relaxing enough to initiate sleep. On one occasion, he fell asleep while driving at a traffic light. He used to sleep well when fatigued, but now struggles to fall asleep even when tired.    His sleep issues began relatively recently. He denies any physical pain or discomfort preventing sleep. His wife notes that he has been snoring more at night than he used to, and has constant jerky movements of his legs and arms when first falling asleep.    Howard tried OTC CBD gummies with THC while in [place], which significantly improved his sleep. 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    His mind sometimes races when trying to sleep, but he denies significant worry or stress. On one occasion, he went 3 days without sleeping before finally falling asleep.    His wife expresses concern about his lack of sleep, noting its importance for brain health, especially given that his mother has Alzheimer's disease.    Blood pressure control is acceptable currently he was taking amlodipine/atorvastatin combination Dyazide and metoprolol 100 mg twice a day.    He was followed up with the cardiologist Dr. Godinez also with no major changes.  Atrial fibrillation has been noted for which he takes Eliquis.  Rate is very well controlled  MEDICATIONS:  Howard is on Fluoxetine 30 mg daily in the morning for itching. He is also on Eliquis for atrial fibrillation, Amlodipine and Diazide (HCTZ) in the morning for hypertension, Atorvastatin for  hyperlipidemia, and Metoprolol for hypertension/atrial fibrillation. Howard takes Prednisone as needed for itching and is also on Serafolin.    MEDICAL HISTORY:  Howard has a history of hypertension, hyperlipidemia, atrial fibrillation, pruritus, and pre-diabetes. Howard received a flu vaccine today.    FAMILY HISTORY:  Family history is significant for mother with Alzheimer's disease.    SURGICAL HISTORY:  Howard recently underwent a colonoscopy.    TEST RESULTS:  Howard's blood sugar is slightly elevated at 122, falling within the pre-diabetic range. His kidney tests and electrolytes are normal. Blood counts show 14.6, previously 13.8, indicating mild anemia. Hemoglobin A1c is 6.0, previously 5.7. Cholesterol levels are good. Triglycerides are at 275, previously 559 (highest) and 150 (best, about 1 year ago). PSA is 1.55, previously 2.14 (1 year ago). Urine test shows good numbers with slightly increased protein.    SOCIAL HISTORY:  Howard occasionally consumes alcohol, mentioning having a glass of wine. He is retired, having worked as a  for 41 years.      ROS:  Skin: +itching  Psychiatric: +sleep difficulty, -memory problems   Cardiovascular:-no chest pains, shortness a breath or palpitations  Pulmonary no cough or expectoration.  No pleuritic chest pain.  GI-no nausea, vomiting or diarrhea.  Skin, chronic itching as discussed above.  General-no fever, weight loss or weight gain.        Past Medical History:   Diagnosis Date    A-fib     Acute vasomotor rhinitis 03/20/2019    Chronic maxillary sinusitis 03/20/2019    Closed fracture of neck of right femur 12/22/2019    Current smoker 11/08/2017    Duodenal ulcer     Fracture of femoral neck, right 12/22/2019    GERD (gastroesophageal reflux disease)     Hay fever 11/08/2017    History of colonoscopy 03/26/2024         Dr. Víctor Davison.  To be repeated in 2034.     Hypertension     Hypokalemia 11/08/2017    Patient's potassium level is 3.1. He is on  diuretics.Did not take K pills meli vázquez bothered hsi stomac. New pills from mail order are tolerable.    Hypophosphatemia 2019    Inguinal hernia     Kidney stone     Neck pain, bilateral 2018    PAC (premature atrial contraction)     Rash and nonspecific skin eruption 2020    Rhinitis medicamentosa 2019    Skin cancer     Squamous cell carcinoma of skin      Social History     Socioeconomic History    Marital status:      Spouse name: Candie Segura    Number of children: 0   Occupational History    Occupation: [employer]   Tobacco Use    Smoking status: Former     Current packs/day: 0.00     Average packs/day: 0.5 packs/day for 38.6 years (19.3 ttl pk-yrs)     Types: Cigarettes     Start date: 1980     Quit date: 2018     Years since quittin.3    Smokeless tobacco: Never    Tobacco comments:     Counselled   Substance and Sexual Activity    Alcohol use: Not Currently    Drug use: No    Sexual activity: Yes     Partners: Female     Social Drivers of Health     Financial Resource Strain: Low Risk  (2023)    Overall Financial Resource Strain (CARDIA)     Difficulty of Paying Living Expenses: Not very hard   Food Insecurity: No Food Insecurity (2023)    Hunger Vital Sign     Worried About Running Out of Food in the Last Year: Never true     Ran Out of Food in the Last Year: Never true   Transportation Needs: No Transportation Needs (2023)    PRAPARE - Transportation     Lack of Transportation (Medical): No     Lack of Transportation (Non-Medical): No   Physical Activity: Inactive (2023)    Exercise Vital Sign     Days of Exercise per Week: 0 days     Minutes of Exercise per Session: 0 min   Stress: No Stress Concern Present (2023)    [employer] - Occupational Stress Questionnaire     Feeling of Stress : Only a little   Housing Stability: Low Risk  (2023)    Housing Stability Vital Sign      Unable to Pay for Housing in the Last Year: No     Number of Places Lived in the Last Year: 1     Unstable Housing in the Last Year: No     Past Surgical History:   Procedure Laterality Date    EXCISION OF SQUAMOUS CELL CARCINOMA      Head    KNEE SURGERY      OPEN REDUCTION AND INTERNAL FIXATION (ORIF) OF FRACTURE OF TIBIAL PLATEAU Left     OPEN REDUCTION AND INTERNAL FIXATION (ORIF) OF INJURY OF HIP Right 12/22/2019    Procedure: ORIF, HIP;  Surgeon: Todd Andrews Jr., MD;  Location: [facility];  Service: Orthopedics;  Laterality: Right;    QUADRICEPS TENDON REPAIR Left     ROTATOR CUFF REPAIR Left 2012    ROTATOR CUFF REPAIR Right 1994    SINUS SURGERY      TREATMENT OF CARDIAC ARRHYTHMIA N/A 6/5/2020    Procedure: CARDIOVERSION;  Surgeon: James Richter MD;  Location: [facility] CATH/EP LAB;  Service: Cardiology;  Laterality: N/A;     Family History   Problem Relation Name Age of Onset    Hepatitis Mother Amee Segura         Treated with Harvoni    Heart disease Father         Objective:      Physical Exam  Blood pressure 126/81, pulse 66, height 6' (1.829 m), weight 100.7 kg (222 lb). Body mass index is 30.11 kg/m².  Physical Exam    General: No acute distress. Well-developed. Well-nourished.  Eyes: EOMI. Sclerae anicteric.  HENT: Normocephalic. Atraumatic. Nares patent. Moist oral mucosa.    Cardiovascular: Regular rate. Regular rhythm. No murmurs. No rubs. No gallops. Normal S1, S2.  Respiratory: Normal respiratory effort. Clear to auscultation bilaterally. No rales. No rhonchi. No wheezing.  Abdomen: Soft. Non-tender. Non-distended. Normoactive bowel sounds.  Musculoskeletal: No  obvious deformity.  Extremities: No lower extremity edema.  Neurological: Alert & oriented x3. No slurred speech. Normal gait.  Psychiatric:  Slightly anxious   Skin: Warm. Dry. No rash.              Assessment:       Lab Visit on 11/15/2024   Component Date Value Ref Range Status    PSA, Screen 11/15/2024 1.55  0.00 - 4.00  ng/mL Final    Cholesterol 11/15/2024 167  120 - 199 mg/dL Final    Triglycerides 11/15/2024 275 (H)  30 - 150 mg/dL Final    HDL 11/15/2024 42  40 - 75 mg/dL Final    LDL Cholesterol 11/15/2024 70.0  63.0 - 159.0 mg/dL Final    HDL/Cholesterol Ratio 11/15/2024 25.1  20.0 - 50.0 % Final    Total Cholesterol/HDL Ratio 11/15/2024 4.0  2.0 - 5.0 Final    Non-HDL Cholesterol 11/15/2024 125  mg/dL Final    Sodium 11/15/2024 140  136 - 145 mmol/L Final    Potassium 11/15/2024 4.0  3.5 - 5.1 mmol/L Final    Chloride 11/15/2024 101  95 - 110 mmol/L Final    CO2 11/15/2024 30 (H)  23 - 29 mmol/L Final    Glucose 11/15/2024 122 (H)  70 - 110 mg/dL Final    BUN 11/15/2024 14  8 - 23 mg/dL Final    Creatinine 11/15/2024 1.0  0.5 - 1.4 mg/dL Final    Calcium 11/15/2024 9.6  8.7 - 10.5 mg/dL Final    Total Protein 11/15/2024 7.0  6.0 - 8.4 g/dL Final    Albumin 11/15/2024 4.7  3.5 - 5.2 g/dL Final    Total Bilirubin 11/15/2024 1.1 (H)  0.1 - 1.0 mg/dL Final    Alkaline Phosphatase 11/15/2024 62  55 - 135 U/L Final    AST 11/15/2024 20  10 - 40 U/L Final    ALT 11/15/2024 24  10 - 44 U/L Final    eGFR 11/15/2024 >60.0  >60 mL/min/1.73 m^2 Final    Anion Gap 11/15/2024 9  8 - 16 mmol/L Final    WBC 11/15/2024 7.11  3.90 - 12.70 K/uL Final    RBC 11/15/2024 4.80  4.60 - 6.20 M/uL Final    Hemoglobin 11/15/2024 14.6  14.0 - 18.0 g/dL Final    Hematocrit 11/15/2024 42.6  40.0 - 54.0 % Final    MCV 11/15/2024 89  82 - 98 fL Final    MCH 11/15/2024 30.4  27.0 - 31.0 pg Final    MCHC 11/15/2024 34.3  32.0 - 36.0 g/dL Final    RDW 11/15/2024 13.4  11.5 - 14.5 % Final    Platelets 11/15/2024 180  150 - 450 K/uL Final    MPV 11/15/2024 9.4  9.2 - 12.9 fL Final    Immature Granulocytes 11/15/2024 0.6 (H)  0.0 - 0.5 % Final    Gran # (ANC) 11/15/2024 3.9  1.8 - 7.7 K/uL Final    Immature Grans (Abs) 11/15/2024 0.04  0.00 - 0.04 K/uL Final    Lymph # 11/15/2024 1.7  1.0 - 4.8 K/uL Final    Mono # 11/15/2024 0.8  0.3 - 1.0 K/uL Final    Eos #  11/15/2024 0.5  0.0 - 0.5 K/uL Final    Baso # 11/15/2024 0.07  0.00 - 0.20 K/uL Final    nRBC 11/15/2024 0  0 /100 WBC Final    Gran % 11/15/2024 55.1  38.0 - 73.0 % Final    Lymph % 11/15/2024 24.5  18.0 - 48.0 % Final    Mono % 11/15/2024 11.8  4.0 - 15.0 % Final    Eosinophil % 11/15/2024 7.0  0.0 - 8.0 % Final    Basophil % 11/15/2024 1.0  0.0 - 1.9 % Final    Differential Method 11/15/2024 Automated   Final    Microalbumin, Urine 11/15/2024 26.2 (H)  <19.9 ug/mL Final    Creatinine, Urine 11/15/2024 150.0  23.0 - 375.0 mg/dL Final    Microalb/Creat Ratio 11/15/2024 17.5  0.0 - 30.0 ug/mg Final    Hemoglobin A1C 11/15/2024 6.0  4.5 - 6.2 % Final    Estimated Avg Glucose 11/15/2024 126  68 - 131 mg/dL Final     Component Ref Range & Units 2 wk ago 1 yr ago 3 yr ago 4 yr ago 5 yr ago 6 yr ago 7 yr ago   PSA, Screen 0.00 - 4.00 ng/mL 1.55 2.14 CM 2.3 1.6 1.6 1.6 R, CM 1.4 R     omponent Ref Range & Units 2 wk ago  (11/15/24) 2 yr ago  (11/17/22) 2 yr ago  (5/4/22) 4 yr ago  (12/22/19)   Hemoglobin A1C 4.5 - 6.2 % 6.0 5.7 CM 6.0 CM 5.5 R, CM     Assessment & Plan    IMPRESSION:  - Assessed patient's insomnia, considering potential contributing factors such as CHCF, circadian rhythm disruption, and medication side effects  - Evaluated effectiveness of current medications, including Fluoxetine for itching  - Will initiate Trazodone for insomnia management, starting with a low dose and titrating as needed  - Reviewed recent lab results, noting slightly elevated blood sugar and triglycerides    G47.00 INSOMNIA, UNSPECIFIED:  - Emphasized the importance of sleep for overall health and cognitive function.  - Discussed the concept of circadian rhythms and their impact on sleep patterns.  - Educated on sleep hygiene practices, including caffeine intake, meal timing, and bedroom environment.  - Discussed potential side effects of Trazodone, including possible weight gain.  - Started Trazodone 50 mg for insomnia: Take  1 hour before desired sleep time.  - First night: Take 1/2 tablet (25 mg).  - Second night: Take 1/2 tablet (25 mg).  - Third night and beyond: Take 1 full tablet (50 mg).  - May increase dosage based on effectiveness and doctor's guidance.  - Limit alcohol intake to 1 drink when taking Trazodone.  - Send a message through the patient portal on Monday to report experience with Trazodone.    I10 ESSENTIAL (PRIMARY) HYPERTENSION:  - Continued amlodipine for blood pressure.  - Continued metoprolol.  - Continued Diazide (HCTZ) in the morning.    I48.91 UNSPECIFIED ATRIAL FIBRILLATION:  - Continued Eliquis for atrial fibrillation.    E78.5 HYPERLIPIDEMIA, UNSPECIFIED:  - Continued atorvastatin for cholesterol.    LIFESTYLE CHANGES:  - Howard to limit caffeine intake, especially in the afternoon and evening.  - Recommend eating dinner earlier in the evening, around 2-3 pm.  - Howard to have a small snack before bedtime to prevent going to sleep on an empty stomach.  - Recommend maintaining proper hydration, focusing on morning and early afternoon fluid intake.  - Howard to limit water intake after 5-6 pm to reduce nighttime bathroom trips.  - Recommend adjusting room temperature for comfort, using extra blankets if needed.  - Howard to empty bladder before bedtime.  - Recommend continuing current exercise routine of shooting, bicycling, and walking.  - Continued Fluoxetine 30 mg in the morning for itching.  - Continued prednisone as needed for severe itching.  - Follow up in 3 months.         Plan:   Persistent atrial fibrillation  Comments:  Recent cardiology visit noted.  Continues on anticoagulation.  Exercise tolerance good.  Rate controlled.  Continue magnesium.  And metoprolol    Essential hypertension  Comments:  Currently on caduet,(combination of amlodipine & atorvastatin)Dyazide & amlodipine 5 mg additionally individually.Consider-amlodipine & atorvastatin separation.    Mixed dyslipidemia  Comments:  Carry on caduet  which is combination of amlodipine and atorvastatin.  Separately also on amlodipine.  Consider separation    Elevated blood sugar  Comments:  Steroids.    Gastroesophageal reflux disease with esophagitis without hemorrhage    Anticoagulated    Low magnesium level  Comments:  He continues with magnesium supplements which may help stabilize his cardiac rhythm.    Pruritus sine materia  Comments:  Diagnosed by allergist and immunologist Dr. Diana Ram.  On prednisone.    Need for influenza vaccination  -     influenza (Flulaval, Fluzone, Fluarix) 45 mcg/0.5 mL IM vaccine (> or = 6 mo) 0.5 mL    Psychophysiological insomnia  -     traZODone (DESYREL) 50 MG tablet; Take 1 tablet (50 mg total) by mouth every evening.  Dispense: 30 tablet; Refill: 2    Patient's medical issues has been noted  Insomnia seems to be a big issue with recent onset.  Trial of trazodone.  He had tried THC products in [place] with good relief at that point.  Probably some psychosocial components of insomnia.  Trial of trazodone.  Recent visit to Dr. Rober MD cardiology also has been noted.  Stable status of atrial fibrillation was noted with no new symptoms and continued exercise 3 miles 4 hour.  Biking 10 miles also.  EKG had shown atrial fibrillation with controlled ventricular rate some STT wave abnormalities in the anterolateral leads and PVCs and aberrantly conducted complexes and LVH.  Follow up was recommended in 6 months.  Recent hemoglobin A1c 6.0 as compared to 5.7 indicating slight rise in blood sugars though not in diabetic range  Use of prednisone as a steroid also has been noted which was prescribed by Dr. Diana Ram for his itching diabetes Sine materia  Microalbumin is slightly elevated and this will be monitored  Panel shows a total cholesterol of 167, LDL off 70 and rise in triglycerides 275 which seems to be consistent with his recently elevated hemoglobin A1c.  PSA screening is within normal range.    Follow  up in about 3 months (around 3/3/2025), or if symptoms worsen or fail to improve, for insomnia/ a fib.      Current Outpatient Medications:     amlodipine-atorvastatin (CADUET) 5-20 mg per tablet, Take 1 tablet by mouth once daily., Disp: 90 tablet, Rfl: 2    apixaban (ELIQUIS) 5 mg Tab, TAKE 1 TABLET(5 MG) BY MOUTH TWICE DAILY, Disp: 180 tablet, Rfl: 1    CEREFOLIN NAC, ALGAL OIL, 6 mg-600 mg- 2 mg-90.314 mg Tab, TAKE 1 TABLET BY MOUTH EVERY DAY, Disp: 90 tablet, Rfl: 3    clobetasoL (TEMOVATE) 0.05 % cream, Apply topically 2 (two) times daily., Disp: , Rfl:     clotrimazole-betamethasone 1-0.05% (LOTRISONE) cream, APPLY TOPICALLY TO THE AFFECTED AREA TWICE DAILY SPARINGLY, Disp: 45 g, Rfl: 3    FLUoxetine 10 MG capsule, Take 1 capsule (10 mg total) by mouth 2 (two) times daily., Disp: 180 capsule, Rfl: 1    FLUoxetine 20 MG capsule, Take 1 capsule (20 mg total) by mouth daily., Disp: 90 capsule, Rfl: 1    fluticasone propionate (FLONASE) 50 mcg/actuation nasal spray, 1 spray (50 mcg total) by Each Nostril route once daily., Disp: 16 g, Rfl: 5    magnesium oxide (MAG-OX) 400 mg (241.3 mg magnesium) tablet, Take 1 tablet (400 mg total) by mouth once daily., Disp: 90 tablet, Rfl: 3    metoprolol tartrate (LOPRESSOR) 100 MG tablet, Take 1 tablet (100 mg total) by mouth 2 (two) times daily., Disp: 180 tablet, Rfl: 3    mupirocin (BACTROBAN) 2 % ointment, Apply topically 3 (three) times daily. Please minimize the use of this antibiotic cream to prevent resistance in future., Disp: 22 g, Rfl: 0    omeprazole (PRILOSEC) 20 MG capsule, Take 1 capsule (20 mg total) by mouth once daily., Disp: 90 capsule, Rfl: 1    potassium chloride (MICRO-K) 10 MEQ CpSR, Take 1 capsule (10 mEq total) by mouth 2 (two) times daily., Disp: 180 capsule, Rfl: 1    triamterene-hydrochlorothiazide 37.5-25 mg (MAXZIDE-25) 37.5-25 mg per tablet, Take 1 tablet by mouth once daily., Disp: 90 tablet, Rfl: 3    predniSONE (DELTASONE) 20 MG tablet, 2 po  qam x 7 days then 1 po qam x 7 days (Patient not taking: Reported on 12/3/2024), Disp: 21 tablet, Rfl: 0    traZODone (DESYREL) 50 MG tablet, Take 1 tablet (50 mg total) by mouth every evening., Disp: 30 tablet, Rfl: 2  No current facility-administered medications for this visit.    This note was generated with the assistance of ambient listening technology. Verbal consent was obtained by the patient and accompanying visitor(s) for the recording of patient appointment to facilitate this note. I attest to having reviewed and edited the generated note for accuracy, though some syntax or spelling errors may persist. Please contact the author of this note for any clarification.      Bassam Lopez

## 2024-12-03 ENCOUNTER — OFFICE VISIT (OUTPATIENT)
Dept: FAMILY MEDICINE | Facility: CLINIC | Age: 64
End: 2024-12-03
Payer: COMMERCIAL

## 2024-12-03 ENCOUNTER — PATIENT MESSAGE (OUTPATIENT)
Dept: FAMILY MEDICINE | Facility: CLINIC | Age: 64
End: 2024-12-03

## 2024-12-03 VITALS
WEIGHT: 222 LBS | BODY MASS INDEX: 30.07 KG/M2 | HEART RATE: 66 BPM | SYSTOLIC BLOOD PRESSURE: 126 MMHG | HEIGHT: 72 IN | DIASTOLIC BLOOD PRESSURE: 81 MMHG

## 2024-12-03 DIAGNOSIS — R79.0 LOW MAGNESIUM LEVEL: ICD-10-CM

## 2024-12-03 DIAGNOSIS — I48.19 PERSISTENT ATRIAL FIBRILLATION: Primary | Chronic | ICD-10-CM

## 2024-12-03 DIAGNOSIS — I10 ESSENTIAL HYPERTENSION: Chronic | ICD-10-CM

## 2024-12-03 DIAGNOSIS — K21.00 GASTROESOPHAGEAL REFLUX DISEASE WITH ESOPHAGITIS WITHOUT HEMORRHAGE: ICD-10-CM

## 2024-12-03 DIAGNOSIS — F45.8 PRURITUS SINE MATERIA: ICD-10-CM

## 2024-12-03 DIAGNOSIS — R73.9 ELEVATED BLOOD SUGAR: ICD-10-CM

## 2024-12-03 DIAGNOSIS — E78.2 MIXED DYSLIPIDEMIA: ICD-10-CM

## 2024-12-03 DIAGNOSIS — Z23 NEED FOR INFLUENZA VACCINATION: ICD-10-CM

## 2024-12-03 DIAGNOSIS — F51.04 PSYCHOPHYSIOLOGICAL INSOMNIA: ICD-10-CM

## 2024-12-03 DIAGNOSIS — Z79.01 ANTICOAGULATED: ICD-10-CM

## 2024-12-03 PROCEDURE — 1160F RVW MEDS BY RX/DR IN RCRD: CPT | Mod: CPTII,S$GLB,, | Performed by: INTERNAL MEDICINE

## 2024-12-03 PROCEDURE — 90471 IMMUNIZATION ADMIN: CPT | Mod: S$GLB,,, | Performed by: INTERNAL MEDICINE

## 2024-12-03 PROCEDURE — 3074F SYST BP LT 130 MM HG: CPT | Mod: CPTII,S$GLB,, | Performed by: INTERNAL MEDICINE

## 2024-12-03 PROCEDURE — 3066F NEPHROPATHY DOC TX: CPT | Mod: CPTII,S$GLB,, | Performed by: INTERNAL MEDICINE

## 2024-12-03 PROCEDURE — 1159F MED LIST DOCD IN RCRD: CPT | Mod: CPTII,S$GLB,, | Performed by: INTERNAL MEDICINE

## 2024-12-03 PROCEDURE — 90656 IIV3 VACC NO PRSV 0.5 ML IM: CPT | Mod: S$GLB,,, | Performed by: INTERNAL MEDICINE

## 2024-12-03 PROCEDURE — 3008F BODY MASS INDEX DOCD: CPT | Mod: CPTII,S$GLB,, | Performed by: INTERNAL MEDICINE

## 2024-12-03 PROCEDURE — 99214 OFFICE O/P EST MOD 30 MIN: CPT | Mod: 25,S$GLB,, | Performed by: INTERNAL MEDICINE

## 2024-12-03 PROCEDURE — 99999 PR PBB SHADOW E&M-EST. PATIENT-LVL III: CPT | Mod: PBBFAC,,, | Performed by: INTERNAL MEDICINE

## 2024-12-03 PROCEDURE — 3079F DIAST BP 80-89 MM HG: CPT | Mod: CPTII,S$GLB,, | Performed by: INTERNAL MEDICINE

## 2024-12-03 PROCEDURE — 3061F NEG MICROALBUMINURIA REV: CPT | Mod: CPTII,S$GLB,, | Performed by: INTERNAL MEDICINE

## 2024-12-03 PROCEDURE — 3044F HG A1C LEVEL LT 7.0%: CPT | Mod: CPTII,S$GLB,, | Performed by: INTERNAL MEDICINE

## 2024-12-03 RX ORDER — TRAZODONE HYDROCHLORIDE 50 MG/1
50 TABLET ORAL NIGHTLY
Qty: 30 TABLET | Refills: 2 | Status: SHIPPED | OUTPATIENT
Start: 2024-12-03 | End: 2025-12-03

## 2024-12-15 ENCOUNTER — PATIENT MESSAGE (OUTPATIENT)
Dept: FAMILY MEDICINE | Facility: CLINIC | Age: 64
End: 2024-12-15
Payer: COMMERCIAL

## 2024-12-24 RX ORDER — POTASSIUM CHLORIDE 750 MG/1
10 CAPSULE, EXTENDED RELEASE ORAL 2 TIMES DAILY
Qty: 180 CAPSULE | Refills: 1 | Status: SHIPPED | OUTPATIENT
Start: 2024-12-24

## 2025-02-05 RX ORDER — TRIAMTERENE/HYDROCHLOROTHIAZID 37.5-25 MG
1 TABLET ORAL DAILY
Qty: 90 TABLET | Refills: 3 | Status: SHIPPED | OUTPATIENT
Start: 2025-02-05

## 2025-02-10 ENCOUNTER — PATIENT MESSAGE (OUTPATIENT)
Dept: FAMILY MEDICINE | Facility: CLINIC | Age: 65
End: 2025-02-10
Payer: COMMERCIAL

## 2025-02-17 ENCOUNTER — PATIENT MESSAGE (OUTPATIENT)
Dept: FAMILY MEDICINE | Facility: CLINIC | Age: 65
End: 2025-02-17

## 2025-02-17 ENCOUNTER — OFFICE VISIT (OUTPATIENT)
Dept: FAMILY MEDICINE | Facility: CLINIC | Age: 65
End: 2025-02-17
Payer: COMMERCIAL

## 2025-02-17 VITALS
SYSTOLIC BLOOD PRESSURE: 133 MMHG | DIASTOLIC BLOOD PRESSURE: 85 MMHG | HEIGHT: 72 IN | HEART RATE: 81 BPM | BODY MASS INDEX: 31.02 KG/M2 | WEIGHT: 229 LBS

## 2025-02-17 DIAGNOSIS — L29.9 PRURITUS: ICD-10-CM

## 2025-02-17 DIAGNOSIS — F19.982 DRUG-INDUCED INSOMNIA: Primary | ICD-10-CM

## 2025-02-17 PROCEDURE — 99213 OFFICE O/P EST LOW 20 MIN: CPT | Mod: S$GLB,,, | Performed by: INTERNAL MEDICINE

## 2025-02-17 NOTE — PROGRESS NOTES
Subjective:       Patient ID: Howard Segura is a 64 y.o. male.    Chief Complaint: Insomnia    HPI    Past Medical History:   Diagnosis Date    A-fib     Acute vasomotor rhinitis 03/20/2019    Chronic maxillary sinusitis 03/20/2019    Closed fracture of neck of right femur 12/22/2019    Current smoker 11/08/2017    Duodenal ulcer     Fracture of femoral neck, right 12/22/2019    GERD (gastroesophageal reflux disease)     Hay fever 11/08/2017    History of colonoscopy 03/26/2024         Dr. Víctor Davison.  To be repeated in 2034.     Hypertension     Hypokalemia 11/08/2017    Patient's potassium level is 3.1. He is on diuretics.Did not take K pills beckevin vázquez bothered hsi stomac. New pills from mail order are tolerable.    Hypophosphatemia 12/23/2019    Inguinal hernia     Kidney stone     Neck pain, bilateral 05/08/2018    PAC (premature atrial contraction)     Rash and nonspecific skin eruption 05/04/2020    Rhinitis medicamentosa 03/20/2019    Skin cancer     Squamous cell carcinoma of skin      Social History[1]  Past Surgical History:   Procedure Laterality Date    EXCISION OF SQUAMOUS CELL CARCINOMA      Head    KNEE SURGERY      OPEN REDUCTION AND INTERNAL FIXATION (ORIF) OF FRACTURE OF TIBIAL PLATEAU Left     OPEN REDUCTION AND INTERNAL FIXATION (ORIF) OF INJURY OF HIP Right 12/22/2019    Procedure: ORIF, HIP;  Surgeon: Todd Andrews Jr., MD;  Location: [facility];  Service: Orthopedics;  Laterality: Right;    QUADRICEPS TENDON REPAIR Left     ROTATOR CUFF REPAIR Left 2012    ROTATOR CUFF REPAIR Right 1994    SINUS SURGERY      TREATMENT OF CARDIAC ARRHYTHMIA N/A 6/5/2020    Procedure: CARDIOVERSION;  Surgeon: James Richter MD;  Location: [facility] CATH[facility]EP LAB;  Service: Cardiology;  Laterality: N/A;     Family History   Problem Relation Name Age of Onset    Hepatitis Mother Amee Segura         Treated with Harvoni    Heart disease Father         Review of Systems      Objective:      Blood  pressure 133/85, pulse 81, height 6' (1.829 m), weight 103.9 kg (229 lb). Body mass index is 31.06 kg/m².  Physical Exam      Assessment:       No visits with results within 3 Month(s) from this visit.   Latest known visit with results is:   Lab Visit on 11/15/2024   Component Date Value Ref Range Status    PSA, Screen 11/15/2024 1.55  0.00 - 4.00 ng/mL Final    Cholesterol 11/15/2024 167  120 - 199 mg/dL Final    Triglycerides 11/15/2024 275 (H)  30 - 150 mg/dL Final    HDL 11/15/2024 42  40 - 75 mg/dL Final    LDL Cholesterol 11/15/2024 70.0  63.0 - 159.0 mg/dL Final    HDL/Cholesterol Ratio 11/15/2024 25.1  20.0 - 50.0 % Final    Total Cholesterol/HDL Ratio 11/15/2024 4.0  2.0 - 5.0 Final    Non-HDL Cholesterol 11/15/2024 125  mg/dL Final    Sodium 11/15/2024 140  136 - 145 mmol/L Final    Potassium 11/15/2024 4.0  3.5 - 5.1 mmol/L Final    Chloride 11/15/2024 101  95 - 110 mmol/L Final    CO2 11/15/2024 30 (H)  23 - 29 mmol/L Final    Glucose 11/15/2024 122 (H)  70 - 110 mg/dL Final    BUN 11/15/2024 14  8 - 23 mg/dL Final    Creatinine 11/15/2024 1.0  0.5 - 1.4 mg/dL Final    Calcium 11/15/2024 9.6  8.7 - 10.5 mg/dL Final    Total Protein 11/15/2024 7.0  6.0 - 8.4 g/dL Final    Albumin 11/15/2024 4.7  3.5 - 5.2 g/dL Final    Total Bilirubin 11/15/2024 1.1 (H)  0.1 - 1.0 mg/dL Final    Alkaline Phosphatase 11/15/2024 62  55 - 135 U/L Final    AST 11/15/2024 20  10 - 40 U/L Final    ALT 11/15/2024 24  10 - 44 U/L Final    eGFR 11/15/2024 >60.0  >60 mL/min/1.73 m^2 Final    Anion Gap 11/15/2024 9  8 - 16 mmol/L Final    WBC 11/15/2024 7.11  3.90 - 12.70 K/uL Final    RBC 11/15/2024 4.80  4.60 - 6.20 M/uL Final    Hemoglobin 11/15/2024 14.6  14.0 - 18.0 g/dL Final    Hematocrit 11/15/2024 42.6  40.0 - 54.0 % Final    MCV 11/15/2024 89  82 - 98 fL Final    MCH 11/15/2024 30.4  27.0 - 31.0 pg Final    MCHC 11/15/2024 34.3  32.0 - 36.0 g/dL Final    RDW 11/15/2024 13.4  11.5 - 14.5 % Final    Platelets 11/15/2024 180   150 - 450 K/uL Final    MPV 11/15/2024 9.4  9.2 - 12.9 fL Final    Immature Granulocytes 11/15/2024 0.6 (H)  0.0 - 0.5 % Final    Gran # (ANC) 11/15/2024 3.9  1.8 - 7.7 K/uL Final    Immature Grans (Abs) 11/15/2024 0.04  0.00 - 0.04 K/uL Final    Lymph # 11/15/2024 1.7  1.0 - 4.8 K/uL Final    Mono # 11/15/2024 0.8  0.3 - 1.0 K/uL Final    Eos # 11/15/2024 0.5  0.0 - 0.5 K/uL Final    Baso # 11/15/2024 0.07  0.00 - 0.20 K/uL Final    nRBC 11/15/2024 0  0 /100 WBC Final    Gran % 11/15/2024 55.1  38.0 - 73.0 % Final    Lymph % 11/15/2024 24.5  18.0 - 48.0 % Final    Mono % 11/15/2024 11.8  4.0 - 15.0 % Final    Eosinophil % 11/15/2024 7.0  0.0 - 8.0 % Final    Basophil % 11/15/2024 1.0  0.0 - 1.9 % Final    Differential Method 11/15/2024 Automated   Final    Microalbumin, Urine 11/15/2024 26.2 (H)  <19.9 ug/mL Final    Creatinine, Urine 11/15/2024 150.0  23.0 - 375.0 mg/dL Final    Microalb/Creat Ratio 11/15/2024 17.5  0.0 - 30.0 ug/mg Final    Hemoglobin A1C 11/15/2024 6.0  4.5 - 6.2 % Final    Estimated Avg Glucose 11/15/2024 126  68 - 131 mg/dL Final       {There are no diagnoses linked to this encounter. (Refresh or delete this SmartLink)}         Plan:   There are no diagnoses linked to this encounter.    No follow-ups on file.    Current Medications[2]    Bassam Jessica           [1]   Social History  Socioeconomic History    Marital status:      Spouse name: Candie Segura    Number of children: 0   Occupational History    Occupation: [employer]   Tobacco Use    Smoking status: Former     Current packs/day: 0.00     Average packs/day: 0.5 packs/day for 38.6 years (19.3 ttl pk-yrs)     Types: Cigarettes     Start date: 1980     Quit date: 2018     Years since quittin.5    Smokeless tobacco: Never    Tobacco comments:     Counselled   Substance and Sexual Activity    Alcohol use: Not Currently    Drug use: No    Sexual activity: Yes     Partners: Female     Social Drivers  of Health     Financial Resource Strain: Low Risk  (5/13/2023)    Overall Financial Resource Strain (CARDIA)     Difficulty of Paying Living Expenses: Not very hard   Food Insecurity: No Food Insecurity (5/13/2023)    Hunger Vital Sign     Worried About Running Out of Food in the Last Year: Never true     Ran Out of Food in the Last Year: Never true   Transportation Needs: No Transportation Needs (5/13/2023)    PRAPARE - Transportation     Lack of Transportation (Medical): No     Lack of Transportation (Non-Medical): No   Physical Activity: Inactive (5/13/2023)    Exercise Vital Sign     Days of Exercise per Week: 0 days     Minutes of Exercise per Session: 0 min   Stress: No Stress Concern Present (5/13/2023)    [employer] [place] [employer] - Occupational Stress Questionnaire     Feeling of Stress : Only a little   Housing Stability: Low Risk  (5/13/2023)    Housing Stability Vital Sign     Unable to Pay for Housing in the Last Year: No     Number of Places Lived in the Last Year: 1     Unstable Housing in the Last Year: No   [2]   Current Outpatient Medications:     amlodipine-atorvastatin (CADUET) 5-20 mg per tablet, Take 1 tablet by mouth once daily., Disp: 90 tablet, Rfl: 2    apixaban (ELIQUIS) 5 mg Tab, TAKE 1 TABLET(5 MG) BY MOUTH TWICE DAILY, Disp: 180 tablet, Rfl: 1    CEREFOLIN NAC, ALGAL OIL, 6 mg-600 mg- 2 mg-90.314 mg Tab, TAKE 1 TABLET BY MOUTH EVERY DAY, Disp: 90 tablet, Rfl: 3    clobetasoL (TEMOVATE) 0.05 % cream, Apply topically 2 (two) times daily., Disp: , Rfl:     clotrimazole-betamethasone 1-0.05% (LOTRISONE) cream, APPLY TOPICALLY TO THE AFFECTED AREA TWICE DAILY SPARINGLY, Disp: 45 g, Rfl: 3    FLUoxetine 10 MG capsule, Take 1 capsule (10 mg total) by mouth 2 (two) times daily., Disp: 180 capsule, Rfl: 1    FLUoxetine 20 MG capsule, Take 1 capsule (20 mg total) by mouth daily., Disp: 90 capsule, Rfl: 1    fluticasone propionate (FLONASE) 50 mcg/actuation nasal spray, 1 spray (50  mcg total) by Each Nostril route once daily., Disp: 16 g, Rfl: 5    magnesium oxide (MAG-OX) 400 mg (241.3 mg magnesium) tablet, Take 1 tablet (400 mg total) by mouth once daily., Disp: 90 tablet, Rfl: 3    metoprolol tartrate (LOPRESSOR) 100 MG tablet, Take 1 tablet (100 mg total) by mouth 2 (two) times daily., Disp: 180 tablet, Rfl: 3    mupirocin (BACTROBAN) 2 % ointment, Apply topically 3 (three) times daily. Please minimize the use of this antibiotic cream to prevent resistance in future., Disp: 22 g, Rfl: 0    omeprazole (PRILOSEC) 20 MG capsule, Take 1 capsule (20 mg total) by mouth once daily., Disp: 90 capsule, Rfl: 1    potassium chloride (MICRO-K) 10 MEQ CpSR, Take 1 capsule (10 mEq total) by mouth 2 (two) times daily., Disp: 180 capsule, Rfl: 1    triamterene-hydrochlorothiazide 37.5-25 mg (MAXZIDE-25) 37.5-25 mg per tablet, Take 1 tablet by mouth once daily., Disp: 90 tablet, Rfl: 3    predniSONE (DELTASONE) 20 MG tablet, 2 po qam x 7 days then 1 po qam x 7 days (Patient not taking: Reported on 2/17/2025), Disp: 21 tablet, Rfl: 0

## 2025-02-17 NOTE — PROGRESS NOTES
Somewhat is it me the be and orangeSubjective:       Patient ID: Howard Segura is a 64 y.o. male.    Chief Complaint: Insomnia, Itching, Hypertension, and Hyperlipidemia    History of Present Illness    CHIEF COMPLAINT:  Howard presents with concerns about chronic itching and difficulty sleeping, potentially related to medication side effects.    HPI:  Howard reports chronic itching for approximately 4 years. The cause is unknown, with potential factors including medication side effects (possibly from Eliquis), COVID-19 infection, or COVID-19 vaccinations. He describes his skin as very dry, which he believes contributes to the itching. He has tried stopping various medications for 10-day periods to identify the cause, but this did not alleviate the symptoms.    He is currently taking fluoxetine (20 mg in the morning and 10 mg at night) prescribed by an allergist to help manage the itching. While the medication does not necessarily reduce the itching, it helps him tolerate it better. He reports significant sleep disturbances, which may have started around the same time as beginning fluoxetine treatment. He has difficulty falling asleep and reports going days without sleep. When he does fall asleep, he describes it as interrupted. His wife notes that he talks in his sleep, moves his legs at night, and has been snoring more than usual.    He has undergone extensive testing to rule out underlying causes, including a chest XR, colonoscopy, skin biopsies, and various labs, all reported as normal. He has consulted with multiple specialists, including a dermatologist (Dr. Joyce) and an allergist (Dr. Ram). He also mentions having multiple lipomas throughout his body, which he has had for years and run in his family.    He reports that when he stays awake very late (around 4 AM) and drinks coffee, he is able to fall asleep, contrary to the expected effect of caffeine. He denies regular alcohol consumption, noting that  when he does have a glass of wine, it tends to keep both him and his wife awake.    He denies any current stressors, worries, or PTSD symptoms. He denies having any knots or growing lymph nodes in his body.    MEDICATIONS:  Howard is on Fluoxetine 20 mg in the morning and 10 mg at night for itching and mood, which may be causing sleep issues. He is also on Eliquis for stroke prevention, Omeprazole, and Trimedrine (Hydrochlorothiazide) as a water pill. His Fluoxetine dosage was increased from 10 mg twice daily to 20 mg twice daily, then decreased to 30 mg total daily due to sleep issues. In the past, he discontinued various medications for 10 days each to determine the cause of itching, but no improvement was noted.    MEDICAL HISTORY:  Howard has a history of multiple COVID-19 infections. He had a mild case of anemia in the past, which has since corrected. He also has multiple, long-standing lipomas. Howard has received the COVID-19 vaccine.    FAMILY HISTORY:  Family history is significant for grandmother having lipomas.    SURGICAL HISTORY:  Howard underwent a colonoscopy to rule out cancer, though the date was not specified. He has also had multiple lipoma removals, with dates not specified.    TEST RESULTS:  Howard's liver and kidney function te  sts were checked recently, with results being okay. Blood counts were checked 3 months ago and were okay, showing no evidence of abnormality. A cholesterol panel from November 24 showed a total cholesterol of 167, LDL of 70, and high triglycerides. His recent PSA test was 1.55, which is normal. Howard's blood sugar has been high for many years.    IMAGING:  A chest XR was completed recently, showing no evidence of underlying cancer.    SOCIAL HISTORY:  Smoking: Former smoker, quit in 2018 Occupation: Retired  Spiritism: [employer]    Marital status:     ROS:  General: -fatigue, -weight loss  Respiratory: -cough  Musculoskeletal: -muscle pain  Skin: +itching  Psychiatric:  -anxiety, +sleep difficulty         Past Medical History:   Diagnosis Date    A-fib     Acute vasomotor rhinitis 03/20/2019    Chronic maxillary sinusitis 03/20/2019    Closed fracture of neck of right femur 12/22/2019    Current smoker 11/08/2017    Duodenal ulcer     Fracture of femoral neck, right 12/22/2019    GERD (gastroesophageal reflux disease)     Hay fever 11/08/2017    History of colonoscopy 03/26/2024         Dr. Víctor Davison.  To be repeated in 2034.     Hypertension     Hypokalemia 11/08/2017    Patient's potassium level is 3.1. He is on diuretics.Did not take K pills meli vázquez bothered hsi stomac. New pills from mail order are tolerable.    Hypophosphatemia 12/23/2019    Inguinal hernia     Kidney stone     Neck pain, bilateral 05/08/2018    PAC (premature atrial contraction)     Rash and nonspecific skin eruption 05/04/2020    Rhinitis medicamentosa 03/20/2019    Skin cancer     Squamous cell carcinoma of skin      Social History[1]  Past Surgical History:   Procedure Laterality Date    EXCISION OF SQUAMOUS CELL CARCINOMA      Head    KNEE SURGERY      OPEN REDUCTION AND INTERNAL FIXATION (ORIF) OF FRACTURE OF TIBIAL PLATEAU Left     OPEN REDUCTION AND INTERNAL FIXATION (ORIF) OF INJURY OF HIP Right 12/22/2019    Procedure: ORIF, HIP;  Surgeon: Todd Andrews Jr., MD;  Location: [facility];  Service: Orthopedics;  Laterality: Right;    QUADRICEPS TENDON REPAIR Left     ROTATOR CUFF REPAIR Left 2012    ROTATOR CUFF REPAIR Right 1994    SINUS SURGERY      TREATMENT OF CARDIAC ARRHYTHMIA N/A 6/5/2020    Procedure: CARDIOVERSION;  Surgeon: James Richter MD;  Location: [facility] CATH/EP LAB;  Service: Cardiology;  Laterality: N/A;     Family History   Problem Relation Name Age of Onset    Hepatitis Mother Amee Segura         Treated with Harvoni    Heart disease Father         Objective:      Physical Exam  Blood pressure 133/85, pulse 81, height 6' (1.829 m), weight 103.9 kg (229 lb). Body  mass index is 31.06 kg/m².  Physical Exam    General: No acute distress. Well-developed. Well-nourished.  Eyes: EOMI. Sclerae anicteric.  HENT: Normocephalic. Atraumatic. Nares patent. Moist oral mucosa.  .  Cardiovascular: Regular rate. Regular rhythm. No murmurs. No rubs. No gallops. Normal S1, S2.  Respiratory: Normal respiratory effort. Clear to auscultation bilaterally. No rales. No rhonchi. No wheezing.  Abdomen: Soft. Non-tender. Non-distended. Normoactive bowel sounds.  Musculoskeletal: No  obvious deformity.  Extremities: No lower extremity edema.  Neurological: Alert & orientedNo slurred speech. Normal gait.  Psychiatric: Generally positive and affable.  Skin: Warm. Very dry skin. No rash.   Changes of keratitis on the left arm.             Assessment:       No visits with results within 3 Month(s) from this visit.   Latest known visit with results is:   Lab Visit on 11/15/2024   Component Date Value Ref Range Status    PSA, Screen 11/15/2024 1.55  0.00 - 4.00 ng/mL Final    Cholesterol 11/15/2024 167  120 - 199 mg/dL Final    Triglycerides 11/15/2024 275 (H)  30 - 150 mg/dL Final    HDL 11/15/2024 42  40 - 75 mg/dL Final    LDL Cholesterol 11/15/2024 70.0  63.0 - 159.0 mg/dL Final    HDL/Cholesterol Ratio 11/15/2024 25.1  20.0 - 50.0 % Final    Total Cholesterol/HDL Ratio 11/15/2024 4.0  2.0 - 5.0 Final    Non-HDL Cholesterol 11/15/2024 125  mg/dL Final    Sodium 11/15/2024 140  136 - 145 mmol/L Final    Potassium 11/15/2024 4.0  3.5 - 5.1 mmol/L Final    Chloride 11/15/2024 101  95 - 110 mmol/L Final    CO2 11/15/2024 30 (H)  23 - 29 mmol/L Final    Glucose 11/15/2024 122 (H)  70 - 110 mg/dL Final    BUN 11/15/2024 14  8 - 23 mg/dL Final    Creatinine 11/15/2024 1.0  0.5 - 1.4 mg/dL Final    Calcium 11/15/2024 9.6  8.7 - 10.5 mg/dL Final    Total Protein 11/15/2024 7.0  6.0 - 8.4 g/dL Final    Albumin 11/15/2024 4.7  3.5 - 5.2 g/dL Final    Total Bilirubin 11/15/2024 1.1 (H)  0.1 - 1.0 mg/dL Final     Alkaline Phosphatase 11/15/2024 62  55 - 135 U/L Final    AST 11/15/2024 20  10 - 40 U/L Final    ALT 11/15/2024 24  10 - 44 U/L Final    eGFR 11/15/2024 >60.0  >60 mL/min/1.73 m^2 Final    Anion Gap 11/15/2024 9  8 - 16 mmol/L Final    WBC 11/15/2024 7.11  3.90 - 12.70 K/uL Final    RBC 11/15/2024 4.80  4.60 - 6.20 M/uL Final    Hemoglobin 11/15/2024 14.6  14.0 - 18.0 g/dL Final    Hematocrit 11/15/2024 42.6  40.0 - 54.0 % Final    MCV 11/15/2024 89  82 - 98 fL Final    MCH 11/15/2024 30.4  27.0 - 31.0 pg Final    MCHC 11/15/2024 34.3  32.0 - 36.0 g/dL Final    RDW 11/15/2024 13.4  11.5 - 14.5 % Final    Platelets 11/15/2024 180  150 - 450 K/uL Final    MPV 11/15/2024 9.4  9.2 - 12.9 fL Final    Immature Granulocytes 11/15/2024 0.6 (H)  0.0 - 0.5 % Final    Gran # (ANC) 11/15/2024 3.9  1.8 - 7.7 K/uL Final    Immature Grans (Abs) 11/15/2024 0.04  0.00 - 0.04 K/uL Final    Lymph # 11/15/2024 1.7  1.0 - 4.8 K/uL Final    Mono # 11/15/2024 0.8  0.3 - 1.0 K/uL Final    Eos # 11/15/2024 0.5  0.0 - 0.5 K/uL Final    Baso # 11/15/2024 0.07  0.00 - 0.20 K/uL Final    nRBC 11/15/2024 0  0 /100 WBC Final    Gran % 11/15/2024 55.1  38.0 - 73.0 % Final    Lymph % 11/15/2024 24.5  18.0 - 48.0 % Final    Mono % 11/15/2024 11.8  4.0 - 15.0 % Final    Eosinophil % 11/15/2024 7.0  0.0 - 8.0 % Final    Basophil % 11/15/2024 1.0  0.0 - 1.9 % Final    Differential Method 11/15/2024 Automated   Final    Microalbumin, Urine 11/15/2024 26.2 (H)  <19.9 ug/mL Final    Creatinine, Urine 11/15/2024 150.0  23.0 - 375.0 mg/dL Final    Microalb/Creat Ratio 11/15/2024 17.5  0.0 - 30.0 ug/mg Final    Hemoglobin A1C 11/15/2024 6.0  4.5 - 6.2 % Final    Estimated Avg Glucose 11/15/2024 126  68 - 131 mg/dL Final       Assessment & Plan    IMPRESSION:  - Considered fluoxetine as potential cause of patient's sleep issues  - Evaluated potential causes of chronic itching, including medications and underlying conditions  - Reviewed recent lab results,  which were largely normal  - Considered possibility of sleep apnea due to reported snoring and jerky movements during sleep  - Considered potential connection between COVID-19 or vaccine and patient's symptoms    I48.19 PERSISTENT ATRIAL FIBRILLATION:  - Continued Eliquis for atrial fibrillation management.  - Discussed the possibility of temporarily discontinuing Eliquis to assess its effects on itching, while cautioning about the increased stroke risk associated with discontinuation.  - Advised patient to closely monitor any changes in symptoms if Eliquis is temporarily stopped.    L29.9 PRURITUS, UNSPECIFIED:  - Noted patient's ongoing itchiness persisting for 4 years, potentially exacerbated by dry skin.  - Multiple tests (chest XR, colonoscopy, labs, and skin biopsies) conducted to rule out underlying causes, with no specific cause identified.  - Planned to review all potential causes, including medication side effects and post-COVID syndrome.  - Continued fluoxetine (20mg morning, 10mg night) to manage itching symptoms.  - Recommend a tapering protocol for fluoxetine to evaluate its effect on sleep and itching.    G47.01 INSOMNIA DUE TO MEDICAL CONDITION:  - Educated patient about natural sleep-wake cycle and importance of proper sleep hygiene.  - Noted severe sleep issues, including difficulty falling and staying asleep, which started around the same time as fluoxetine treatment.  - Observed jerky movements and talking during sleep, potentially contributing to sleep disturbances.  - Considered fluoxetine may be contributing to sleep issues.  - Prescribed fluoxetine tapering protocol: Week 1 - 20 mg morning only; Week 2 - 10 mg morning only; Week 3 - 10 mg Monday, Wednesday, and Friday mornings only; then discontinue.  - Requested patient to send a message after 1 week of tapering to report sleep changes.  - Scheduled follow-up through patient portal in 1 week to assess effects.  - Noted previous attempt with  trazodone for sleep was unsuccessful due to side effects.    R73.03 PREDIABETES:  - Noted patient's blood sugar has been elevated for many years.  - Commended patient for making dietary changes, including reducing beef consumption and increasing fish intake.    E78.1 PURE HYPERGLYCERIDEMIA:  - Noted patient's elevated triglycerides, while total cholesterol and LDL cholesterol are within normal range.    Z86.16 PERSONAL HISTORY OF COVID-19:  - Documented patient reports having had COVID-19 multiple times.  - Considered possibility that COVID-19 or its vaccine might be related to ongoing symptoms.    Z87.891 PERSONAL HISTORY OF NICOTINE DEPENDENCE:  - Noted patient is a former smoker who quit in 2018.    D17.9 BENIGN LIPOMATOUS NEOPLASM, UNSPECIFIED:  - Provided information on von Recklinghausen disease and its characteristics.  - Noted patient reports having multiple lipomas all over his body for years, with some removed in the past.  - Considered possibility of von Recklinghausen's disease but concluded it's unlikely based on patient's presentation.    L85.3 XEROSIS CUTIS:  - Advised continued use of moisturizing lotion for dry skin, believed to be contributing to the itching.    Z79.01 LONG TERM (CURRENT) USE OF ANTICOAGULANTS:  - Considered p  ossibility of Eliquis (anticoagulant) causing itching, but noted it's not typically known to cause this side effect.    R06.83 SNORING:  - Noted patient's wife reports increased snoring recently.    G47.30 SLEEP APNEA, UNSPECIFIED:  - Inquired about signs of sleep apnea.  - Documented increased snoring reported by patient's wife, but no observed episodes of stopping breathing during sleep.         Plan:   Drug-induced insomnia  Comments:  Insomnia which temporarily coincided with introduction of fluoxetine.Plan  to gradually wean off with elimination of nighttime dosage to begin with for 1 week.    Pruritus  Comments:  the exact cause of pruritus is uncertain.  Dry skin.  He  is prescribed fluoxetine empirically which might be causing him some side effects.       Patient's recent insomnia has been noted as he struggles to  sleep till about 4:00 a.m..  Coffee at that time works opposite for him and lulls him to sleep.  If he takes coffee at 10:00 p.m. or 11:00 p.m. it will not little lull him into sleep.    Tapering protocol for fluoxetine given and see if stopping this medication includes a sleep naturally   He did indicate that he is not interested in further medications to help him sleep   Potential role of SSRIs causing insomnia has been noted.    If it does well with the elimination of nighttime dosage for sleep, then he may continue the daytime dosage which is 20 mg.  If not then will taper off the remaining fluoxetine and look for alternative strategies to deal with the pruritus.    He has wife does admit that with the administration of fluoxetine he is more my load and affable though.  Follow up in about 6 weeks (around 4/2/2025) for Hypertension/lipids.      There is a feeling that perhaps the introduction of Prozac has caused him insomnia especially the nighttime dosage.    Please note that the Prozac was interviewed for his unidentified pruritus.    Current Medications[2]    This note was generated with the assistance of ambient listening technology. Verbal consent was obtained by the patient and accompanying visitor(s) for the recording of patient appointment to facilitate this note. I attest to having reviewed and edited the generated note for accuracy, though some syntax or spelling errors may persist. Please contact the author of this note for any clarification.      Bassam Lopez           [1]   Social History  Socioeconomic History    Marital status:      Spouse name: Candie Segura    Number of children: 0   Occupational History    Occupation: [employer]   Tobacco Use    Smoking status: Former     Current packs/day: 0.00     Average  packs/day: 0.5 packs/day for 38.6 years (19.3 ttl pk-yrs)     Types: Cigarettes     Start date: 1980     Quit date: 2018     Years since quittin.5    Smokeless tobacco: Never    Tobacco comments:     Counselled   Substance and Sexual Activity    Alcohol use: Not Currently    Drug use: No    Sexual activity: Yes     Partners: Female     Social Drivers of Health     Financial Resource Strain: Low Risk  (2023)    Overall Financial Resource Strain (CARDIA)     Difficulty of Paying Living Expenses: Not very hard   Food Insecurity: No Food Insecurity (2023)    Hunger Vital Sign     Worried About Running Out of Food in the Last Year: Never true     Ran Out of Food in the Last Year: Never true   Transportation Needs: No Transportation Needs (2023)    PRAPARE - Transportation     Lack of Transportation (Medical): No     Lack of Transportation (Non-Medical): No   Physical Activity: Inactive (2023)    Exercise Vital Sign     Days of Exercise per Week: 0 days     Minutes of Exercise per Session: 0 min   Stress: No Stress Concern Present (2023)    [employer] - Occupational Stress Questionnaire     Feeling of Stress : Only a little   Housing Stability: Low Risk  (2023)    Housing Stability Vital Sign     Unable to Pay for Housing in the Last Year: No     Number of Places Lived in the Last Year: 1     Unstable Housing in the Last Year: No   [2]   Current Outpatient Medications:     amlodipine-atorvastatin (CADUET) 5-20 mg per tablet, Take 1 tablet by mouth once daily., Disp: 90 tablet, Rfl: 2    apixaban (ELIQUIS) 5 mg Tab, TAKE 1 TABLET(5 MG) BY MOUTH TWICE DAILY, Disp: 180 tablet, Rfl: 1    CEREFOLIN NAC, ALGAL OIL, 6 mg-600 mg- 2 mg-90.314 mg Tab, TAKE 1 TABLET BY MOUTH EVERY DAY, Disp: 90 tablet, Rfl: 3    clobetasoL (TEMOVATE) 0.05 % cream, Apply topically 2 (two) times daily., Disp: , Rfl:     clotrimazole-betamethasone 1-0.05% (LOTRISONE) cream, APPLY  TOPICALLY TO THE AFFECTED AREA TWICE DAILY SPARINGLY, Disp: 45 g, Rfl: 3    FLUoxetine 10 MG capsule, Take 1 capsule (10 mg total) by mouth 2 (two) times daily., Disp: 180 capsule, Rfl: 1    FLUoxetine 20 MG capsule, Take 1 capsule (20 mg total) by mouth daily., Disp: 90 capsule, Rfl: 1    fluticasone propionate (FLONASE) 50 mcg/actuation nasal spray, 1 spray (50 mcg total) by Each Nostril route once daily., Disp: 16 g, Rfl: 5    magnesium oxide (MAG-OX) 400 mg (241.3 mg magnesium) tablet, Take 1 tablet (400 mg total) by mouth once daily., Disp: 90 tablet, Rfl: 3    metoprolol tartrate (LOPRESSOR) 100 MG tablet, Take 1 tablet (100 mg total) by mouth 2 (two) times daily., Disp: 180 tablet, Rfl: 3    mupirocin (BACTROBAN) 2 % ointment, Apply topically 3 (three) times daily. Please minimize the use of this antibiotic cream to prevent resistance in future., Disp: 22 g, Rfl: 0    omeprazole (PRILOSEC) 20 MG capsule, Take 1 capsule (20 mg total) by mouth once daily., Disp: 90 capsule, Rfl: 1    potassium chloride (MICRO-K) 10 MEQ CpSR, Take 1 capsule (10 mEq total) by mouth 2 (two) times daily., Disp: 180 capsule, Rfl: 1    triamterene-hydrochlorothiazide 37.5-25 mg (MAXZIDE-25) 37.5-25 mg per tablet, Take 1 tablet by mouth once daily., Disp: 90 tablet, Rfl: 3    predniSONE (DELTASONE) 20 MG tablet, 2 po qam x 7 days then 1 po qam x 7 days (Patient not taking: Reported on 2/17/2025), Disp: 21 tablet, Rfl: 0

## 2025-03-15 DIAGNOSIS — I10 ESSENTIAL HYPERTENSION: ICD-10-CM

## 2025-03-15 DIAGNOSIS — I48.19 PERSISTENT ATRIAL FIBRILLATION: Chronic | ICD-10-CM

## 2025-03-15 DIAGNOSIS — E78.2 MIXED DYSLIPIDEMIA: ICD-10-CM

## 2025-03-16 RX ORDER — AMLODIPINE BESYLATE AND ATORVASTATIN CALCIUM 5; 20 MG/1; MG/1
1 TABLET, FILM COATED ORAL DAILY
Qty: 90 TABLET | Refills: 2 | Status: SHIPPED | OUTPATIENT
Start: 2025-03-16

## 2025-03-16 RX ORDER — METOPROLOL TARTRATE 100 MG/1
100 TABLET ORAL 2 TIMES DAILY
Qty: 180 TABLET | Refills: 3 | Status: SHIPPED | OUTPATIENT
Start: 2025-03-16

## 2025-03-16 NOTE — TELEPHONE ENCOUNTER
Care Due:                  Date            Visit Type   Department     Provider  --------------------------------------------------------------------------------                                EP - [facility] [address]  Last Visit: 02-      CARE ([facility])   [facility] Karson Leona EP - [facility] [address]  Next Visit: 04-      [facility] ([facility])   [facility] Karson  [facility]                                                            Last  Test          Frequency    Reason                     Performed    Due Date  --------------------------------------------------------------------------------    Mg Level....  12 months..  magnesium................  Not Found    Overdue    [employer] Embedded Care Due Messages. Reference number: [id number].   3/16/2025 3:00:11 PM CDT

## 2025-03-16 NOTE — TELEPHONE ENCOUNTER
Refill Decision Note   Howard Segura  is requesting a refill authorization.  Brief Assessment and Rationale for Refill:  Approve     Medication Therapy Plan:         Comments:     Note composed:3:02 PM 03/16/2025             Appointments     Last Visit   2/17/2025 Bassam Lopez MD   Next Visit   4/2/2025 Bassam Lopez MD

## 2025-05-04 DIAGNOSIS — I48.0 PAROXYSMAL ATRIAL FIBRILLATION: ICD-10-CM

## 2025-05-08 ENCOUNTER — LAB VISIT (OUTPATIENT)
Dept: LAB | Facility: HOSPITAL | Age: 65
End: 2025-05-08
Attending: PHYSICIAN ASSISTANT
Payer: COMMERCIAL

## 2025-05-08 DIAGNOSIS — Z51.81 ENCOUNTER FOR THERAPEUTIC DRUG MONITORING: ICD-10-CM

## 2025-05-08 DIAGNOSIS — E78.5 HYPERLIPIDEMIA, UNSPECIFIED HYPERLIPIDEMIA TYPE: Primary | ICD-10-CM

## 2025-05-08 DIAGNOSIS — I48.11 LONGSTANDING PERSISTENT ATRIAL FIBRILLATION: ICD-10-CM

## 2025-05-08 DIAGNOSIS — R93.1 ABNORMAL ECHOCARDIOGRAM: ICD-10-CM

## 2025-05-08 LAB
ALBUMIN SERPL-MCNC: 4.4 G/DL (ref 3.5–5.2)
ALP SERPL-CCNC: 62 UNIT/L (ref 55–135)
ALT SERPL-CCNC: 20 UNIT/L (ref 10–44)
ANION GAP (SMH): 8 MMOL/L (ref 8–16)
AST SERPL-CCNC: 18 UNIT/L (ref 10–40)
BILIRUB SERPL-MCNC: 1 MG/DL (ref 0.1–1)
BUN SERPL-MCNC: 15 MG/DL (ref 8–23)
CALCIUM SERPL-MCNC: 9.5 MG/DL (ref 8.7–10.5)
CHLORIDE SERPL-SCNC: 101 MMOL/L (ref 95–110)
CHOLEST SERPL-MCNC: 162 MG/DL (ref 120–199)
CHOLEST/HDLC SERPL: 4.5 {RATIO} (ref 2–5)
CO2 SERPL-SCNC: 30 MMOL/L (ref 23–29)
CREAT SERPL-MCNC: 1 MG/DL (ref 0.5–1.4)
ERYTHROCYTE [DISTWIDTH] IN BLOOD BY AUTOMATED COUNT: 13.3 % (ref 11.5–14.5)
GFR SERPLBLD CREATININE-BSD FMLA CKD-EPI: >60 ML/MIN/1.73/M2
GLUCOSE SERPL-MCNC: 116 MG/DL (ref 70–110)
HCT VFR BLD AUTO: 41.8 % (ref 40–54)
HDLC SERPL-MCNC: 36 MG/DL (ref 40–75)
HDLC SERPL: 22.2 % (ref 20–50)
HGB BLD-MCNC: 14.9 GM/DL (ref 14–18)
LDLC SERPL CALC-MCNC: 53 MG/DL (ref 63–159)
MCH RBC QN AUTO: 30.2 PG (ref 27–31)
MCHC RBC AUTO-ENTMCNC: 35.6 G/DL (ref 32–36)
MCV RBC AUTO: 85 FL (ref 82–98)
NONHDLC SERPL-MCNC: 126 MG/DL
PLATELET # BLD AUTO: 159 K/UL (ref 150–450)
PMV BLD AUTO: 9.3 FL (ref 9.2–12.9)
POTASSIUM SERPL-SCNC: 3.9 MMOL/L (ref 3.5–5.1)
PROT SERPL-MCNC: 6.8 GM/DL (ref 6–8.4)
RBC # BLD AUTO: 4.94 M/UL (ref 4.6–6.2)
SODIUM SERPL-SCNC: 139 MMOL/L (ref 136–145)
TRIGL SERPL-MCNC: 365 MG/DL (ref 30–150)
WBC # BLD AUTO: 6.38 K/UL (ref 3.9–12.7)

## 2025-05-08 PROCEDURE — 36415 COLL VENOUS BLD VENIPUNCTURE: CPT

## 2025-05-08 PROCEDURE — 85027 COMPLETE CBC AUTOMATED: CPT

## 2025-05-08 PROCEDURE — 82565 ASSAY OF CREATININE: CPT

## 2025-05-08 PROCEDURE — 82465 ASSAY BLD/SERUM CHOLESTEROL: CPT

## 2025-06-15 RX ORDER — POTASSIUM CHLORIDE 750 MG/1
10 CAPSULE, EXTENDED RELEASE ORAL 2 TIMES DAILY
Qty: 180 CAPSULE | Refills: 2 | Status: SHIPPED | OUTPATIENT
Start: 2025-06-15

## 2025-06-16 NOTE — TELEPHONE ENCOUNTER
Care Due:                  Date            Visit Type   Department     Provider  --------------------------------------------------------------------------------                                EP - [facility]      [address]  Last Visit: 02-      CARE ([facility])   FAMILY Ty  Jessica  Next Visit: None Scheduled  None         None Found                                                            Last  Test          Frequency    Reason                     Performed    Due Date  --------------------------------------------------------------------------------    Mg Level....  12 months..  magnesium................  Not Found    Overdue    [employer] Embedded Care Due Messages. Reference number: [id number].   6/15/2025 11:33:29 PM CDT

## 2025-06-16 NOTE — TELEPHONE ENCOUNTER
Refill Decision Note   Howard Segura  is requesting a refill authorization.  Brief Assessment and Rationale for Refill:  Approve     Medication Therapy Plan:         Comments:     Note composed:11:34 PM 06/15/2025

## 2025-07-24 RX ORDER — L-MEFOL/A-CYST/MEB12/ALGAL OIL 6-600-2 MG
1 TABLET ORAL DAILY
Qty: 90 TABLET | Refills: 3 | Status: SHIPPED | OUTPATIENT
Start: 2025-07-24

## 2025-07-30 ENCOUNTER — PATIENT MESSAGE (OUTPATIENT)
Dept: FAMILY MEDICINE | Facility: CLINIC | Age: 65
End: 2025-07-30
Payer: COMMERCIAL

## 2025-07-30 DIAGNOSIS — K45.8 HERNIA OF FLANK: Primary | ICD-10-CM

## 2025-07-30 NOTE — TELEPHONE ENCOUNTER
Referral sent to Dr. Cal brasher.  I am not sure about the location and position of hernia right now so I have put a general referral.      Reason for Exam  Priority: Routine  Dx: Hernia of flank [K45.8 (ICD-10-CM)]   Comments: Kindly evaluate for hernia.      Dr. Jessica BARBER     Referral Details    Referred By   Referred To   Bassam Lopez MD  [address]  [address]  [place] [address]  Phone: [phone]  Fax: [phone] Diagnoses: Hernia of flank  Order: Ambulatory referral/consult to General Surgery  Reason: Specialty Services Required Cal Brasher Jr., MD  [address]  [address]  [place] [address]  Phone: [phone]  Fax: [phone]          Future Order Information    ===View-only below this line===      ----- Message -----       From:Howard Segura       Sent:7/30/2025  9:24 PM CDT         To:Patient Medical Advice Request Message List    Subject:Referral    [facility] is fine. You had initially referred me to a hernia specialty group and they called me but i lost that referral. I was gonna go to doctor angela but he is retiring and i would like to take advantage of new procedures.     Thanks Howard      ----- Message -----       From:Bassam Lopez MD       Sent:7/30/2025  9:20 PM CDT         To:Howard Segura    Subject:Referral    We have good set of doctors taking care of hernia in the [facility] system.  Or would you like to see the [facility] surgeons system.      ----- Message -----       From:Howard Segura       Sent:7/30/2025  2:49 PM CDT         To:Patient Medical Advice Request Message List    Subject:Referral    Natalie there was another group of doctors that yall sent a referral to cause they called me but i cant find them. I think dr angela is retired or retiring. This other group specialized in hernias    Thanks Howard      ----- Message -----       From:Howard Segura       Sent:7/30/2025  2:42 PM CDT         To:Patient Medical Advice Request Message List     Subject:Referral    Thanks daisy      ----- Message -----       From:Med Assistant Estrada       Sent:7/30/2025  2:02 PM CDT         To:Priscila Segura    Subject:Referral    Referred To   [facility]    [facility]   [phone]      ----- Message -----       From:Priscila Segura       Sent:7/30/2025  1:54 PM CDT         To:Bassam Lopez MD    Subject:Referral    Dr lopez you gave me a referrel to get my hernia repaired but i cant find it. Can you send me the doctor or clinics name    Thanks priscila

## 2025-08-12 ENCOUNTER — OFFICE VISIT (OUTPATIENT)
Dept: PODIATRY | Facility: CLINIC | Age: 65
End: 2025-08-12
Payer: COMMERCIAL

## 2025-08-12 ENCOUNTER — HOSPITAL ENCOUNTER (OUTPATIENT)
Dept: RADIOLOGY | Facility: HOSPITAL | Age: 65
Discharge: HOME | End: 2025-08-12
Attending: PODIATRIST
Payer: COMMERCIAL

## 2025-08-12 DIAGNOSIS — M79.673 ACUTE FOOT PAIN, UNSPECIFIED LATERALITY: Primary | ICD-10-CM

## 2025-08-12 DIAGNOSIS — M10.071 ACUTE IDIOPATHIC GOUT OF RIGHT FOOT: ICD-10-CM

## 2025-08-12 DIAGNOSIS — M79.673 ACUTE FOOT PAIN, UNSPECIFIED LATERALITY: ICD-10-CM

## 2025-08-12 PROCEDURE — 99999 PR PBB SHADOW E&M-EST. PATIENT-LVL II: CPT | Mod: PBBFAC,,, | Performed by: PODIATRIST

## 2025-08-12 PROCEDURE — 73630 X-RAY EXAM OF FOOT: CPT | Mod: 26,RT,, | Performed by: STUDENT IN AN ORGANIZED HEALTH CARE EDUCATION/TRAINING PROGRAM

## 2025-08-12 PROCEDURE — 73630 X-RAY EXAM OF FOOT: CPT | Mod: TC,PO,RT

## 2025-08-12 RX ORDER — DUPILUMAB 300 MG/2ML
300 INJECTION, SOLUTION SUBCUTANEOUS
COMMUNITY
Start: 2025-05-19

## 2025-08-13 ENCOUNTER — TELEPHONE (OUTPATIENT)
Dept: PODIATRY | Facility: CLINIC | Age: 65
End: 2025-08-13
Payer: COMMERCIAL

## 2025-08-13 ENCOUNTER — OFFICE VISIT (OUTPATIENT)
Dept: SURGERY | Facility: CLINIC | Age: 65
End: 2025-08-13
Payer: COMMERCIAL

## 2025-08-13 ENCOUNTER — PATIENT MESSAGE (OUTPATIENT)
Dept: PODIATRY | Facility: CLINIC | Age: 65
End: 2025-08-13
Payer: COMMERCIAL

## 2025-08-13 VITALS
HEART RATE: 60 BPM | TEMPERATURE: 98 F | DIASTOLIC BLOOD PRESSURE: 102 MMHG | WEIGHT: 229.06 LBS | BODY MASS INDEX: 31.03 KG/M2 | SYSTOLIC BLOOD PRESSURE: 139 MMHG | HEIGHT: 72 IN

## 2025-08-13 DIAGNOSIS — K45.8 HERNIA OF FLANK: ICD-10-CM

## 2025-08-13 DIAGNOSIS — K40.90 RIGHT INGUINAL HERNIA: Primary | ICD-10-CM

## 2025-08-13 PROCEDURE — 3075F SYST BP GE 130 - 139MM HG: CPT | Mod: CPTII,S$GLB,, | Performed by: SURGERY

## 2025-08-13 PROCEDURE — 1159F MED LIST DOCD IN RCRD: CPT | Mod: CPTII,S$GLB,, | Performed by: SURGERY

## 2025-08-13 PROCEDURE — 3008F BODY MASS INDEX DOCD: CPT | Mod: CPTII,S$GLB,, | Performed by: SURGERY

## 2025-08-13 PROCEDURE — 3080F DIAST BP >= 90 MM HG: CPT | Mod: CPTII,S$GLB,, | Performed by: SURGERY

## 2025-08-13 PROCEDURE — 99999 PR PBB SHADOW E&M-EST. PATIENT-LVL V: CPT | Mod: PBBFAC,,, | Performed by: SURGERY

## 2025-08-13 PROCEDURE — 99204 OFFICE O/P NEW MOD 45 MIN: CPT | Mod: S$GLB,,, | Performed by: SURGERY

## 2025-08-13 RX ORDER — COLCHICINE 0.6 MG/1
TABLET ORAL
Qty: 30 TABLET | Refills: 11 | Status: SHIPPED | OUTPATIENT
Start: 2025-08-13

## 2025-08-13 RX ORDER — CEFAZOLIN SODIUM 2 G/50ML
2 SOLUTION INTRAVENOUS
OUTPATIENT
Start: 2025-08-13

## 2025-08-14 ENCOUNTER — HOSPITAL ENCOUNTER (OUTPATIENT)
Dept: PREADMISSION TESTING | Facility: HOSPITAL | Age: 65
Discharge: HOME | End: 2025-08-14
Attending: SURGERY
Payer: COMMERCIAL

## 2025-08-14 VITALS
BODY MASS INDEX: 27.06 KG/M2 | HEART RATE: 64 BPM | SYSTOLIC BLOOD PRESSURE: 130 MMHG | OXYGEN SATURATION: 99 % | DIASTOLIC BLOOD PRESSURE: 89 MMHG | WEIGHT: 199.81 LBS | HEIGHT: 72 IN | RESPIRATION RATE: 16 BRPM | TEMPERATURE: 98 F

## 2025-08-14 DIAGNOSIS — K40.90 RIGHT INGUINAL HERNIA: ICD-10-CM

## 2025-08-14 DIAGNOSIS — Z01.818 PREOP TESTING: Primary | ICD-10-CM

## 2025-08-14 LAB
ABSOLUTE EOSINOPHIL (SMH): 0.2 K/UL
ABSOLUTE MONOCYTE (SMH): 0.68 K/UL (ref 0.3–1)
ABSOLUTE NEUTROPHIL COUNT (SMH): 3.8 K/UL (ref 1.8–7.7)
ALBUMIN SERPL-MCNC: 4.4 G/DL (ref 3.5–5.2)
ALP SERPL-CCNC: 102 UNIT/L (ref 55–135)
ALT SERPL-CCNC: 11 UNIT/L (ref 10–44)
ANION GAP (SMH): 8 MMOL/L (ref 8–16)
AST SERPL-CCNC: 12 UNIT/L (ref 10–40)
BASOPHILS # BLD AUTO: 0.06 K/UL
BASOPHILS NFR BLD AUTO: 1 %
BILIRUB SERPL-MCNC: 0.8 MG/DL (ref 0.1–1)
BUN SERPL-MCNC: 16 MG/DL (ref 8–23)
CALCIUM SERPL-MCNC: 9.5 MG/DL (ref 8.7–10.5)
CHLORIDE SERPL-SCNC: 100 MMOL/L (ref 95–110)
CO2 SERPL-SCNC: 28 MMOL/L (ref 23–29)
CREAT SERPL-MCNC: 1.1 MG/DL (ref 0.5–1.4)
ERYTHROCYTE [DISTWIDTH] IN BLOOD BY AUTOMATED COUNT: 12.9 % (ref 11.5–14.5)
GFR SERPLBLD CREATININE-BSD FMLA CKD-EPI: >60 ML/MIN/1.73/M2
GLUCOSE SERPL-MCNC: 115 MG/DL (ref 70–110)
HCT VFR BLD AUTO: 40.4 % (ref 40–54)
HGB BLD-MCNC: 14.1 GM/DL (ref 14–18)
IMM GRANULOCYTES # BLD AUTO: 0.04 K/UL (ref 0–0.04)
IMM GRANULOCYTES NFR BLD AUTO: 0.6 % (ref 0–0.5)
LYMPHOCYTES # BLD AUTO: 1.46 K/UL (ref 1–4.8)
MCH RBC QN AUTO: 29.4 PG (ref 27–31)
MCHC RBC AUTO-ENTMCNC: 34.9 G/DL (ref 32–36)
MCV RBC AUTO: 84 FL (ref 82–98)
NUCLEATED RBC (/100WBC) (SMH): 0 /100 WBC
PLATELET # BLD AUTO: 274 K/UL (ref 150–450)
PMV BLD AUTO: 8.8 FL (ref 9.2–12.9)
POTASSIUM SERPL-SCNC: 3.8 MMOL/L (ref 3.5–5.1)
PROT SERPL-MCNC: 7.3 GM/DL (ref 6–8.4)
RBC # BLD AUTO: 4.79 M/UL (ref 4.6–6.2)
RELATIVE EOSINOPHIL (SMH): 3.2 % (ref 0–8)
RELATIVE LYMPHOCYTE (SMH): 23.4 % (ref 18–48)
RELATIVE MONOCYTE (SMH): 10.9 % (ref 4–15)
RELATIVE NEUTROPHIL (SMH): 60.9 % (ref 38–73)
SODIUM SERPL-SCNC: 136 MMOL/L (ref 136–145)
WBC # BLD AUTO: 6.24 K/UL (ref 3.9–12.7)

## 2025-08-14 PROCEDURE — 84460 ALANINE AMINO (ALT) (SGPT): CPT | Performed by: SURGERY

## 2025-08-14 PROCEDURE — 85025 COMPLETE CBC W/AUTO DIFF WBC: CPT

## 2025-08-14 PROCEDURE — 36415 COLL VENOUS BLD VENIPUNCTURE: CPT | Performed by: SURGERY

## 2025-08-15 ENCOUNTER — TELEPHONE (OUTPATIENT)
Dept: SURGERY | Facility: CLINIC | Age: 65
End: 2025-08-15
Payer: COMMERCIAL

## 2025-08-21 ENCOUNTER — ANESTHESIA (OUTPATIENT)
Dept: SURGERY | Facility: HOSPITAL | Age: 65
End: 2025-08-21
Payer: COMMERCIAL

## 2025-08-21 ENCOUNTER — ANESTHESIA EVENT (OUTPATIENT)
Dept: SURGERY | Facility: HOSPITAL | Age: 65
End: 2025-08-21
Payer: COMMERCIAL

## 2025-08-21 ENCOUNTER — HOSPITAL ENCOUNTER (OUTPATIENT)
Facility: HOSPITAL | Age: 65
Discharge: HOME | End: 2025-08-21
Attending: SURGERY | Admitting: SURGERY
Payer: COMMERCIAL

## 2025-08-21 VITALS
SYSTOLIC BLOOD PRESSURE: 132 MMHG | HEIGHT: 72 IN | BODY MASS INDEX: 26.14 KG/M2 | RESPIRATION RATE: 16 BRPM | TEMPERATURE: 98 F | DIASTOLIC BLOOD PRESSURE: 74 MMHG | OXYGEN SATURATION: 98 % | HEART RATE: 64 BPM | WEIGHT: 193 LBS

## 2025-08-21 DIAGNOSIS — K40.90 RIGHT INGUINAL HERNIA: ICD-10-CM

## 2025-08-21 PROCEDURE — 37000009 HC ANESTHESIA EA ADD 15 MINS: Performed by: SURGERY

## 2025-08-21 PROCEDURE — 71000015 HC POSTOP RECOV 1ST HR: Performed by: SURGERY

## 2025-08-21 PROCEDURE — 63600175 PHARM REV CODE 636 W HCPCS: Performed by: SURGERY

## 2025-08-21 PROCEDURE — 49650 LAP ING HERNIA REPAIR INIT: CPT | Mod: RT,,, | Performed by: SURGERY

## 2025-08-21 PROCEDURE — 37000008 HC ANESTHESIA 1ST 15 MINUTES: Performed by: SURGERY

## 2025-08-21 PROCEDURE — 36000711: Performed by: SURGERY

## 2025-08-21 PROCEDURE — C1781 MESH (IMPLANTABLE): HCPCS | Performed by: SURGERY

## 2025-08-21 PROCEDURE — 25000003 PHARM REV CODE 250

## 2025-08-21 PROCEDURE — 71000033 HC RECOVERY, INTIAL HOUR: Performed by: SURGERY

## 2025-08-21 PROCEDURE — 71000016 HC POSTOP RECOV ADDL HR: Performed by: SURGERY

## 2025-08-21 PROCEDURE — 63600175 PHARM REV CODE 636 W HCPCS

## 2025-08-21 PROCEDURE — 36000710: Performed by: SURGERY

## 2025-08-21 PROCEDURE — 27201423 OPTIME MED/SURG SUP & DEVICES STERILE SUPPLY: Performed by: SURGERY

## 2025-08-21 DEVICE — MESH PROGRIP LAP 12X16CM RIGHT: Type: IMPLANTABLE DEVICE | Site: ABDOMEN | Status: FUNCTIONAL

## 2025-08-21 RX ORDER — LIDOCAINE HYDROCHLORIDE 20 MG/ML
INJECTION, SOLUTION EPIDURAL; INFILTRATION; INTRACAUDAL; PERINEURAL
Status: DISCONTINUED | OUTPATIENT
Start: 2025-08-21 | End: 2025-08-21

## 2025-08-21 RX ORDER — ACETAMINOPHEN 10 MG/ML
INJECTION, SOLUTION INTRAVENOUS
Status: DISCONTINUED | OUTPATIENT
Start: 2025-08-21 | End: 2025-08-21

## 2025-08-21 RX ORDER — CEFAZOLIN 2 G/1
2 INJECTION, POWDER, FOR SOLUTION INTRAMUSCULAR; INTRAVENOUS
Status: COMPLETED | OUTPATIENT
Start: 2025-08-21 | End: 2025-08-21

## 2025-08-21 RX ORDER — PHENYLEPHRINE HYDROCHLORIDE 10 MG/ML
INJECTION INTRAVENOUS
Status: DISCONTINUED | OUTPATIENT
Start: 2025-08-21 | End: 2025-08-21

## 2025-08-21 RX ORDER — ROCURONIUM BROMIDE 10 MG/ML
INJECTION, SOLUTION INTRAVENOUS
Status: DISCONTINUED | OUTPATIENT
Start: 2025-08-21 | End: 2025-08-21

## 2025-08-21 RX ORDER — OXYCODONE HYDROCHLORIDE 5 MG/1
5 TABLET ORAL
Status: DISCONTINUED | OUTPATIENT
Start: 2025-08-21 | End: 2025-08-21 | Stop reason: HOSPADM

## 2025-08-21 RX ORDER — FAMOTIDINE 10 MG/ML
INJECTION, SOLUTION INTRAVENOUS
Status: DISCONTINUED | OUTPATIENT
Start: 2025-08-21 | End: 2025-08-21

## 2025-08-21 RX ORDER — HYDROMORPHONE HYDROCHLORIDE 1 MG/ML
0.2 INJECTION, SOLUTION INTRAMUSCULAR; INTRAVENOUS; SUBCUTANEOUS EVERY 5 MIN PRN
Status: DISCONTINUED | OUTPATIENT
Start: 2025-08-21 | End: 2025-08-21 | Stop reason: HOSPADM

## 2025-08-21 RX ORDER — BUPIVACAINE 13.3 MG/ML
INJECTION, SUSPENSION, LIPOSOMAL INFILTRATION
Status: DISCONTINUED | OUTPATIENT
Start: 2025-08-21 | End: 2025-08-21 | Stop reason: HOSPADM

## 2025-08-21 RX ORDER — MIDAZOLAM HYDROCHLORIDE 1 MG/ML
INJECTION INTRAMUSCULAR; INTRAVENOUS
Status: DISCONTINUED | OUTPATIENT
Start: 2025-08-21 | End: 2025-08-21

## 2025-08-21 RX ORDER — HYDROCODONE BITARTRATE AND ACETAMINOPHEN 5; 325 MG/1; MG/1
1 TABLET ORAL EVERY 6 HOURS PRN
Qty: 28 TABLET | Refills: 0 | Status: SHIPPED | OUTPATIENT
Start: 2025-08-21

## 2025-08-21 RX ORDER — PROPOFOL 10 MG/ML
VIAL (ML) INTRAVENOUS
Status: DISCONTINUED | OUTPATIENT
Start: 2025-08-21 | End: 2025-08-21

## 2025-08-21 RX ORDER — BUPIVACAINE HYDROCHLORIDE 2.5 MG/ML
INJECTION, SOLUTION EPIDURAL; INFILTRATION; INTRACAUDAL; PERINEURAL
Status: DISCONTINUED | OUTPATIENT
Start: 2025-08-21 | End: 2025-08-21 | Stop reason: HOSPADM

## 2025-08-21 RX ORDER — DEXAMETHASONE SODIUM PHOSPHATE 4 MG/ML
INJECTION, SOLUTION INTRA-ARTICULAR; INTRALESIONAL; INTRAMUSCULAR; INTRAVENOUS; SOFT TISSUE
Status: DISCONTINUED | OUTPATIENT
Start: 2025-08-21 | End: 2025-08-21

## 2025-08-21 RX ORDER — KETAMINE HCL IN 0.9 % NACL 50 MG/5 ML
SYRINGE (ML) INTRAVENOUS
Status: DISCONTINUED | OUTPATIENT
Start: 2025-08-21 | End: 2025-08-21

## 2025-08-21 RX ORDER — GLUCAGON 1 MG
1 KIT INJECTION
Status: DISCONTINUED | OUTPATIENT
Start: 2025-08-21 | End: 2025-08-21 | Stop reason: HOSPADM

## 2025-08-21 RX ORDER — DEXMEDETOMIDINE HYDROCHLORIDE 100 UG/ML
INJECTION, SOLUTION INTRAVENOUS
Status: DISCONTINUED | OUTPATIENT
Start: 2025-08-21 | End: 2025-08-21

## 2025-08-21 RX ORDER — SUCCINYLCHOLINE CHLORIDE 20 MG/ML
INJECTION INTRAMUSCULAR; INTRAVENOUS
Status: DISCONTINUED | OUTPATIENT
Start: 2025-08-21 | End: 2025-08-21

## 2025-08-21 RX ORDER — ONDANSETRON HYDROCHLORIDE 2 MG/ML
INJECTION, SOLUTION INTRAMUSCULAR; INTRAVENOUS
Status: DISCONTINUED | OUTPATIENT
Start: 2025-08-21 | End: 2025-08-21

## 2025-08-21 RX ORDER — FENTANYL CITRATE 50 UG/ML
INJECTION, SOLUTION INTRAMUSCULAR; INTRAVENOUS
Status: DISCONTINUED | OUTPATIENT
Start: 2025-08-21 | End: 2025-08-21

## 2025-08-21 RX ORDER — DIPHENHYDRAMINE HYDROCHLORIDE 50 MG/ML
12.5 INJECTION, SOLUTION INTRAMUSCULAR; INTRAVENOUS
Status: DISCONTINUED | OUTPATIENT
Start: 2025-08-21 | End: 2025-08-21 | Stop reason: HOSPADM

## 2025-08-21 RX ORDER — ONDANSETRON HYDROCHLORIDE 2 MG/ML
4 INJECTION, SOLUTION INTRAVENOUS DAILY PRN
Status: DISCONTINUED | OUTPATIENT
Start: 2025-08-21 | End: 2025-08-21 | Stop reason: HOSPADM

## 2025-08-21 RX ADMIN — ROCURONIUM BROMIDE 45 MG: 10 INJECTION, SOLUTION INTRAVENOUS at 07:08

## 2025-08-21 RX ADMIN — PHENYLEPHRINE HYDROCHLORIDE 100 MCG: 10 INJECTION INTRAVENOUS at 07:08

## 2025-08-21 RX ADMIN — FENTANYL CITRATE 50 MCG: 0.05 INJECTION, SOLUTION INTRAMUSCULAR; INTRAVENOUS at 08:08

## 2025-08-21 RX ADMIN — PHENYLEPHRINE HYDROCHLORIDE 150 MCG: 10 INJECTION INTRAVENOUS at 08:08

## 2025-08-21 RX ADMIN — LIDOCAINE HYDROCHLORIDE 80 MG: 20 INJECTION, SOLUTION INTRAVENOUS at 07:08

## 2025-08-21 RX ADMIN — Medication 120 MG: at 07:08

## 2025-08-21 RX ADMIN — ROCURONIUM BROMIDE 20 MG: 10 INJECTION, SOLUTION INTRAVENOUS at 08:08

## 2025-08-21 RX ADMIN — ROCURONIUM BROMIDE 5 MG: 10 INJECTION, SOLUTION INTRAVENOUS at 07:08

## 2025-08-21 RX ADMIN — DEXMEDETOMIDINE HYDROCHLORIDE 8 MCG: 100 INJECTION, SOLUTION INTRAVENOUS at 08:08

## 2025-08-21 RX ADMIN — ONDANSETRON 4 MG: 2 INJECTION INTRAMUSCULAR; INTRAVENOUS at 07:08

## 2025-08-21 RX ADMIN — DEXAMETHASONE SODIUM PHOSPHATE 8 MG: 4 INJECTION, SOLUTION INTRAMUSCULAR; INTRAVENOUS at 07:08

## 2025-08-21 RX ADMIN — SUGAMMADEX 200 MG: 100 INJECTION, SOLUTION INTRAVENOUS at 09:08

## 2025-08-21 RX ADMIN — SODIUM CHLORIDE, SODIUM LACTATE, POTASSIUM CHLORIDE, AND CALCIUM CHLORIDE: .6; .31; .03; .02 INJECTION, SOLUTION INTRAVENOUS at 08:08

## 2025-08-21 RX ADMIN — PHENYLEPHRINE HYDROCHLORIDE 100 MCG: 10 INJECTION INTRAVENOUS at 08:08

## 2025-08-21 RX ADMIN — ACETAMINOPHEN 1000 MG: 10 INJECTION, SOLUTION INTRAVENOUS at 07:08

## 2025-08-21 RX ADMIN — SODIUM CHLORIDE, SODIUM LACTATE, POTASSIUM CHLORIDE, AND CALCIUM CHLORIDE: .6; .31; .03; .02 INJECTION, SOLUTION INTRAVENOUS at 07:08

## 2025-08-21 RX ADMIN — MIDAZOLAM HYDROCHLORIDE 2 MG: 1 INJECTION, SOLUTION INTRAMUSCULAR; INTRAVENOUS at 07:08

## 2025-08-21 RX ADMIN — CEFAZOLIN 2 G: 2 INJECTION, POWDER, FOR SOLUTION INTRAMUSCULAR; INTRAVENOUS at 07:08

## 2025-08-21 RX ADMIN — PHENYLEPHRINE HYDROCHLORIDE 150 MCG: 10 INJECTION INTRAVENOUS at 07:08

## 2025-08-21 RX ADMIN — GLYCOPYRROLATE 0.2 MG: 0.2 INJECTION, SOLUTION INTRAMUSCULAR; INTRAVITREAL at 07:08

## 2025-08-21 RX ADMIN — Medication 25 MG: at 08:08

## 2025-08-21 RX ADMIN — PROPOFOL 150 MG: 10 INJECTION, EMULSION INTRAVENOUS at 07:08

## 2025-08-21 RX ADMIN — FAMOTIDINE 20 MG: 10 INJECTION, SOLUTION INTRAVENOUS at 07:08

## 2025-08-21 RX ADMIN — FENTANYL CITRATE 100 MCG: 0.05 INJECTION, SOLUTION INTRAMUSCULAR; INTRAVENOUS at 07:08

## 2025-09-03 ENCOUNTER — OFFICE VISIT (OUTPATIENT)
Dept: PODIATRY | Facility: CLINIC | Age: 65
End: 2025-09-03
Payer: COMMERCIAL

## 2025-09-03 VITALS — HEIGHT: 72 IN | WEIGHT: 196.19 LBS | OXYGEN SATURATION: 98 % | HEART RATE: 66 BPM | BODY MASS INDEX: 26.57 KG/M2

## 2025-09-03 DIAGNOSIS — M10.071 ACUTE IDIOPATHIC GOUT OF RIGHT FOOT: Primary | ICD-10-CM

## 2025-09-03 DIAGNOSIS — M79.672 PAIN IN BOTH FEET: ICD-10-CM

## 2025-09-03 DIAGNOSIS — M10.072 ACUTE IDIOPATHIC GOUT OF LEFT FOOT: ICD-10-CM

## 2025-09-03 DIAGNOSIS — M79.671 PAIN IN BOTH FEET: ICD-10-CM

## 2025-09-03 PROCEDURE — 1159F MED LIST DOCD IN RCRD: CPT | Mod: CPTII,S$GLB,, | Performed by: PODIATRIST

## 2025-09-03 PROCEDURE — 3008F BODY MASS INDEX DOCD: CPT | Mod: CPTII,S$GLB,, | Performed by: PODIATRIST

## 2025-09-03 PROCEDURE — 99214 OFFICE O/P EST MOD 30 MIN: CPT | Mod: S$GLB,,, | Performed by: PODIATRIST

## 2025-09-03 PROCEDURE — 99999 PR PBB SHADOW E&M-EST. PATIENT-LVL IV: CPT | Mod: PBBFAC,,, | Performed by: PODIATRIST

## 2025-09-03 PROCEDURE — 1160F RVW MEDS BY RX/DR IN RCRD: CPT | Mod: CPTII,S$GLB,, | Performed by: PODIATRIST

## 2025-09-03 RX ORDER — ALLOPURINOL 100 MG/1
100 TABLET ORAL 2 TIMES DAILY
Qty: 180 TABLET | Refills: 1 | Status: SHIPPED | OUTPATIENT
Start: 2025-09-03

## 2025-09-03 RX ORDER — METHYLPREDNISOLONE 4 MG/1
TABLET ORAL
Qty: 21 EACH | Refills: 1 | Status: SHIPPED | OUTPATIENT
Start: 2025-09-03 | End: 2025-09-24

## (undated) DEVICE — SUT V-LOC 180 2-0 GS-22 9IN

## (undated) DEVICE — SUT 3/0 18IN COATED VICRYLP

## (undated) DEVICE — COVER LIGHT HANDLE 80/CA

## (undated) DEVICE — NDL SAFETY 21G X 1 1/2 ECLPSE

## (undated) DEVICE — GLOVE SURG ULTRA TOUCH 7.5

## (undated) DEVICE — DRAPE STERI-DRAPE 83X125 IOBAN

## (undated) DEVICE — SUT MONOCRYL 3-0 PS-1

## (undated) DEVICE — SUT MONOCRYL 4-0 PS-2

## (undated) DEVICE — SEE MEDLINE ITEM 152530

## (undated) DEVICE — SUT VICRYL 1 MO-4 18 CR/8

## (undated) DEVICE — SYR 10CC LUER LOCK

## (undated) DEVICE — BLADE SURG CARBON STEEL SZ11

## (undated) DEVICE — PADDING CAST SPECIALIST 6X4YD

## (undated) DEVICE — CLIPPER BLADE MOD 4406 (CAREF)

## (undated) DEVICE — CATH IV INTROCAN 14G X 2.

## (undated) DEVICE — TOWEL OR DISP STRL BLUE 4/PK

## (undated) DEVICE — SPONGE SUPER KERLIX 6X6.75IN

## (undated) DEVICE — SEE MEDLINE ITEM 146271

## (undated) DEVICE — BIT DRILL 3.2

## (undated) DEVICE — SCRUB HIBICLENS 4% CHG 4OZ

## (undated) DEVICE — PAD ABD 8X10 STERILE

## (undated) DEVICE — GOWN X-LG STERILE BACK

## (undated) DEVICE — STRAP OR TABLE 5IN X 72IN

## (undated) DEVICE — DRESSING N ADH OIL EMUL 3X8 3S

## (undated) DEVICE — APPLICATOR CHLORAPREP ORN 26ML

## (undated) DEVICE — ELECTRODE MEGADYNE RETURN DUAL

## (undated) DEVICE — GOWN POLY REINF BRTH SLV XL

## (undated) DEVICE — DRAPE C ARM 42 X 120 10/BX

## (undated) DEVICE — GLOVE SURG ULTRA TOUCH 8

## (undated) DEVICE — NDL PNEUMO INSUFFLATI 120MM

## (undated) DEVICE — SOL CLEARIFY VISUALIZATION LAP

## (undated) DEVICE — COVER TIP CURVED SCISSORS XI

## (undated) DEVICE — DRAPE LAPSCP CHOLE 122X102X78

## (undated) DEVICE — SUT VCRL 2-0 GYN 8-18IN MO4

## (undated) DEVICE — EVACUATOR WOUND BULB 100CC

## (undated) DEVICE — CLOSURE SKIN STERI STRIP 1/2X4

## (undated) DEVICE — ELECTRODE REM PLYHSV RETURN 9

## (undated) DEVICE — DRAPE COLUMN DAVINCI XI

## (undated) DEVICE — BIT DRILL 4.5MM DIA 145/120MM

## (undated) DEVICE — DRAPE INCISE IOBAN 2 23X17IN

## (undated) DEVICE — NDL INSUF ULTRA VERESS 120MM

## (undated) DEVICE — Device

## (undated) DEVICE — SUT CTD VICRYL 1 UND BR

## (undated) DEVICE — ALCOHOL 70% ISOP RUBBING 4OZ

## (undated) DEVICE — OBTURATOR BLADELESS 8MM XI CLR

## (undated) DEVICE — SOL ELECTROLUBE ANTI-STIC

## (undated) DEVICE — SOL 9P NACL IRR PIC IL

## (undated) DEVICE — GLOVE SENSICARE PI ALOE 7

## (undated) DEVICE — SPONGE DERMACEA GAUZE 4X4

## (undated) DEVICE — SET TUBE PNEUMOCLEAR SE HI FLO

## (undated) DEVICE — PACK BASIC

## (undated) DEVICE — SOL NACL IRR 1000ML BTL

## (undated) DEVICE — COVER MAYO STAND XL 57X32IN

## (undated) DEVICE — GUIDE PIN 2.5MM DIA.
Type: IMPLANTABLE DEVICE | Site: HIP | Status: NON-FUNCTIONAL
Removed: 2019-12-22

## (undated) DEVICE — ADHESIVE DERMABOND ADVANCED

## (undated) DEVICE — DRAPE ARM DAVINCI XI

## (undated) DEVICE — UNDERGLOVES BIOGEL PI SIZE 8

## (undated) DEVICE — TAPE SILK 3IN

## (undated) DEVICE — PACK CUSTOM UNIV BASIN SLI

## (undated) DEVICE — TRAY CATH 1-LYR URIMTR 16FR

## (undated) DEVICE — COVER CAMERA OPERATING ROOM

## (undated) DEVICE — DRAIN SINGLE ROUND 3/16 15F

## (undated) DEVICE — STAPLER SKIN ROTATING HEAD